# Patient Record
Sex: MALE | Race: WHITE | HISPANIC OR LATINO
[De-identification: names, ages, dates, MRNs, and addresses within clinical notes are randomized per-mention and may not be internally consistent; named-entity substitution may affect disease eponyms.]

---

## 2019-08-28 PROBLEM — Z00.00 ENCOUNTER FOR PREVENTIVE HEALTH EXAMINATION: Status: ACTIVE | Noted: 2019-08-28

## 2019-09-10 ENCOUNTER — APPOINTMENT (OUTPATIENT)
Dept: CARDIOLOGY | Facility: CLINIC | Age: 84
End: 2019-09-10
Payer: MEDICARE

## 2019-09-10 ENCOUNTER — NON-APPOINTMENT (OUTPATIENT)
Age: 84
End: 2019-09-10

## 2019-09-10 VITALS
OXYGEN SATURATION: 100 % | HEIGHT: 66 IN | HEART RATE: 75 BPM | WEIGHT: 154 LBS | DIASTOLIC BLOOD PRESSURE: 64 MMHG | SYSTOLIC BLOOD PRESSURE: 154 MMHG | BODY MASS INDEX: 24.75 KG/M2

## 2019-09-10 DIAGNOSIS — E11.9 TYPE 2 DIABETES MELLITUS W/OUT COMPLICATIONS: ICD-10-CM

## 2019-09-10 PROCEDURE — 93000 ELECTROCARDIOGRAM COMPLETE: CPT

## 2019-09-10 PROCEDURE — 99205 OFFICE O/P NEW HI 60 MIN: CPT

## 2019-09-10 NOTE — PHYSICAL EXAM
[General Appearance - Well Developed] : well developed [Normal Appearance] : normal appearance [Well Groomed] : well groomed [General Appearance - Well Nourished] : well nourished [No Deformities] : no deformities [General Appearance - In No Acute Distress] : no acute distress [Normal Conjunctiva] : the conjunctiva exhibited no abnormalities [Normal Oral Mucosa] : normal oral mucosa [Normal Oropharynx] : normal oropharynx [Normal] : normal [Normal Rate] : normal [Irregularly Irregular] : irregularly irregular [Normal S1] : normal S1 [Normal S2] : normal S2 [II] : a grade 2 [___ +] : bilateral [unfilled]U+ pretibial pitting edema [Respiration, Rhythm And Depth] : normal respiratory rhythm and effort [Exaggerated Use Of Accessory Muscles For Inspiration] : no accessory muscle use [Auscultation Breath Sounds / Voice Sounds] : lungs were clear to auscultation bilaterally [Abdomen Soft] : soft [Abdomen Tenderness] : non-tender [Abnormal Walk] : normal gait [Cyanosis, Localized] : no localized cyanosis [Nail Clubbing] : no clubbing of the fingernails [Affect] : the affect was normal [] : no rash [Skin Color & Pigmentation] : normal skin color and pigmentation [Mood] : the mood was normal

## 2019-09-11 ENCOUNTER — MEDICATION RENEWAL (OUTPATIENT)
Age: 84
End: 2019-09-11

## 2019-09-13 RX ORDER — CLOPIDOGREL BISULFATE 75 MG/1
75 TABLET, FILM COATED ORAL DAILY
Qty: 1 | Refills: 2 | Status: DISCONTINUED | COMMUNITY
Start: 1900-01-01 | End: 2019-09-13

## 2019-10-01 ENCOUNTER — APPOINTMENT (OUTPATIENT)
Dept: CARDIOLOGY | Facility: CLINIC | Age: 84
End: 2019-10-01

## 2019-10-14 ENCOUNTER — APPOINTMENT (OUTPATIENT)
Dept: CV DIAGNOSITCS | Facility: HOSPITAL | Age: 84
End: 2019-10-14

## 2019-10-14 ENCOUNTER — OUTPATIENT (OUTPATIENT)
Dept: OUTPATIENT SERVICES | Facility: HOSPITAL | Age: 84
LOS: 1 days | End: 2019-10-14
Payer: MEDICARE

## 2019-10-14 DIAGNOSIS — I48.91 UNSPECIFIED ATRIAL FIBRILLATION: ICD-10-CM

## 2019-10-14 PROCEDURE — 93306 TTE W/DOPPLER COMPLETE: CPT

## 2019-10-14 PROCEDURE — 93306 TTE W/DOPPLER COMPLETE: CPT | Mod: 26

## 2019-10-14 NOTE — DISCUSSION/SUMMARY
[FreeTextEntry1] : Patient is a 88yo M with a PMHx of HTN, HLD, and DM who comes to clinic for evaluation of newly found atrial fibrillation. \par ---------------\par TTE -  - Normal LV; Mild AS; Mild to Mod MR; Mod PI; Mod TR; RVE with normal function; E/e\par \par ---------------\par \par # AFib. He has been on afib for at least 1 month. \par - Given risk of CVA would recommend AC. Risks and benefit discussed. \par - Echocardiogram ordered to assess for any valvular/structural abnormalities. \par -\par \par #LE Edema\par - Monitor\par - Given c/f fall, will hold off on treating with diuretics. \par \par \par \par \par \par \par \par \par \par  \par \par \par \par \par \par \par \par \par \par \par \par \par Hypertension (Stable; Chronic). Hyperlipidemia (Stable; Chronic). Coronary Artery Disease (Stable; Chronic). Medication plan for these conditions noted above. \par Total Time Spent in face-to-face encounter was 60 minutes. >50% time spent in counseling and coordination of care and on addressing above medical conditions in assessment.\par All labs, imaging, consulting reports, and any relevant outside records including laboratory work personally reviewed in order to evaluate, manage, and coordinate care amongst providers.  Patient-Risk (High).\par \par

## 2019-10-14 NOTE — REASON FOR VISIT
[Initial Evaluation] : an initial evaluation of [Atrial Fibrillation] : atrial fibrillation [Hyperlipidemia] : hyperlipidemia [Hypertension] : hypertension [FreeTextEntry1] : Patient is a 85yo M with a PMHx of HTN, HLD, and DM who comes to clinic for evaluation of newly found atrial fibrillation.

## 2019-10-14 NOTE — HISTORY OF PRESENT ILLNESS
[FreeTextEntry1] : 89yo M - Son Cardiologist in [place] - HTN, HLD, DM - who comes to clinic for evaluation of newly found atrial fibrillation. \par ===============\par ===============\par \par He went to his PMD for his regular checkup when he was noted to be in a-fib and this is the 1st time he was noted to be in this rhythm. \par Patient mentions headaches in the morning for the past 3 months that he wakes up with every day and goes away on its own by noon. Denies any CP, SOB, palpitations, claudication, or LOC.\par He is not aware of snoring and does not wake up gasping for air or feeling fatigued during the day. \par Smokes cigars daily since age 16. \par Used to be a heavy drinker in the past but no longer drinks. \par * Of note, patient is actually going to turn 89yrs old- after he was born he did not get a birth certificate for a couple of years.\par -----------------------------\par \par Discussed getting TTE\par TTE -  - Normal LV; Mild AS; Mild to Mod MR; Mod PI; Mod TR; RVE with normal function; E/e\par Discussed with son-in-law starting Eliquis 5 BID\par Continuing Bystolic. \par Rate control.\par -----------------------------\par \par  Reviewed TTE; Will need to start Lasix.\par Tried calling at 10/14 2:48PM - but said person you are calling cannot accept calls. \par \par \par ====================\par ====================\par Labs on 8/2019 showed\par - HgA1C 6.1%\par - Cr 0.89\par \par Lipids on 3/2019 showed\par - \par - LDL 60\par - HDL 59\par - Tg 52

## 2019-10-22 RX ORDER — FUROSEMIDE 20 MG/1
20 TABLET ORAL DAILY
Qty: 30 | Refills: 5 | Status: ACTIVE | COMMUNITY
Start: 2019-10-22 | End: 1900-01-01

## 2019-12-11 ENCOUNTER — APPOINTMENT (OUTPATIENT)
Dept: CARDIOLOGY | Facility: CLINIC | Age: 84
End: 2019-12-11

## 2020-01-15 ENCOUNTER — APPOINTMENT (OUTPATIENT)
Dept: CARDIOLOGY | Facility: CLINIC | Age: 85
End: 2020-01-15
Payer: MEDICARE

## 2020-01-15 ENCOUNTER — NON-APPOINTMENT (OUTPATIENT)
Age: 85
End: 2020-01-15

## 2020-01-15 VITALS — DIASTOLIC BLOOD PRESSURE: 65 MMHG | OXYGEN SATURATION: 98 % | HEART RATE: 58 BPM | SYSTOLIC BLOOD PRESSURE: 155 MMHG

## 2020-01-15 PROCEDURE — 93000 ELECTROCARDIOGRAM COMPLETE: CPT

## 2020-01-15 PROCEDURE — 99215 OFFICE O/P EST HI 40 MIN: CPT

## 2020-02-04 ENCOUNTER — APPOINTMENT (OUTPATIENT)
Dept: CV DIAGNOSITCS | Facility: HOSPITAL | Age: 85
End: 2020-02-04

## 2020-04-15 ENCOUNTER — APPOINTMENT (OUTPATIENT)
Dept: CARDIOLOGY | Facility: CLINIC | Age: 85
End: 2020-04-15

## 2021-12-22 ENCOUNTER — NON-APPOINTMENT (OUTPATIENT)
Age: 86
End: 2021-12-22

## 2021-12-22 ENCOUNTER — APPOINTMENT (OUTPATIENT)
Dept: GERIATRICS | Facility: CLINIC | Age: 86
End: 2021-12-22
Payer: MEDICARE

## 2021-12-22 ENCOUNTER — LABORATORY RESULT (OUTPATIENT)
Age: 86
End: 2021-12-22

## 2021-12-22 VITALS
DIASTOLIC BLOOD PRESSURE: 54 MMHG | OXYGEN SATURATION: 98 % | BODY MASS INDEX: 25.42 KG/M2 | WEIGHT: 157.5 LBS | TEMPERATURE: 98.9 F | SYSTOLIC BLOOD PRESSURE: 136 MMHG | HEART RATE: 56 BPM

## 2021-12-22 DIAGNOSIS — Z23 ENCOUNTER FOR IMMUNIZATION: ICD-10-CM

## 2021-12-22 PROCEDURE — 99204 OFFICE O/P NEW MOD 45 MIN: CPT

## 2021-12-22 NOTE — PHYSICAL EXAM
[Alert] : alert [Well Nourished] : well nourished [No Acute Distress] : in no acute distress [Well Developed] : well developed [Sclera] : the sclera and conjunctiva were normal [PERRL] : pupils were equal in size, round, and reactive to light [Normal Oral Mucosa] : normal oral mucosa [No Oral Pallor] : no oral pallor [Normal Outer Ear/Nose] : the ears and nose were normal in appearance [Both Tympanic Membranes Were Examined] : both tympanic membranes were normal [Normal Appearance] : the appearance of the neck was normal [Supple] : the neck was supple [No Respiratory Distress] : no respiratory distress [No Acc Muscle Use] : no accessory muscle use [Respiration, Rhythm And Depth] : normal respiratory rhythm and effort [Auscultation Breath Sounds / Voice Sounds] : lungs were clear to auscultation bilaterally [Normal PMI] : the apical impulse was abnormal [Normal S1, S2] : normal S1 and S2 [Heart Rate And Rhythm] : heart rate was normal and rhythm regular [Bowel Sounds] : normal bowel sounds [Abdomen Tenderness] : non-tender [Abdomen Soft] : soft [No CVA Tenderness] : no CVA  tenderness [No Spinal Tenderness] : no spinal tenderness [Normal Gait] : normal gait [No Clubbing, Cyanosis] : no clubbing or cyanosis of the fingernails [No Joint Swelling] : no joint swelling seen [Motor Tone] : muscle strength and tone were normal [Normal Color / Pigmentation] : normal skin color and pigmentation [Normal Turgor] : normal skin turgor [No Focal Deficits] : no focal deficits [Normal Affect] : the affect was normal [Normal Mood] : the mood was normal [Normal Hearing] : hearing was not normal [de-identified] : poor generalized hygiene  [de-identified] : very hard of hearing, poor dentition, multiple teeth missing  [de-identified] : short term memory loss, AO x 1 (person only)

## 2021-12-22 NOTE — HISTORY OF PRESENT ILLNESS
[One fall no injury in past year] : Patient reported one fall in the past year without injury [Independent] : transferring/mobility [Full assistance needed] : Assistance needed managing medications [] : Assistance needed managing finances. [Smoke Detector] : smoke detector [Carbon Monoxide Detector] : carbon monoxide detector [0] : 2) Feeling down, depressed, or hopeless: Not at all (0) [FreeTextEntry1] : Mr. Mott is a 89 yo M coming from home lives with her wife Ольга. Accompanied to current visit by his daughter Maddie. History obtained from pt and assisted by his daughter.  Ambulates without assistance. Has Hx of atrial fibrillation (Eliquis), memory problems, hard of hearing, DM type 2 and HTN.\par \par Comes to the office due to worsening memory problems. Complains of worsening memory problems recently. Daughter reported pt has not taken a shower in the past 6 months. He lives with his wife who may also have memory problems. She reported his mother supervised his medications but is unsure if he is compliant with medications. He is very hard of hearing but refused to have hearing tested. His daughter reported has increase paranoia in regards to his money and finances, he accuses her of stealing her money and refuses to have anyone help at home. \par \par He had a fall in September, daughter denies any major injuries. \par \par He was 1 of 7 siblings and reported they have all passes away. 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 He used to work in maintenance in a building.  \par \par Vaccinations: Completed COVID course. Flu shot 2021. Shingles / PNA vaccine unsure.\par \par Advance directives unsure.\par  [Guns at Home] : no guns at home [Stair Lift] : no stair lift used in home [Grab Bars] : no grab bars [Shower Chair] : no shower chair [Night Light] : no night light [Anti-Slip Measures] : no anti-slip measures [Driving Concerns] : not driving or driving without noted concerns [Wears Seat Belt] : does not wear seat belt [Wears Sunscreen] : does not wear sunscreen

## 2021-12-22 NOTE — REASON FOR VISIT
[Initial Evaluation] : an initial evaluation [Family Member] : family member [FreeTextEntry1] : memory problems

## 2021-12-23 PROBLEM — Z23 ENCOUNTER FOR IMMUNIZATION: Status: ACTIVE | Noted: 2021-12-23

## 2021-12-24 LAB
APPEARANCE: CLEAR
BILIRUBIN URINE: NEGATIVE
BLOOD URINE: NEGATIVE
COLOR: YELLOW
GLUCOSE QUALITATIVE U: NEGATIVE
KETONES URINE: NEGATIVE
LEUKOCYTE ESTERASE URINE: NEGATIVE
NITRITE URINE: NEGATIVE
PH URINE: 6
PROTEIN URINE: ABNORMAL
SPECIFIC GRAVITY URINE: 1.03
UROBILINOGEN URINE: ABNORMAL

## 2022-02-17 ENCOUNTER — APPOINTMENT (OUTPATIENT)
Dept: DERMATOLOGY | Facility: CLINIC | Age: 87
End: 2022-02-17
Payer: MEDICARE

## 2022-02-17 DIAGNOSIS — L30.9 DERMATITIS, UNSPECIFIED: ICD-10-CM

## 2022-02-17 PROCEDURE — 99204 OFFICE O/P NEW MOD 45 MIN: CPT | Mod: GC

## 2022-03-02 ENCOUNTER — OUTPATIENT (OUTPATIENT)
Dept: OUTPATIENT SERVICES | Facility: HOSPITAL | Age: 87
LOS: 1 days | Discharge: HOME | End: 2022-03-02

## 2022-03-02 DIAGNOSIS — D64.9 ANEMIA, UNSPECIFIED: ICD-10-CM

## 2022-03-03 ENCOUNTER — APPOINTMENT (OUTPATIENT)
Dept: HEMATOLOGY ONCOLOGY | Facility: CLINIC | Age: 87
End: 2022-03-03
Payer: MEDICARE

## 2022-03-03 VITALS
OXYGEN SATURATION: 99 % | HEART RATE: 50 BPM | SYSTOLIC BLOOD PRESSURE: 143 MMHG | DIASTOLIC BLOOD PRESSURE: 50 MMHG | WEIGHT: 160.94 LBS | RESPIRATION RATE: 24 BRPM | TEMPERATURE: 99.7 F

## 2022-03-03 DIAGNOSIS — D64.9 ANEMIA, UNSPECIFIED: ICD-10-CM

## 2022-03-03 DIAGNOSIS — R23.4 CHANGES IN SKIN TEXTURE: ICD-10-CM

## 2022-03-03 DIAGNOSIS — Z87.898 PERSONAL HISTORY OF OTHER SPECIFIED CONDITIONS: ICD-10-CM

## 2022-03-03 DIAGNOSIS — F17.290 NICOTINE DEPENDENCE, OTHER TOBACCO PRODUCT, UNCOMPLICATED: ICD-10-CM

## 2022-03-03 DIAGNOSIS — D69.6 THROMBOCYTOPENIA, UNSPECIFIED: ICD-10-CM

## 2022-03-03 PROCEDURE — 99205 OFFICE O/P NEW HI 60 MIN: CPT

## 2022-03-07 NOTE — PHYSICAL EXAM
[Capable of only limited self care, confined to bed or chair more than 50% of waking hours] : Status 3- Capable of only limited self care, confined to bed or chair more than 50% of waking hours [Thin] : thin [Normal] : affect appropriate [Mucositis] : no mucositis [Ulcers] : no ulcers [Thrush] : no thrush [Vesicles] : no vesicles [de-identified] : facial beard [de-identified] : bilateral cataracts [de-identified] : dental extractions noted with bridge work [de-identified] : kyphosis stiffness [de-identified] : clear [de-identified] : decreased muscle mass [de-identified] : regular rhythm [de-identified] : rash over PIP joints [de-identified] : needs to hold on to objects while walking; waljs behid wheel chair [de-identified] : not agitated. calm in family presence. limited gognition.

## 2022-03-07 NOTE — REVIEW OF SYSTEMS
[Vision Problems] : vision problems [Loss of Hearing] : loss of hearing [Palpitations] : palpitations [SOB on Exertion] : shortness of breath during exertion [Incontinence] : incontinence [Skin Rash] : skin rash [Skin Wound] : skin wound [Confused] : confusion [Dizziness] : dizziness [Difficulty Walking] : difficulty walking [Insomnia] : insomnia [Anxiety] : anxiety [Negative] : Allergic/Immunologic [Fever] : no fever [Chills] : no chills [Night Sweats] : no night sweats [Fatigue] : no fatigue [Recent Change In Weight] : ~T no recent weight change [Eye Pain] : no eye pain [Red Eyes] : eyes not red [Dry Eyes] : no dryness of the eyes [Dysphagia] : no dysphagia [Nosebleeds] : no nosebleeds [Hoarseness] : no hoarseness [Odynophagia] : no odynophagia [Mucosal Pain] : no mucosal pain [Chest Pain] : no chest pain [Leg Claudication] : no intermittent leg claudication [Shortness Of Breath] : no shortness of breath [Lower Ext Edema] : no lower extremity edema [Wheezing] : no wheezing [Cough] : no cough [Abdominal Pain] : no abdominal pain [Vomiting] : no vomiting [Constipation] : no constipation [Diarrhea] : no diarrhea [Dysuria] : no dysuria [Joint Pain] : no joint pain [Joint Stiffness] : no joint stiffness [Muscle Pain] : no muscle pain [Fainting] : no fainting [Suicidal] : not suicidal [Proptosis] : no proptosis [Hot Flashes] : no hot flashes [Muscle Weakness] : no muscle weakness [Deepening Of The Voice] : no deepening of the voice [Easy Bleeding] : no tendency for easy bleeding [Easy Bruising] : no tendency for easy bruising [Swollen Glands] : no swollen glands [FreeTextEntry3] : cataract [FreeTextEntry6] : able to walk 150 feet without stopping [FreeTextEntry8] : refuses incontinence [de-identified] : hand rash [de-identified] : "sun downing " at night [de-identified] : fallen

## 2022-03-07 NOTE — REASON FOR VISIT
[Blood Count Assessment] : blood count assessment [Spouse] : spouse [Other: _____] : [unfilled] [FreeTextEntry2] : blood count assessment

## 2022-03-07 NOTE — HISTORY OF PRESENT ILLNESS
[Disease:__________________________] : Disease: [unfilled] [Date: ____________] : Patient's last distress assessment performed on [unfilled]. [0 - No Distress] : Distress Level: 0 [de-identified] : Dave Mott is 89 year old male with a history of anemia accompanied by wife (wife in wheelchair); patient and wife have cognitive impairment both do not speak English. Patient and wife have dementia and patient has difficulty in hearing. 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 Patient true age is 3 years greater 89; he has been refusing daughter Maddie efforts to bring in home attendant. There have been near falls at home. He is taking required anticoagulant. No bleeding observed . he is under treatment for conditions noted above. there is mild weight loss and refusal to eat. NO fever no hospitalzation. no blood transfusion

## 2022-03-07 NOTE — ASSESSMENT
[Supportive] : Goals of care discussed with patient: Supportive [Palliative Care Plan] : not applicable at this time [FreeTextEntry1] : Jeramie Mott is a 89 year old male with multiple medical problems : dementia, diabetes type 2, hypertension, atrial fibrillation, gait disturbance, debility and refusal to accept home attendant. I am asked to evaluate a Decrease in HGB from 11.2 in 2020 to current levels of 9.4 g/dL  normal ferritin normal iron level. \par He had one reading of a platelet count of 98 000 in December 2021 however platelets were noted to be clumped and subsequent platelet counts have been near normal 148 000 (normal 150). In that instance the patient has spumous thrombocytopenia and he is not requiring treatment . This is an artifact of CBC [place] counter; it is induced by EDTA and absent  calcium  levels (plasma) .\par White cell counts have been normal and the white cell differential is normal. \par I spoke with son in law Zeinab Frost MD cardiology [facility] by cell phone during the visit\par The cause of the anemia is not clear ; no iron deficiency ; there is minimal MCV elevation which is seen in a greater proportion of elderly patients. He should receive multivitamins daily.\par He is taking apixiban for AF. No bleeding is noted. GI evaluation with endoscopy may have limited use in his state of dementia and he has no history of bleeding and he has normal ferritin and iron levels.\par HGB 9.4 is not requiring transfusion and he may be observed. Oral iron is not necessary.\par I discussed other diagnosis including low grade myelodysplasia. No dysplastic cells or blasts are noted on provided blood tests. Blood testing discussed for today but decision (in conjunction with family) was to not perform more testing. A bone marrow aspiration and biopsy would be difficult with his poor medical condition and dementia. If MDS were established he is not a candidate for chemotherapy. According to daughter listed age of 89  years is not accurate; true age is 89.\par Patient may follow with geriatric service and may be seen PRN if transfusion is required as discussed with the family

## 2022-03-07 NOTE — CONSULT LETTER
[Dear  ___] : Dear  [unfilled], [Consult Letter:] : I had the pleasure of evaluating your patient, [unfilled]. [Please see my note below.] : Please see my note below. [Consult Closing:] : Thank you very much for allowing me to participate in the care of this patient.  If you have any questions, please do not hesitate to contact me. [Sincerely,] : Sincerely, [FreeTextEntry2] : Lorna Rodriguez MD\par [address]\par [address]\par [facility] [id number] [FreeTextEntry3] : Fran Haider MD

## 2022-03-14 ENCOUNTER — APPOINTMENT (OUTPATIENT)
Dept: GERIATRICS | Facility: CLINIC | Age: 87
End: 2022-03-14
Payer: MEDICARE

## 2022-03-14 VITALS
BODY MASS INDEX: 25.41 KG/M2 | SYSTOLIC BLOOD PRESSURE: 120 MMHG | WEIGHT: 158.13 LBS | DIASTOLIC BLOOD PRESSURE: 40 MMHG | HEART RATE: 57 BPM | HEIGHT: 66 IN | TEMPERATURE: 97.9 F | RESPIRATION RATE: 15 BRPM | OXYGEN SATURATION: 99 %

## 2022-03-14 DIAGNOSIS — Z63.6 DEPENDENT RELATIVE NEEDING CARE AT HOME: ICD-10-CM

## 2022-03-14 DIAGNOSIS — R46.0 VERY LOW LVL OF PERSONAL HYGIENE: ICD-10-CM

## 2022-03-14 PROCEDURE — 99214 OFFICE O/P EST MOD 30 MIN: CPT

## 2022-03-14 SDOH — SOCIAL STABILITY - SOCIAL INSECURITY: DEPENDENT RELATIVE NEEDING CARE AT HOME: Z63.6

## 2022-03-14 NOTE — REASON FOR VISIT
[Follow-Up] : a follow-up visit [Family Member] : family member [FreeTextEntry1] : follow up visit due to memory problems

## 2022-03-14 NOTE — ASSESSMENT
[FreeTextEntry1] : - f/u in 2 months\par - will attempt to repeat blood work on next visit\par - will also try to do family meeting

## 2022-03-14 NOTE — HISTORY OF PRESENT ILLNESS
[One fall no injury in past year] : Patient reported one fall in the past year without injury [Independent] : transferring/mobility [Full assistance needed] : Assistance needed managing medications [] : Assistance needed managing finances. [Smoke Detector] : smoke detector [Carbon Monoxide Detector] : carbon monoxide detector [0] : 2) Feeling down, depressed, or hopeless: Not at all (0) [FreeTextEntry1] : Mr. Mott is a 88 yo M coming from home lives with her wife Ольга. Accompanied to current visit by his daughter Maddie. History obtained from pt and assisted by his daughter.  Ambulates without assistance, but was a walker. Has Hx of atrial fibrillation (Eliquis), memory problems, hard of hearing, DM type 2 and HTN.\par \par Comes to the office due to worsening memory problems. Since last visit daughter reported pts memory has gotten worse, he continues to be very repetitive and confused. His daughter also noticed episodes of urine and stool incontinence, that he didn’t recall.\par \par He continues to rely on his wife who also has memory problems to give him medications. Daughter is thinking about installing cameras as pt reported has 3 unwitnessed falls since last visit, pt complains of pain in his left knee at times and his daughter also noticed a back bruise on exam. They believe pt fell backwards and that’s how he hurt himself.  \par \par His daughter is still very overwhelmed about taking care of both of her parents who have memory problems and not being supported by her brother. She is in the process of hiring a HHA for a couple of hours a week to help with care at home, she is also in the process of applying for Medicaid and assisting with financial care. She reported her dad wrote a check incorrectly and feels worried he cannot longer manage his finances. Daughter also reported both pt are hoarding in the house. \par \par Since last visit pt was seen by hematologist for anemia and thrombocytopenia, was recommended conservative treatment. \par \par He was 1 of 7 siblings and reported they have all passed away. 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 He used to work in maintenance in a building.  \par \par Vaccinations: Completed COVID course. Flu shot 2021. Shingles / PNA vaccine unsure.\par \par Advance directives unsure.\par  [Guns at Home] : no guns at home [Stair Lift] : no stair lift used in home [Grab Bars] : no grab bars [Shower Chair] : no shower chair [Night Light] : no night light [Anti-Slip Measures] : no anti-slip measures [Driving Concerns] : not driving or driving without noted concerns [Wears Seat Belt] : does not wear seat belt [Wears Sunscreen] : does not wear sunscreen

## 2022-03-14 NOTE — PHYSICAL EXAM
[Alert] : alert [Well Nourished] : well nourished [Well Developed] : well developed [Sclera] : the sclera and conjunctiva were normal [PERRL] : pupils were equal in size, round, and reactive to light [Normal Oral Mucosa] : normal oral mucosa [No Oral Pallor] : no oral pallor [Normal Outer Ear/Nose] : the ears and nose were normal in appearance [Both Tympanic Membranes Were Examined] : both tympanic membranes were normal [Normal Appearance] : the appearance of the neck was normal [Supple] : the neck was supple [No Respiratory Distress] : no respiratory distress [No Acc Muscle Use] : no accessory muscle use [Respiration, Rhythm And Depth] : normal respiratory rhythm and effort [Auscultation Breath Sounds / Voice Sounds] : lungs were clear to auscultation bilaterally [Normal PMI] : the apical impulse was abnormal [Normal S1, S2] : normal S1 and S2 [Heart Rate And Rhythm] : heart rate was normal and rhythm regular [Bowel Sounds] : normal bowel sounds [Abdomen Tenderness] : non-tender [Abdomen Soft] : soft [No CVA Tenderness] : no CVA  tenderness [No Spinal Tenderness] : no spinal tenderness [Normal Gait] : normal gait [No Clubbing, Cyanosis] : no clubbing or cyanosis of the fingernails [No Joint Swelling] : no joint swelling seen [Motor Tone] : muscle strength and tone were normal [Normal Color / Pigmentation] : normal skin color and pigmentation [Normal Turgor] : normal skin turgor [No Focal Deficits] : no focal deficits [Normal Affect] : the affect was normal [Normal Mood] : the mood was normal [Normal Hearing] : hearing was not normal [de-identified] : poor generalized hygiene  [de-identified] : very hard of hearing, poor dentition, multiple teeth missing  [de-identified] : short term memory loss, AO x 1 (person only)

## 2022-03-15 RX ORDER — DONEPEZIL HYDROCHLORIDE 5 MG/1
5 TABLET ORAL
Refills: 0 | Status: ACTIVE | COMMUNITY

## 2022-03-31 ENCOUNTER — APPOINTMENT (OUTPATIENT)
Dept: DERMATOLOGY | Facility: CLINIC | Age: 87
End: 2022-03-31
Payer: MEDICARE

## 2022-03-31 DIAGNOSIS — L24.9 IRRITANT CONTACT DERMATITIS, UNSPECIFIED CAUSE: ICD-10-CM

## 2022-03-31 PROCEDURE — 99213 OFFICE O/P EST LOW 20 MIN: CPT

## 2022-03-31 RX ORDER — HALOBETASOL PROPIONATE 0.5 MG/G
0.05 OINTMENT TOPICAL
Qty: 1 | Refills: 3 | Status: ACTIVE | COMMUNITY
Start: 2022-02-17 | End: 1900-01-01

## 2022-04-01 ENCOUNTER — APPOINTMENT (OUTPATIENT)
Dept: GERIATRICS | Facility: CLINIC | Age: 87
End: 2022-04-01
Payer: MEDICARE

## 2022-04-01 PROCEDURE — 99497 ADVNCD CARE PLAN 30 MIN: CPT

## 2022-04-18 RX ORDER — GLIPIZIDE 10 MG/1
10 TABLET, EXTENDED RELEASE ORAL DAILY
Qty: 30 | Refills: 3 | Status: ACTIVE | COMMUNITY
Start: 1900-01-01 | End: 1900-01-01

## 2022-04-19 LAB
25(OH)D3 SERPL-MCNC: 33.5 NG/ML
ALBUMIN SERPL ELPH-MCNC: 4.2 G/DL
ALP BLD-CCNC: 126 U/L
ALT SERPL-CCNC: 12 U/L
ANION GAP SERPL CALC-SCNC: 11 MMOL/L
AST SERPL-CCNC: 23 U/L
BASOPHILS # BLD AUTO: 0.04 K/UL
BASOPHILS NFR BLD AUTO: 1 %
BILIRUB SERPL-MCNC: 0.4 MG/DL
BUN SERPL-MCNC: 27 MG/DL
CALCIUM SERPL-MCNC: 9.6 MG/DL
CHLORIDE SERPL-SCNC: 98 MMOL/L
CO2 SERPL-SCNC: 29 MMOL/L
CREAT SERPL-MCNC: 1.09 MG/DL
EGFR: 65 ML/MIN/1.73M2
EOSINOPHIL # BLD AUTO: 0.1 K/UL
EOSINOPHIL NFR BLD AUTO: 2.5 %
ESTIMATED AVERAGE GLUCOSE: 120 MG/DL
FOLATE SERPL-MCNC: >20 NG/ML
GLUCOSE SERPL-MCNC: 173 MG/DL
HBA1C MFR BLD HPLC: 5.8 %
HCT VFR BLD CALC: 35 %
HGB BLD-MCNC: 10.8 G/DL
IMM GRANULOCYTES NFR BLD AUTO: 0.8 %
LYMPHOCYTES # BLD AUTO: 1.12 K/UL
LYMPHOCYTES NFR BLD AUTO: 28.1 %
MAN DIFF?: NORMAL
MCHC RBC-ENTMCNC: 30.9 GM/DL
MCHC RBC-ENTMCNC: 31.5 PG
MCV RBC AUTO: 102 FL
MONOCYTES # BLD AUTO: 0.52 K/UL
MONOCYTES NFR BLD AUTO: 13 %
NEUTROPHILS # BLD AUTO: 2.18 K/UL
NEUTROPHILS NFR BLD AUTO: 54.6 %
PLATELET # BLD AUTO: 132 K/UL
POTASSIUM SERPL-SCNC: 5.4 MMOL/L
PROT SERPL-MCNC: 7 G/DL
RBC # BLD: 3.43 M/UL
RBC # FLD: 13.3 %
SODIUM SERPL-SCNC: 139 MMOL/L
TSH SERPL-ACNC: 3.77 UIU/ML
VIT B12 SERPL-MCNC: 511 PG/ML
WBC # FLD AUTO: 3.99 K/UL

## 2022-05-06 RX ORDER — NEBIVOLOL 5 MG/1
5 TABLET ORAL DAILY
Qty: 90 | Refills: 2 | Status: ACTIVE | COMMUNITY
Start: 1900-01-01 | End: 1900-01-01

## 2022-05-06 RX ORDER — LOSARTAN POTASSIUM 100 MG/1
100 TABLET, FILM COATED ORAL
Qty: 90 | Refills: 2 | Status: ACTIVE | COMMUNITY
Start: 1900-01-01 | End: 1900-01-01

## 2022-05-06 RX ORDER — APIXABAN 5 MG/1
5 TABLET, FILM COATED ORAL
Qty: 90 | Refills: 3 | Status: ACTIVE | COMMUNITY
Start: 2019-09-13 | End: 1900-01-01

## 2022-05-16 ENCOUNTER — APPOINTMENT (OUTPATIENT)
Dept: GERIATRICS | Facility: CLINIC | Age: 87
End: 2022-05-16
Payer: MEDICARE

## 2022-05-16 VITALS
RESPIRATION RATE: 16 BRPM | OXYGEN SATURATION: 97 % | SYSTOLIC BLOOD PRESSURE: 138 MMHG | DIASTOLIC BLOOD PRESSURE: 40 MMHG | BODY MASS INDEX: 25.45 KG/M2 | TEMPERATURE: 98.5 F | WEIGHT: 158.38 LBS | HEIGHT: 66 IN | HEART RATE: 81 BPM

## 2022-05-16 DIAGNOSIS — I10 ESSENTIAL (PRIMARY) HYPERTENSION: ICD-10-CM

## 2022-05-16 DIAGNOSIS — R41.3 OTHER AMNESIA: ICD-10-CM

## 2022-05-16 DIAGNOSIS — I48.91 UNSPECIFIED ATRIAL FIBRILLATION: ICD-10-CM

## 2022-05-16 DIAGNOSIS — E78.5 HYPERLIPIDEMIA, UNSPECIFIED: ICD-10-CM

## 2022-05-16 DIAGNOSIS — R26.81 UNSTEADINESS ON FEET: ICD-10-CM

## 2022-05-16 DIAGNOSIS — E11.69 TYPE 2 DIABETES MELLITUS WITH OTHER SPECIFIED COMPLICATION: ICD-10-CM

## 2022-05-16 DIAGNOSIS — E87.5 HYPERKALEMIA: ICD-10-CM

## 2022-05-16 PROCEDURE — 99214 OFFICE O/P EST MOD 30 MIN: CPT

## 2022-05-16 NOTE — HISTORY OF PRESENT ILLNESS
[One fall no injury in past year] : Patient reported one fall in the past year without injury [Independent] : transferring/mobility [Full assistance needed] : Assistance needed managing medications [] : Assistance needed managing finances. [Smoke Detector] : smoke detector [Carbon Monoxide Detector] : carbon monoxide detector [0] : 2) Feeling down, depressed, or hopeless: Not at all (0) [FreeTextEntry1] : Mr. Mott is a 89 yo M coming from home lives with her wife Ольга. Accompanied to current visit by his daughter Maddie. History obtained from pt and assisted by his daughter.  Ambulates without assistance, but was a walker. Has Hx of atrial fibrillation (Eliquis), memory problems, hard of hearing, DM type 2 and HTN.\par \par Comes to the office due to worsening memory problems. Since last visit the have a new HHA for 6hrs and 3 days, daughter worries about pt being "too aggressive with HHA as he tried to kiss her on one occasion". \par \par He continues to rely on his wife who also has memory problems to give him medications. Daughter is thinking about installing cameras as pt reported has 3 unwitnessed falls since last visit, pt complains of pain in his left knee at times. \par \par His son continues to assist with setting up a weekly pill box, helps shop for meals and will try to install cameras this week. \par \par Since last visit pt was seen by hematologist for anemia and thrombocytopenia, was recommended conservative treatment. \par \par He was 1 of 7 siblings and reported they have all passed away. 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 He used to work in maintenance in a building.  \par \par Vaccinations: Completed COVID course. Flu shot 2021. Shingles / PNA vaccine unsure.\par \par Advance directives unsure.\par  [Guns at Home] : no guns at home [Stair Lift] : no stair lift used in home [Grab Bars] : no grab bars [Shower Chair] : no shower chair [Night Light] : no night light [Anti-Slip Measures] : no anti-slip measures [Driving Concerns] : not driving or driving without noted concerns [Wears Seat Belt] : does not wear seat belt [Wears Sunscreen] : does not wear sunscreen

## 2022-05-16 NOTE — PHYSICAL EXAM
[Alert] : alert [Well Nourished] : well nourished [Well Developed] : well developed [Sclera] : the sclera and conjunctiva were normal [PERRL] : pupils were equal in size, round, and reactive to light [Normal Oral Mucosa] : normal oral mucosa [No Oral Pallor] : no oral pallor [Normal Outer Ear/Nose] : the ears and nose were normal in appearance [Both Tympanic Membranes Were Examined] : both tympanic membranes were normal [Normal Appearance] : the appearance of the neck was normal [Supple] : the neck was supple [No Respiratory Distress] : no respiratory distress [No Acc Muscle Use] : no accessory muscle use [Respiration, Rhythm And Depth] : normal respiratory rhythm and effort [Auscultation Breath Sounds / Voice Sounds] : lungs were clear to auscultation bilaterally [Normal PMI] : the apical impulse was abnormal [Normal S1, S2] : normal S1 and S2 [Heart Rate And Rhythm] : heart rate was normal and rhythm regular [Bowel Sounds] : normal bowel sounds [Abdomen Tenderness] : non-tender [Abdomen Soft] : soft [No CVA Tenderness] : no CVA  tenderness [No Spinal Tenderness] : no spinal tenderness [Normal Gait] : normal gait [No Clubbing, Cyanosis] : no clubbing or cyanosis of the fingernails [No Joint Swelling] : no joint swelling seen [Motor Tone] : muscle strength and tone were normal [Normal Color / Pigmentation] : normal skin color and pigmentation [Normal Turgor] : normal skin turgor [No Focal Deficits] : no focal deficits [Normal Affect] : the affect was normal [Normal Mood] : the mood was normal [Normal Hearing] : hearing was not normal [de-identified] : poor generalized hygiene  [de-identified] : very hard of hearing, poor dentition, multiple teeth missing  [de-identified] : short term memory loss, AO x 1 (person only)

## 2022-05-17 LAB
ALBUMIN SERPL ELPH-MCNC: 4 G/DL
ALP BLD-CCNC: 109 U/L
ALT SERPL-CCNC: 12 U/L
ANION GAP SERPL CALC-SCNC: 9 MMOL/L
AST SERPL-CCNC: 21 U/L
BILIRUB SERPL-MCNC: 0.3 MG/DL
BUN SERPL-MCNC: 22 MG/DL
CALCIUM SERPL-MCNC: 8.8 MG/DL
CHLORIDE SERPL-SCNC: 104 MMOL/L
CO2 SERPL-SCNC: 29 MMOL/L
CREAT SERPL-MCNC: 0.92 MG/DL
EGFR: 80 ML/MIN/1.73M2
GLUCOSE SERPL-MCNC: 174 MG/DL
POTASSIUM SERPL-SCNC: 4.3 MMOL/L
PROT SERPL-MCNC: 6.1 G/DL
SODIUM SERPL-SCNC: 142 MMOL/L

## 2022-06-09 RX ORDER — SIMVASTATIN 20 MG/1
20 TABLET, FILM COATED ORAL
Qty: 90 | Refills: 1 | Status: ACTIVE | COMMUNITY
Start: 1900-01-01 | End: 1900-01-01

## 2022-07-10 ENCOUNTER — INPATIENT (INPATIENT)
Facility: HOSPITAL | Age: 87
LOS: 28 days | Discharge: NURSING, REHAB OR LONG-TERM CARE | End: 2022-08-08
Attending: HOSPITALIST | Admitting: HOSPITALIST

## 2022-07-10 VITALS
TEMPERATURE: 99 F | OXYGEN SATURATION: 99 % | DIASTOLIC BLOOD PRESSURE: 67 MMHG | RESPIRATION RATE: 16 BRPM | SYSTOLIC BLOOD PRESSURE: 155 MMHG | HEART RATE: 92 BPM

## 2022-07-10 LAB
ALBUMIN SERPL ELPH-MCNC: 4.4 G/DL — SIGNIFICANT CHANGE UP (ref 3.3–5)
ALP SERPL-CCNC: 106 U/L — SIGNIFICANT CHANGE UP (ref 40–120)
ALT FLD-CCNC: 19 U/L — SIGNIFICANT CHANGE UP (ref 4–41)
ANION GAP SERPL CALC-SCNC: 16 MMOL/L — HIGH (ref 7–14)
APTT BLD: 29.3 SEC — SIGNIFICANT CHANGE UP (ref 27–36.3)
AST SERPL-CCNC: 39 U/L — SIGNIFICANT CHANGE UP (ref 4–40)
B PERT DNA SPEC QL NAA+PROBE: SIGNIFICANT CHANGE UP
B PERT+PARAPERT DNA PNL SPEC NAA+PROBE: SIGNIFICANT CHANGE UP
BASE EXCESS BLDV CALC-SCNC: -2 MMOL/L — SIGNIFICANT CHANGE UP (ref -2–3)
BASOPHILS # BLD AUTO: 0.02 K/UL — SIGNIFICANT CHANGE UP (ref 0–0.2)
BASOPHILS NFR BLD AUTO: 0.4 % — SIGNIFICANT CHANGE UP (ref 0–2)
BILIRUB SERPL-MCNC: 0.8 MG/DL — SIGNIFICANT CHANGE UP (ref 0.2–1.2)
BLOOD GAS VENOUS COMPREHENSIVE RESULT: SIGNIFICANT CHANGE UP
BORDETELLA PARAPERTUSSIS (RAPRVP): SIGNIFICANT CHANGE UP
BUN SERPL-MCNC: 27 MG/DL — HIGH (ref 7–23)
C PNEUM DNA SPEC QL NAA+PROBE: SIGNIFICANT CHANGE UP
CALCIUM SERPL-MCNC: 9.4 MG/DL — SIGNIFICANT CHANGE UP (ref 8.4–10.5)
CHLORIDE BLDV-SCNC: 101 MMOL/L — SIGNIFICANT CHANGE UP (ref 96–108)
CHLORIDE SERPL-SCNC: 98 MMOL/L — SIGNIFICANT CHANGE UP (ref 98–107)
CO2 BLDV-SCNC: 26.2 MMOL/L — HIGH (ref 22–26)
CO2 SERPL-SCNC: 21 MMOL/L — LOW (ref 22–31)
CREAT SERPL-MCNC: 1.01 MG/DL — SIGNIFICANT CHANGE UP (ref 0.5–1.3)
EGFR: 71 ML/MIN/1.73M2 — SIGNIFICANT CHANGE UP
EOSINOPHIL # BLD AUTO: 0.12 K/UL — SIGNIFICANT CHANGE UP (ref 0–0.5)
EOSINOPHIL NFR BLD AUTO: 2.2 % — SIGNIFICANT CHANGE UP (ref 0–6)
FLUAV SUBTYP SPEC NAA+PROBE: SIGNIFICANT CHANGE UP
FLUBV RNA SPEC QL NAA+PROBE: SIGNIFICANT CHANGE UP
GAS PNL BLDV: 134 MMOL/L — LOW (ref 136–145)
GLUCOSE BLDV-MCNC: 105 MG/DL — HIGH (ref 70–99)
GLUCOSE SERPL-MCNC: 97 MG/DL — SIGNIFICANT CHANGE UP (ref 70–99)
HADV DNA SPEC QL NAA+PROBE: SIGNIFICANT CHANGE UP
HCO3 BLDV-SCNC: 25 MMOL/L — SIGNIFICANT CHANGE UP (ref 22–29)
HCOV 229E RNA SPEC QL NAA+PROBE: SIGNIFICANT CHANGE UP
HCOV HKU1 RNA SPEC QL NAA+PROBE: SIGNIFICANT CHANGE UP
HCOV NL63 RNA SPEC QL NAA+PROBE: SIGNIFICANT CHANGE UP
HCOV OC43 RNA SPEC QL NAA+PROBE: SIGNIFICANT CHANGE UP
HCT VFR BLD CALC: 37.5 % — LOW (ref 39–50)
HCT VFR BLDA CALC: 37 % — LOW (ref 39–51)
HGB BLD CALC-MCNC: 12.4 G/DL — LOW (ref 13–17)
HGB BLD-MCNC: 12 G/DL — LOW (ref 13–17)
HMPV RNA SPEC QL NAA+PROBE: SIGNIFICANT CHANGE UP
HPIV1 RNA SPEC QL NAA+PROBE: SIGNIFICANT CHANGE UP
HPIV2 RNA SPEC QL NAA+PROBE: SIGNIFICANT CHANGE UP
HPIV3 RNA SPEC QL NAA+PROBE: SIGNIFICANT CHANGE UP
HPIV4 RNA SPEC QL NAA+PROBE: SIGNIFICANT CHANGE UP
IANC: 3.22 K/UL — SIGNIFICANT CHANGE UP (ref 1.8–7.4)
IMM GRANULOCYTES NFR BLD AUTO: 0.7 % — SIGNIFICANT CHANGE UP (ref 0–1.5)
INR BLD: 1.23 RATIO — HIGH (ref 0.88–1.16)
LACTATE BLDV-MCNC: 1.7 MMOL/L — SIGNIFICANT CHANGE UP (ref 0.5–2)
LYMPHOCYTES # BLD AUTO: 1.15 K/UL — SIGNIFICANT CHANGE UP (ref 1–3.3)
LYMPHOCYTES # BLD AUTO: 21.3 % — SIGNIFICANT CHANGE UP (ref 13–44)
M PNEUMO DNA SPEC QL NAA+PROBE: SIGNIFICANT CHANGE UP
MCHC RBC-ENTMCNC: 32 GM/DL — SIGNIFICANT CHANGE UP (ref 32–36)
MCHC RBC-ENTMCNC: 32.5 PG — SIGNIFICANT CHANGE UP (ref 27–34)
MCV RBC AUTO: 101.6 FL — HIGH (ref 80–100)
MONOCYTES # BLD AUTO: 0.85 K/UL — SIGNIFICANT CHANGE UP (ref 0–0.9)
MONOCYTES NFR BLD AUTO: 15.7 % — HIGH (ref 2–14)
NEUTROPHILS # BLD AUTO: 3.22 K/UL — SIGNIFICANT CHANGE UP (ref 1.8–7.4)
NEUTROPHILS NFR BLD AUTO: 59.7 % — SIGNIFICANT CHANGE UP (ref 43–77)
NRBC # BLD: 0 /100 WBCS — SIGNIFICANT CHANGE UP
NRBC # FLD: 0 K/UL — SIGNIFICANT CHANGE UP
NT-PROBNP SERPL-SCNC: 1534 PG/ML — HIGH
PCO2 BLDV: 49 MMHG — SIGNIFICANT CHANGE UP (ref 42–55)
PH BLDV: 7.31 — LOW (ref 7.32–7.43)
PLATELET # BLD AUTO: 106 K/UL — LOW (ref 150–400)
PO2 BLDV: 26 MMHG — SIGNIFICANT CHANGE UP
POTASSIUM BLDV-SCNC: 4.3 MMOL/L — SIGNIFICANT CHANGE UP (ref 3.5–5.1)
POTASSIUM SERPL-MCNC: 4.2 MMOL/L — SIGNIFICANT CHANGE UP (ref 3.5–5.3)
POTASSIUM SERPL-SCNC: 4.2 MMOL/L — SIGNIFICANT CHANGE UP (ref 3.5–5.3)
PROT SERPL-MCNC: 8 G/DL — SIGNIFICANT CHANGE UP (ref 6–8.3)
PROTHROM AB SERPL-ACNC: 14.3 SEC — HIGH (ref 10.5–13.4)
RAPID RVP RESULT: DETECTED
RBC # BLD: 3.69 M/UL — LOW (ref 4.2–5.8)
RBC # FLD: 14.1 % — SIGNIFICANT CHANGE UP (ref 10.3–14.5)
RSV RNA SPEC QL NAA+PROBE: SIGNIFICANT CHANGE UP
RV+EV RNA SPEC QL NAA+PROBE: SIGNIFICANT CHANGE UP
SAO2 % BLDV: 29.6 % — SIGNIFICANT CHANGE UP
SARS-COV-2 RNA SPEC QL NAA+PROBE: DETECTED
SODIUM SERPL-SCNC: 135 MMOL/L — SIGNIFICANT CHANGE UP (ref 135–145)
TROPONIN T, HIGH SENSITIVITY RESULT: 49 NG/L — SIGNIFICANT CHANGE UP
WBC # BLD: 5.4 K/UL — SIGNIFICANT CHANGE UP (ref 3.8–10.5)
WBC # FLD AUTO: 5.4 K/UL — SIGNIFICANT CHANGE UP (ref 3.8–10.5)

## 2022-07-10 PROCEDURE — 99285 EMERGENCY DEPT VISIT HI MDM: CPT | Mod: 25

## 2022-07-10 PROCEDURE — 71045 X-RAY EXAM CHEST 1 VIEW: CPT | Mod: 26

## 2022-07-10 PROCEDURE — 93010 ELECTROCARDIOGRAM REPORT: CPT

## 2022-07-10 RX ORDER — IPRATROPIUM/ALBUTEROL SULFATE 18-103MCG
3 AEROSOL WITH ADAPTER (GRAM) INHALATION ONCE
Refills: 0 | Status: COMPLETED | OUTPATIENT
Start: 2022-07-10 | End: 2022-07-10

## 2022-07-10 RX ORDER — ACETAMINOPHEN 500 MG
1000 TABLET ORAL ONCE
Refills: 0 | Status: COMPLETED | OUTPATIENT
Start: 2022-07-10 | End: 2022-07-10

## 2022-07-10 RX ORDER — DEXAMETHASONE 0.5 MG/5ML
10 ELIXIR ORAL ONCE
Refills: 0 | Status: COMPLETED | OUTPATIENT
Start: 2022-07-10 | End: 2022-07-10

## 2022-07-10 RX ORDER — SODIUM CHLORIDE 9 MG/ML
500 INJECTION, SOLUTION INTRAVENOUS ONCE
Refills: 0 | Status: COMPLETED | OUTPATIENT
Start: 2022-07-10 | End: 2022-07-10

## 2022-07-10 RX ADMIN — Medication 3 MILLILITER(S): at 22:12

## 2022-07-10 RX ADMIN — Medication 102 MILLIGRAM(S): at 22:26

## 2022-07-10 RX ADMIN — Medication 3 MILLILITER(S): at 22:04

## 2022-07-10 RX ADMIN — Medication 400 MILLIGRAM(S): at 22:12

## 2022-07-10 RX ADMIN — SODIUM CHLORIDE 500 MILLILITER(S): 9 INJECTION, SOLUTION INTRAVENOUS at 23:13

## 2022-07-10 NOTE — ED ADULT NURSE NOTE - NSIMPLEMENTINTERV_GEN_ALL_ED
Implemented All Fall with Harm Risk Interventions:  [facility] to call system. Call bell, personal items and telephone within reach. Instruct patient to call for assistance. Room bathroom lighting operational. Non-slip footwear when patient is off stretcher. Physically safe environment: no spills, clutter or unnecessary equipment. Stretcher in lowest position, wheels locked, appropriate side rails in place. Provide visual cue, wrist band, yellow gown, etc. Monitor gait and stability. Monitor for mental status changes and reorient to person, place, and time. Review medications for side effects contributing to fall risk. Reinforce activity limits and safety measures with patient and family. Provide visual clues: red socks.

## 2022-07-10 NOTE — ED PROVIDER NOTE - CARDIAC, MLM
Detail Level: Detailed Detail Level: Generalized Detail Level: Zone Detail Level: Simple Normal rate, regular rhythm.  Heart sounds S1, S2.  No murmurs, rubs or gallops.

## 2022-07-10 NOTE — ED PROVIDER NOTE - OBJECTIVE STATEMENT
Collateral history obtained by patient's daughter ______ at bedside.    Patient is a 89 year old male Collateral history obtained by patient's daughter at bedside.    Patient is a 89 year old male baseline dementia and poor historian, PMH of afib on eliquis and DM BIBEMS at the request of his daughter with worsening cognitive decline and "sundowning", cold-like symptoms after known COVID-19 exposure. Daughter reports that her father and mother live at home together both under care of home health aid who tested positive with symptomatic COVID 3 days ago. In the last 24 hours she has noticed worsening cognitive function and mental status of her father, increasingly more fatigued. She endorses cough and sneezing by observation. Wife was also transported to [facility] ED at same time COVID-19 positive and symptomatic.     Patient PCP is Dr. Lorna Paz of [facility]. Reliant on 24/7 home health aid who is currently sick with COVID-19; no available current home care.

## 2022-07-10 NOTE — ED ADULT NURSE NOTE - CHIEF COMPLAINT QUOTE
Pt arrives via ambulance from home for increased lethargy and weakness beginning yesterday. Pt primarily Colombian speaking, hx of dementia. As per EMS, pt had recent covid exposure. PMHx: afib, HTN, on eliquis

## 2022-07-10 NOTE — ED PROVIDER NOTE - CARE PLAN
1 Principal Discharge DX:	AMS (altered mental status)  Secondary Diagnosis:	2019 novel coronavirus disease (COVID-19)

## 2022-07-10 NOTE — ED PROVIDER NOTE - ATTENDING CONTRIBUTION TO CARE
89M h/o dementia, afib on eliquis and DM p/w worsening with worsening cognitive decline x3days. Pt unable to provide history despite Albanian interpretation, history obtained from daughter. States has been experiencing worsening  "sundowning" and URI symptoms after known COVID-19 exposure. Both he and wife with symptoms after HHA tested positive for Covid. Pt's wife was also transported to [facility] ED at same time COVID-19 positive and symptomatic. As pt's HHA is unable to come in due to Covid infection, daughter states she cannot care for him at home until her return. ON exa, pt non-toxic appearing. AO to self. Follwos commands in Macedonian. MMM, no pharyngeal erythema. RRR. +wheezes at bases. Abd soft,ntnd. NO edema. No rashes. Given reported delirium and URI symptoms, likely metabolic encephalopathy in setting Covid infection. Will get infectious workup including UA/UC, CXR, RVP with Covid. Currently afebrile, no blood cultures required. Check cbc, cmp, vbg with lactate. Currently calm and cooperative.

## 2022-07-10 NOTE — ED ADULT TRIAGE NOTE - CHIEF COMPLAINT QUOTE
Pt arrives via ambulance from home for increased lethargy and weakness beginning yesterday. Pt primarily Equ[place]inean speaking, hx of dementia. As per EMS, pt had recent covid exposure. PMHx: afib, HTN, on eliquis

## 2022-07-10 NOTE — ED ADULT NURSE NOTE - OBJECTIVE STATEMENT
88 y/o male, a&ox2, received to rm 23 with c/o weakness. Pt is Nicaraguan speaking and poor historian, daughter is at bedside for history, claims pt's home aide and wife tested positive for covid. PMH of a fib, dementia , DM, and hard of hearing. Daughter claims pt is ambulatory at baseline. Pt presents tachypneic and agitated, however re-directable. 20G IV placed to right forearm, labs collected and sent off, pt medicated as per MD orders. Respirations are now even and unlabored, no signs of respiratory distress. Pt changed and turned, skin intact, no signs of pressure ulcer. Cardiac monitor applied.

## 2022-07-10 NOTE — ED PROVIDER NOTE - CLINICAL SUMMARY MEDICAL DECISION MAKING FREE TEXT BOX
Patient is a 89 year old male baseline dementia, Afib on Eliquis, DM BIBEMS at the request of the patient's daughter due to lethargy, fatigue, worsening cognitive function and cold-like symptoms with known COVID-19 exposure. Patient febrile and exam remarkable for b/l wheezes at the bases and tachypnea; not in respiratory distress. Plan for duoneb and decadron. Complex past medical history including cardiac comorbidities. Pulmonary findings likely 2/2 infectious etiology/COVID-19. CXR and RVP pending. Meets SIRS criteria; CBC, Blood cultures, UA and UCx, CMP, VBG lactate, Coags. LR bolus. Low suspicion for a cardiac etiology to pulmonary findings but will f/u EKG and cardiac biomarkers. Decline in mental status and cognition likely 2/2 infectious etiology.

## 2022-07-10 NOTE — ED PROVIDER NOTE - PROGRESS NOTE DETAILS
No new complaints. Patient reassessed s/p duoneb without signs of respiratory distress. Mild tachypnea to 20. Unremarkable pulmonary exam. CXR clear. F/u UA. BNP 1500 but no clinical signs of volume overload. Arthur COLON (PGY-3)  pt attempting to get out of bed.. will give iv haldol, place 1:1  spoke to hospitalist who accepted pt for admission

## 2022-07-11 DIAGNOSIS — Z29.9 ENCOUNTER FOR PROPHYLACTIC MEASURES, UNSPECIFIED: ICD-10-CM

## 2022-07-11 DIAGNOSIS — U07.1 COVID-19: ICD-10-CM

## 2022-07-11 DIAGNOSIS — R41.82 ALTERED MENTAL STATUS, UNSPECIFIED: ICD-10-CM

## 2022-07-11 DIAGNOSIS — E11.9 TYPE 2 DIABETES MELLITUS WITHOUT COMPLICATIONS: ICD-10-CM

## 2022-07-11 DIAGNOSIS — I10 ESSENTIAL (PRIMARY) HYPERTENSION: ICD-10-CM

## 2022-07-11 DIAGNOSIS — F03.90 UNSPECIFIED DEMENTIA WITHOUT BEHAVIORAL DISTURBANCE: ICD-10-CM

## 2022-07-11 DIAGNOSIS — I48.91 UNSPECIFIED ATRIAL FIBRILLATION: ICD-10-CM

## 2022-07-11 LAB
ALBUMIN SERPL ELPH-MCNC: 3.9 G/DL — SIGNIFICANT CHANGE UP (ref 3.3–5)
ALP SERPL-CCNC: 95 U/L — SIGNIFICANT CHANGE UP (ref 40–120)
ALT FLD-CCNC: 18 U/L — SIGNIFICANT CHANGE UP (ref 4–41)
ANION GAP SERPL CALC-SCNC: 16 MMOL/L — HIGH (ref 7–14)
APPEARANCE UR: CLEAR — SIGNIFICANT CHANGE UP
AST SERPL-CCNC: 35 U/L — SIGNIFICANT CHANGE UP (ref 4–40)
BACTERIA # UR AUTO: ABNORMAL
BILIRUB SERPL-MCNC: 0.6 MG/DL — SIGNIFICANT CHANGE UP (ref 0.2–1.2)
BILIRUB UR-MCNC: NEGATIVE — SIGNIFICANT CHANGE UP
BUN SERPL-MCNC: 26 MG/DL — HIGH (ref 7–23)
CALCIUM SERPL-MCNC: 8.7 MG/DL — SIGNIFICANT CHANGE UP (ref 8.4–10.5)
CHLORIDE SERPL-SCNC: 99 MMOL/L — SIGNIFICANT CHANGE UP (ref 98–107)
CO2 SERPL-SCNC: 18 MMOL/L — LOW (ref 22–31)
COLOR SPEC: YELLOW — SIGNIFICANT CHANGE UP
CREAT SERPL-MCNC: 0.77 MG/DL — SIGNIFICANT CHANGE UP (ref 0.5–1.3)
CRP SERPL-MCNC: 105.1 MG/L — HIGH
D DIMER BLD IA.RAPID-MCNC: 332 NG/ML DDU — HIGH
DIFF PNL FLD: ABNORMAL
EGFR: 86 ML/MIN/1.73M2 — SIGNIFICANT CHANGE UP
EPI CELLS # UR: 13 /HPF — HIGH (ref 0–5)
FERRITIN SERPL-MCNC: 454 NG/ML — HIGH (ref 30–400)
GLUCOSE SERPL-MCNC: 252 MG/DL — HIGH (ref 70–99)
GLUCOSE UR QL: NEGATIVE — SIGNIFICANT CHANGE UP
GRAN CASTS # UR COMP ASSIST: 2 /LPF — HIGH
HCT VFR BLD CALC: 37 % — LOW (ref 39–50)
HGB BLD-MCNC: 11.8 G/DL — LOW (ref 13–17)
HYALINE CASTS # UR AUTO: 10 /LPF — HIGH (ref 0–7)
KETONES UR-MCNC: ABNORMAL
LDH SERPL L TO P-CCNC: 282 U/L — HIGH (ref 135–225)
LEUKOCYTE ESTERASE UR-ACNC: NEGATIVE — SIGNIFICANT CHANGE UP
MAGNESIUM SERPL-MCNC: 1.9 MG/DL — SIGNIFICANT CHANGE UP (ref 1.6–2.6)
MCHC RBC-ENTMCNC: 31.7 PG — SIGNIFICANT CHANGE UP (ref 27–34)
MCHC RBC-ENTMCNC: 31.9 GM/DL — LOW (ref 32–36)
MCV RBC AUTO: 99.5 FL — SIGNIFICANT CHANGE UP (ref 80–100)
NITRITE UR-MCNC: NEGATIVE — SIGNIFICANT CHANGE UP
NRBC # BLD: 0 /100 WBCS — SIGNIFICANT CHANGE UP
NRBC # FLD: 0 K/UL — SIGNIFICANT CHANGE UP
PH UR: 6 — SIGNIFICANT CHANGE UP (ref 5–8)
PHOSPHATE SERPL-MCNC: 3.3 MG/DL — SIGNIFICANT CHANGE UP (ref 2.5–4.5)
PLATELET # BLD AUTO: 105 K/UL — LOW (ref 150–400)
POTASSIUM SERPL-MCNC: 4 MMOL/L — SIGNIFICANT CHANGE UP (ref 3.5–5.3)
POTASSIUM SERPL-SCNC: 4 MMOL/L — SIGNIFICANT CHANGE UP (ref 3.5–5.3)
PROT SERPL-MCNC: 7.3 G/DL — SIGNIFICANT CHANGE UP (ref 6–8.3)
PROT UR-MCNC: ABNORMAL
RBC # BLD: 3.72 M/UL — LOW (ref 4.2–5.8)
RBC # FLD: 13.7 % — SIGNIFICANT CHANGE UP (ref 10.3–14.5)
RBC CASTS # UR COMP ASSIST: 12 /HPF — HIGH (ref 0–4)
SODIUM SERPL-SCNC: 133 MMOL/L — LOW (ref 135–145)
SP GR SPEC: 1.03 — SIGNIFICANT CHANGE UP (ref 1–1.05)
TROPONIN T, HIGH SENSITIVITY RESULT: 42 NG/L — SIGNIFICANT CHANGE UP
UROBILINOGEN FLD QL: SIGNIFICANT CHANGE UP
WBC # BLD: 5.9 K/UL — SIGNIFICANT CHANGE UP (ref 3.8–10.5)
WBC # FLD AUTO: 5.9 K/UL — SIGNIFICANT CHANGE UP (ref 3.8–10.5)
WBC UR QL: 3 /HPF — SIGNIFICANT CHANGE UP (ref 0–5)

## 2022-07-11 RX ORDER — INSULIN LISPRO 100/ML
VIAL (ML) SUBCUTANEOUS
Refills: 0 | Status: DISCONTINUED | OUTPATIENT
Start: 2022-07-11 | End: 2022-07-13

## 2022-07-11 RX ORDER — SODIUM CHLORIDE 9 MG/ML
1000 INJECTION, SOLUTION INTRAVENOUS
Refills: 0 | Status: DISCONTINUED | OUTPATIENT
Start: 2022-07-11 | End: 2022-07-17

## 2022-07-11 RX ORDER — SIMVASTATIN 20 MG/1
20 TABLET, FILM COATED ORAL AT BEDTIME
Refills: 0 | Status: DISCONTINUED | OUTPATIENT
Start: 2022-07-11 | End: 2022-08-08

## 2022-07-11 RX ORDER — DEXTROSE 50 % IN WATER 50 %
12.5 SYRINGE (ML) INTRAVENOUS ONCE
Refills: 0 | Status: DISCONTINUED | OUTPATIENT
Start: 2022-07-11 | End: 2022-07-17

## 2022-07-11 RX ORDER — NEBIVOLOL HYDROCHLORIDE 5 MG/1
1 TABLET ORAL
Qty: 0 | Refills: 0 | DISCHARGE

## 2022-07-11 RX ORDER — DONEPEZIL HYDROCHLORIDE 10 MG/1
1 TABLET, FILM COATED ORAL
Qty: 0 | Refills: 0 | DISCHARGE

## 2022-07-11 RX ORDER — REMDESIVIR 5 MG/ML
200 INJECTION INTRAVENOUS EVERY 24 HOURS
Refills: 0 | Status: COMPLETED | OUTPATIENT
Start: 2022-07-11 | End: 2022-07-11

## 2022-07-11 RX ORDER — ACETAMINOPHEN 500 MG
650 TABLET ORAL EVERY 6 HOURS
Refills: 0 | Status: DISCONTINUED | OUTPATIENT
Start: 2022-07-11 | End: 2022-07-12

## 2022-07-11 RX ORDER — DONEPEZIL HYDROCHLORIDE 10 MG/1
5 TABLET, FILM COATED ORAL AT BEDTIME
Refills: 0 | Status: DISCONTINUED | OUTPATIENT
Start: 2022-07-11 | End: 2022-08-08

## 2022-07-11 RX ORDER — HALOPERIDOL DECANOATE 100 MG/ML
2.5 INJECTION INTRAMUSCULAR ONCE
Refills: 0 | Status: COMPLETED | OUTPATIENT
Start: 2022-07-11 | End: 2022-07-11

## 2022-07-11 RX ORDER — INSULIN LISPRO 100/ML
VIAL (ML) SUBCUTANEOUS AT BEDTIME
Refills: 0 | Status: DISCONTINUED | OUTPATIENT
Start: 2022-07-11 | End: 2022-07-13

## 2022-07-11 RX ORDER — APIXABAN 2.5 MG/1
5 TABLET, FILM COATED ORAL EVERY 12 HOURS
Refills: 0 | Status: DISCONTINUED | OUTPATIENT
Start: 2022-07-11 | End: 2022-07-19

## 2022-07-11 RX ORDER — ALBUTEROL 90 UG/1
2 AEROSOL, METERED ORAL EVERY 6 HOURS
Refills: 0 | Status: DISCONTINUED | OUTPATIENT
Start: 2022-07-11 | End: 2022-08-08

## 2022-07-11 RX ORDER — REMDESIVIR 5 MG/ML
100 INJECTION INTRAVENOUS EVERY 24 HOURS
Refills: 0 | Status: COMPLETED | OUTPATIENT
Start: 2022-07-12 | End: 2022-07-15

## 2022-07-11 RX ORDER — LANOLIN ALCOHOL/MO/W.PET/CERES
9 CREAM (GRAM) TOPICAL AT BEDTIME
Refills: 0 | Status: DISCONTINUED | OUTPATIENT
Start: 2022-07-11 | End: 2022-07-26

## 2022-07-11 RX ORDER — HALOPERIDOL DECANOATE 100 MG/ML
1 INJECTION INTRAMUSCULAR ONCE
Refills: 0 | Status: COMPLETED | OUTPATIENT
Start: 2022-07-11 | End: 2022-07-11

## 2022-07-11 RX ORDER — DEXTROSE 50 % IN WATER 50 %
25 SYRINGE (ML) INTRAVENOUS ONCE
Refills: 0 | Status: DISCONTINUED | OUTPATIENT
Start: 2022-07-11 | End: 2022-07-17

## 2022-07-11 RX ORDER — REMDESIVIR 5 MG/ML
INJECTION INTRAVENOUS
Refills: 0 | Status: COMPLETED | OUTPATIENT
Start: 2022-07-11 | End: 2022-07-15

## 2022-07-11 RX ORDER — DEXAMETHASONE 0.5 MG/5ML
6 ELIXIR ORAL EVERY 24 HOURS
Refills: 0 | Status: DISCONTINUED | OUTPATIENT
Start: 2022-07-11 | End: 2022-07-11

## 2022-07-11 RX ORDER — DEXTROSE 50 % IN WATER 50 %
15 SYRINGE (ML) INTRAVENOUS ONCE
Refills: 0 | Status: DISCONTINUED | OUTPATIENT
Start: 2022-07-11 | End: 2022-07-17

## 2022-07-11 RX ORDER — NEBIVOLOL HYDROCHLORIDE 5 MG/1
5 TABLET ORAL EVERY 24 HOURS
Refills: 0 | Status: DISCONTINUED | OUTPATIENT
Start: 2022-07-11 | End: 2022-07-12

## 2022-07-11 RX ORDER — LOSARTAN POTASSIUM 100 MG/1
50 TABLET, FILM COATED ORAL DAILY
Refills: 0 | Status: DISCONTINUED | OUTPATIENT
Start: 2022-07-11 | End: 2022-07-18

## 2022-07-11 RX ORDER — SIMVASTATIN 20 MG/1
1 TABLET, FILM COATED ORAL
Qty: 0 | Refills: 0 | DISCHARGE

## 2022-07-11 RX ADMIN — Medication 2: at 12:33

## 2022-07-11 RX ADMIN — Medication 9 MILLIGRAM(S): at 22:52

## 2022-07-11 RX ADMIN — DONEPEZIL HYDROCHLORIDE 5 MILLIGRAM(S): 10 TABLET, FILM COATED ORAL at 22:51

## 2022-07-11 RX ADMIN — HALOPERIDOL DECANOATE 2.5 MILLIGRAM(S): 100 INJECTION INTRAMUSCULAR at 00:09

## 2022-07-11 RX ADMIN — HALOPERIDOL DECANOATE 1 MILLIGRAM(S): 100 INJECTION INTRAMUSCULAR at 19:01

## 2022-07-11 RX ADMIN — SIMVASTATIN 20 MILLIGRAM(S): 20 TABLET, FILM COATED ORAL at 22:51

## 2022-07-11 RX ADMIN — Medication 2: at 17:56

## 2022-07-11 RX ADMIN — LOSARTAN POTASSIUM 50 MILLIGRAM(S): 100 TABLET, FILM COATED ORAL at 12:29

## 2022-07-11 RX ADMIN — REMDESIVIR 200 MILLIGRAM(S): 5 INJECTION INTRAVENOUS at 14:29

## 2022-07-11 RX ADMIN — NEBIVOLOL HYDROCHLORIDE 5 MILLIGRAM(S): 5 TABLET ORAL at 14:29

## 2022-07-11 NOTE — H&P ADULT - PROBLEM SELECTOR PLAN 1
- Patient found to be covid positive, with first exposure on Wednesday July 6.  - he is phizer vaccinated and boosted x 2 - last in April 2022  - symptoms including coughing, sneezing, wheezing, mild tachypnea, fever 100.8F   - Oxygen stable on room air, not requiring supplemental oxygen   - s/p Decadron and duonebs in ED with improvement   - would consider starting remdesivir therapy given patient at risk for disease progression  - hold off on further decadron unless patient starts to require O2 therapy   - already on anticoagulation with eliquis therapy for Afib   - check D-dimer and inflammatory markers   - maintain isolation precautions  - f/u BCx 2 in lab for fever work up  - tylenol, robitussion, and albuterol HFA PRN for supportive care  - s/p 500cc LR in ED - hold off on further IVF for now given elevated proBNP though appears grossly euvolemic  - per family has not been eating or drinking well at home x 2-3 days - may need maintenance IVF if continues to not eat well - Patient found to be covid positive, with first exposure on Wednesday July 6.  - he is phizer vaccinated and boosted x 2 - last in April 2022  - symptoms including coughing, sneezing, wheezing, mild tachypnea, fever 100.8F   - Oxygen stable on room air, not requiring supplemental oxygen   - s/p Decadron and duonebs in ED with improvement   - started remdesivir therapy given patient at risk for disease progression  - hold off on further decadron unless patient starts to require O2 therapy   - already on anticoagulation with eliquis therapy for Afib   - check D-dimer and inflammatory markers   - maintain isolation precautions  - f/u BCx 2 in lab for fever work up  - tylenol, robitussion, and albuterol HFA PRN for supportive care  - s/p 500cc LR in ED - hold off on further IVF for now given elevated proBNP though appears grossly euvolemic  - per family has not been eating or drinking well at home x 2-3 days - may need maintenance IVF if continues to not eat well

## 2022-07-11 NOTE — H&P ADULT - MUSCULOSKELETAL
normal/ROM intact/no calf tenderness/normal gait/strength 5/5 bilateral upper extremities/strength 5/5 bilateral lower extremities

## 2022-07-11 NOTE — PATIENT PROFILE ADULT - FALL HARM RISK - HARM RISK INTERVENTIONS

## 2022-07-11 NOTE — H&P ADULT - HISTORY OF PRESENT ILLNESS
89M PMHx dementia (AAO x 2 at baseline), Afib on eliquis, DM, HTN, hard of hearing who presents with cough, sneezing, and increased confusion at home after Covid exposure Wednesday July 6. Of note, patient currently AAO x 1 is unable to provide any ROS or contribution to information regarding symptoms due to dementia/delirium. As per his son in law Dr. Zeinab Frost (cardiologist; [phone]), his baseline mental status is AAO x 2 usually, able to basically care for himself for the most part with help of HHA at home. Ambulates independently. As per my discussion with his daughter Maddie, patient has been coughing and sneezing and has been "sundowning" at home, more confused than baseline. He lives with his wife who is currently in ER as well, who also presented with Covid and cognitive decline/increasing confusion. Patient denies having any pain when asked in Divehi using , as well as staff at bedside who speaks Divehi. Denied having any other complaints but very limited history as noted above. In the ED, he was noted to have wheezing and mild tachypnea, was given duonebs and decadron, 500cc LR, and haldol 2.5mg IV for agitation. He was noted to have fever of 100.8F and be saturating well on room air.

## 2022-07-11 NOTE — H&P ADULT - PROBLEM SELECTOR PLAN 5
- BP elevated in ED to 160s  - continue home losartan 50mg daily and nebivolol as above   - monitor vitals q4h

## 2022-07-11 NOTE — H&P ADULT - NSHPSOCIALHISTORY_GEN_ALL_CORE
Lives with wife at home with HHA.  They both have dementia.  Ambulates independently, does have walker if needed.     Denies smoking, drinking, drugs.  Former cigar smoker.

## 2022-07-11 NOTE — H&P ADULT - PROBLEM SELECTOR PLAN 4
- Check HgA1c  - Patient takes Glipizide 10mg XL daily at home - hold while admitted  - cover with ISS while admitted   - monitor FS closely if decadron started   - Monitor FS AC/HS

## 2022-07-11 NOTE — H&P ADULT - RESPIRATORY
normal/clear to auscultation bilaterally/no wheezes/no rales/no rhonchi/no use of accessory muscles/breath sounds equal/respirations non-labored

## 2022-07-11 NOTE — ED ADULT NURSE REASSESSMENT NOTE - NS ED NURSE REASSESS COMMENT FT1
Pt resting in bed, NAD, Respirations are even and unlabored, no signs of respiratory distress. PCA at bedside for constant observation, to maintain safe environment.

## 2022-07-11 NOTE — H&P ADULT - NS ATTEND AMEND GEN_ALL_CORE FT
89/M with dementia (AAO x 2 at baseline), Afib on eliquis, DM, HTN, hard of hearing who presents with cough, sneezing, and increased confusion at home after Covid exposure Wednesday July 6. History mainly obtained from daughter alfonso and son in law Willis who reports that he started having symptoms after exposure from Aid on 6th. Yesterday symptoms got worse and he was having breathing difficulty brining him to hospital. Also reports that he have baseline dementia with mental status of AAOx2 but its worsening over last couple of months with acute decline related to infection.     Labs and scans: personally reviewed.     Physical exam:   General: elderly man  HEENT: ATNC  LUNGS: audible wheezing noted, no wheezing or rales on auscultation   HEART: RRR, +S1,S2  ABDOMEN: NDNT x 4 q's  EXT: Warm, well perfused    NEURO: AxOx1 , non focal     Plan:     # COVID-19 Infection   - baseline markers noted - trend as per dis progression   - on exam patient is tachypneic - on discussion with son in  who mentions that patient usually breaths like this, and he does not think this is much change from baseline   - currently satting 100% on 1L NC   - will start on RDV   - will hold off on dexa at this time  - full code for now - though family is leaning towards DNR/DNI     # Chronic afib   # Dementia   # DM  # HTN   -  mange ment as per above

## 2022-07-11 NOTE — H&P ADULT - GASTROINTESTINAL
normal/soft/nontender/nondistended/normal active bowel sounds/no guarding/no rigidity/no masses palpable

## 2022-07-11 NOTE — H&P ADULT - PROBLEM SELECTOR PLAN 6
- DVT ppx: patient already anticoagulated with eliquis 5mg BID  - Case and plan to be discussed with attending - DVT ppx: patient already anticoagulated with eliquis 5mg BID  - Case and plan discussed with Dr. Adamson

## 2022-07-11 NOTE — H&P ADULT - CARDIOVASCULAR
normal/S1 S2 present/no gallops/no rub/no murmur/no pedal edema/Irregularly irregular rhythm/vascular

## 2022-07-11 NOTE — ED ADULT NURSE REASSESSMENT NOTE - NS ED NURSE REASSESS COMMENT FT1
Report given to [facility] RN. Pt awake and alert, A&OX1-2 with periods of confusion, baseline mental status. NAD. Pt Denies CP, SOB, HA, N/V, palpitations, fatigue, dizziness, blurry vision. Resp even and unlabored.  Transferred at this time.

## 2022-07-11 NOTE — H&P ADULT - PROBLEM SELECTOR PLAN 3
- Rate controlled in 80s  - continue home eliquis 5mg BID   - continue home nebivolol 5mg daily   - EKG with RBBB/non specific ST/T changes likely in setting of RBBB; no reports of cardiac complaints   - TropT trend of 49->42 not consistent with ACS, likely mild demand from infection  - consider ECHO given elevated proBNP and indeterminate range Trops

## 2022-07-11 NOTE — ED ADULT NURSE REASSESSMENT NOTE - NS ED NURSE REASSESS COMMENT FT1
Pt resting in bed, NAD, PCA at bedside for constant observation and to maintain safe environment. Respirations are even and unlabored, no signs of respiratory distress. Pt resting in bed, NAD, PCA at bedside for constant observation and to maintain safe environment. Respirations are even and unlabored, no signs of respiratory distress. Pt changed and turned.

## 2022-07-11 NOTE — H&P ADULT - PROBLEM SELECTOR PLAN 2
- Patient is AAO x 2 at baseline, now is AAO x 1, appears quite confused   - was trying to get OOB in ED given haldol 2.5mg IV and put on 1:1 for safety   - Neuro exam is non focal  - worsening mental status likely in setting of toxic metabolic encephalopathy in setting of infection/Covid   - patient would benefit from being in the same room as his wife to help re-orient him, at families request; will speak to nursing staff to see if this can be accommodated  - continue constant observation for now for safety   - if mental status does not start to improve with treatment and improvement with other symptoms, or acute mental status change or change in neuro exam, consider CT head for further evaluation   - continue aricept 5mg daily   - avoid further haldol for the moment given QTc 501 on EKG  - Repeat EKG in AM

## 2022-07-11 NOTE — H&P ADULT - NSHPLABSRESULTS_GEN_ALL_CORE
12.0   5.40  )-----------( 106      ( 10 Jul 2022 22:22 )             37.5   07-10    135  |  98  |  27<H>  ----------------------------<  97  4.2   |  21<L>  |  1.01    Ca    9.4      10 Jul 2022 22:22    TPro  8.0  /  Alb  4.4  /  TBili  0.8  /  DBili  x   /  AST  39  /  ALT  19  /  AlkPhos  106  07-10    BCx x 2 pending in lab    UA small ketones, +squamous epi, 12 RBC, 3 WBC     pH 7.31, LA 1.7, pCO2 49, HCO3 25    TropT 49->42    ProBNP 1534    EKG: Afib @ 84bpm, QTc 501, RBBB, TWI V2 (in setting of RBBB) and III, STD V3 (in setting of RBB); non specific findings, no acute ischemic changes noted, no prior EKG to compare to     CXR: clear lungs

## 2022-07-11 NOTE — H&P ADULT - NSICDXPASTMEDICALHX_GEN_ALL_CORE_FT
PAST MEDICAL HISTORY:  Afib on eliquis    Cataract, bilateral     Dementia     Diabetes mellitus     Hard of hearing     Hypertension

## 2022-07-11 NOTE — H&P ADULT - ASSESSMENT
89M PMHx dementia (AAO x 2 at baseline), Afib on eliquis, DM, HTN, hard of hearing who presents with cough, sneezing, and increased confusion at home after Covid exposure Wednesday July 6, found to have Covid 19.

## 2022-07-12 LAB
A1C WITH ESTIMATED AVERAGE GLUCOSE RESULT: 6.1 % — HIGH (ref 4–5.6)
ALBUMIN SERPL ELPH-MCNC: 4 G/DL — SIGNIFICANT CHANGE UP (ref 3.3–5)
ALBUMIN SERPL ELPH-MCNC: 4.1 G/DL — SIGNIFICANT CHANGE UP (ref 3.3–5)
ALP SERPL-CCNC: 94 U/L — SIGNIFICANT CHANGE UP (ref 40–120)
ALP SERPL-CCNC: 95 U/L — SIGNIFICANT CHANGE UP (ref 40–120)
ALT FLD-CCNC: 23 U/L — SIGNIFICANT CHANGE UP (ref 4–41)
ALT FLD-CCNC: 24 U/L — SIGNIFICANT CHANGE UP (ref 4–41)
ANION GAP SERPL CALC-SCNC: 16 MMOL/L — HIGH (ref 7–14)
AST SERPL-CCNC: 47 U/L — HIGH (ref 4–40)
AST SERPL-CCNC: 48 U/L — HIGH (ref 4–40)
BILIRUB DIRECT SERPL-MCNC: 0.2 MG/DL — SIGNIFICANT CHANGE UP (ref 0–0.3)
BILIRUB INDIRECT FLD-MCNC: 0.5 MG/DL — SIGNIFICANT CHANGE UP (ref 0–1)
BILIRUB SERPL-MCNC: 0.7 MG/DL — SIGNIFICANT CHANGE UP (ref 0.2–1.2)
BILIRUB SERPL-MCNC: 0.7 MG/DL — SIGNIFICANT CHANGE UP (ref 0.2–1.2)
BUN SERPL-MCNC: 40 MG/DL — HIGH (ref 7–23)
CALCIUM SERPL-MCNC: 9.3 MG/DL — SIGNIFICANT CHANGE UP (ref 8.4–10.5)
CHLORIDE SERPL-SCNC: 100 MMOL/L — SIGNIFICANT CHANGE UP (ref 98–107)
CHOLEST SERPL-MCNC: 146 MG/DL — SIGNIFICANT CHANGE UP
CO2 SERPL-SCNC: 21 MMOL/L — LOW (ref 22–31)
CREAT SERPL-MCNC: 0.99 MG/DL — SIGNIFICANT CHANGE UP (ref 0.5–1.3)
CREAT SERPL-MCNC: 0.99 MG/DL — SIGNIFICANT CHANGE UP (ref 0.5–1.3)
CULTURE RESULTS: NO GROWTH — SIGNIFICANT CHANGE UP
EGFR: 73 ML/MIN/1.73M2 — SIGNIFICANT CHANGE UP
EGFR: 73 ML/MIN/1.73M2 — SIGNIFICANT CHANGE UP
ESTIMATED AVERAGE GLUCOSE: 128 — SIGNIFICANT CHANGE UP
GLUCOSE BLDC GLUCOMTR-MCNC: 199 MG/DL — HIGH (ref 70–99)
GLUCOSE BLDC GLUCOMTR-MCNC: 200 MG/DL — HIGH (ref 70–99)
GLUCOSE BLDC GLUCOMTR-MCNC: 219 MG/DL — HIGH (ref 70–99)
GLUCOSE SERPL-MCNC: 166 MG/DL — HIGH (ref 70–99)
HCT VFR BLD CALC: 38.8 % — LOW (ref 39–50)
HDLC SERPL-MCNC: 62 MG/DL — SIGNIFICANT CHANGE UP
HGB BLD-MCNC: 12.8 G/DL — LOW (ref 13–17)
INR BLD: 1.18 RATIO — HIGH (ref 0.88–1.16)
LIPID PNL WITH DIRECT LDL SERPL: 67 MG/DL — SIGNIFICANT CHANGE UP
MCHC RBC-ENTMCNC: 32.1 PG — SIGNIFICANT CHANGE UP (ref 27–34)
MCHC RBC-ENTMCNC: 33 GM/DL — SIGNIFICANT CHANGE UP (ref 32–36)
MCV RBC AUTO: 97.2 FL — SIGNIFICANT CHANGE UP (ref 80–100)
NON HDL CHOLESTEROL: 84 MG/DL — SIGNIFICANT CHANGE UP
NRBC # BLD: 0 /100 WBCS — SIGNIFICANT CHANGE UP
NRBC # FLD: 0 K/UL — SIGNIFICANT CHANGE UP
PLATELET # BLD AUTO: 143 K/UL — LOW (ref 150–400)
POTASSIUM SERPL-MCNC: 4.1 MMOL/L — SIGNIFICANT CHANGE UP (ref 3.5–5.3)
POTASSIUM SERPL-SCNC: 4.1 MMOL/L — SIGNIFICANT CHANGE UP (ref 3.5–5.3)
PROT SERPL-MCNC: 7.7 G/DL — SIGNIFICANT CHANGE UP (ref 6–8.3)
PROT SERPL-MCNC: 7.8 G/DL — SIGNIFICANT CHANGE UP (ref 6–8.3)
PROTHROM AB SERPL-ACNC: 13.7 SEC — HIGH (ref 10.5–13.4)
RBC # BLD: 3.99 M/UL — LOW (ref 4.2–5.8)
RBC # FLD: 13.8 % — SIGNIFICANT CHANGE UP (ref 10.3–14.5)
SODIUM SERPL-SCNC: 137 MMOL/L — SIGNIFICANT CHANGE UP (ref 135–145)
SPECIMEN SOURCE: SIGNIFICANT CHANGE UP
TRIGL SERPL-MCNC: 84 MG/DL — SIGNIFICANT CHANGE UP
WBC # BLD: 13.71 K/UL — HIGH (ref 3.8–10.5)
WBC # FLD AUTO: 13.71 K/UL — HIGH (ref 3.8–10.5)

## 2022-07-12 PROCEDURE — 99233 SBSQ HOSP IP/OBS HIGH 50: CPT

## 2022-07-12 PROCEDURE — 93010 ELECTROCARDIOGRAM REPORT: CPT

## 2022-07-12 RX ORDER — ACETAMINOPHEN 500 MG
650 TABLET ORAL EVERY 4 HOURS
Refills: 0 | Status: DISCONTINUED | OUTPATIENT
Start: 2022-07-12 | End: 2022-08-08

## 2022-07-12 RX ORDER — SODIUM CHLORIDE 9 MG/ML
500 INJECTION INTRAMUSCULAR; INTRAVENOUS; SUBCUTANEOUS
Refills: 0 | Status: DISCONTINUED | OUTPATIENT
Start: 2022-07-12 | End: 2022-07-13

## 2022-07-12 RX ORDER — METOPROLOL TARTRATE 50 MG
5 TABLET ORAL ONCE
Refills: 0 | Status: COMPLETED | OUTPATIENT
Start: 2022-07-12 | End: 2022-07-12

## 2022-07-12 RX ORDER — ACETAMINOPHEN 500 MG
750 TABLET ORAL ONCE
Refills: 0 | Status: COMPLETED | OUTPATIENT
Start: 2022-07-12 | End: 2022-07-12

## 2022-07-12 RX ORDER — METOPROLOL TARTRATE 50 MG
2.5 TABLET ORAL ONCE
Refills: 0 | Status: COMPLETED | OUTPATIENT
Start: 2022-07-12 | End: 2022-07-12

## 2022-07-12 RX ORDER — METOPROLOL TARTRATE 50 MG
25 TABLET ORAL
Refills: 0 | Status: DISCONTINUED | OUTPATIENT
Start: 2022-07-12 | End: 2022-07-13

## 2022-07-12 RX ORDER — DEXAMETHASONE 0.5 MG/5ML
6 ELIXIR ORAL DAILY
Refills: 0 | Status: DISCONTINUED | OUTPATIENT
Start: 2022-07-12 | End: 2022-07-19

## 2022-07-12 RX ORDER — METOPROLOL TARTRATE 50 MG
5 TABLET ORAL EVERY 6 HOURS
Refills: 0 | Status: DISCONTINUED | OUTPATIENT
Start: 2022-07-12 | End: 2022-08-08

## 2022-07-12 RX ORDER — HALOPERIDOL DECANOATE 100 MG/ML
1 INJECTION INTRAMUSCULAR ONCE
Refills: 0 | Status: COMPLETED | OUTPATIENT
Start: 2022-07-12 | End: 2022-07-12

## 2022-07-12 RX ORDER — METOPROLOL TARTRATE 50 MG
25 TABLET ORAL ONCE
Refills: 0 | Status: COMPLETED | OUTPATIENT
Start: 2022-07-12 | End: 2022-07-12

## 2022-07-12 RX ADMIN — Medication 5 MILLIGRAM(S): at 17:36

## 2022-07-12 RX ADMIN — Medication 1: at 13:02

## 2022-07-12 RX ADMIN — SODIUM CHLORIDE 75 MILLILITER(S): 9 INJECTION INTRAMUSCULAR; INTRAVENOUS; SUBCUTANEOUS at 23:13

## 2022-07-12 RX ADMIN — Medication 5 MILLIGRAM(S): at 09:27

## 2022-07-12 RX ADMIN — APIXABAN 5 MILLIGRAM(S): 2.5 TABLET, FILM COATED ORAL at 08:41

## 2022-07-12 RX ADMIN — Medication 750 MILLIGRAM(S): at 22:40

## 2022-07-12 RX ADMIN — Medication 2.5 MILLIGRAM(S): at 21:08

## 2022-07-12 RX ADMIN — Medication 650 MILLIGRAM(S): at 15:15

## 2022-07-12 RX ADMIN — HALOPERIDOL DECANOATE 1 MILLIGRAM(S): 100 INJECTION INTRAMUSCULAR at 03:16

## 2022-07-12 RX ADMIN — Medication 25 MILLIGRAM(S): at 23:13

## 2022-07-12 RX ADMIN — Medication 300 MILLIGRAM(S): at 21:03

## 2022-07-12 RX ADMIN — DONEPEZIL HYDROCHLORIDE 5 MILLIGRAM(S): 10 TABLET, FILM COATED ORAL at 21:06

## 2022-07-12 RX ADMIN — Medication 9 MILLIGRAM(S): at 21:07

## 2022-07-12 RX ADMIN — Medication 25 MILLIGRAM(S): at 12:05

## 2022-07-12 RX ADMIN — Medication 650 MILLIGRAM(S): at 10:00

## 2022-07-12 RX ADMIN — Medication 650 MILLIGRAM(S): at 14:16

## 2022-07-12 RX ADMIN — Medication 1: at 17:36

## 2022-07-12 RX ADMIN — Medication 650 MILLIGRAM(S): at 09:00

## 2022-07-12 RX ADMIN — Medication 1: at 08:54

## 2022-07-12 RX ADMIN — REMDESIVIR 500 MILLIGRAM(S): 5 INJECTION INTRAVENOUS at 14:10

## 2022-07-12 RX ADMIN — Medication 6 MILLIGRAM(S): at 11:38

## 2022-07-12 RX ADMIN — SIMVASTATIN 20 MILLIGRAM(S): 20 TABLET, FILM COATED ORAL at 21:06

## 2022-07-12 RX ADMIN — SODIUM CHLORIDE 75 MILLILITER(S): 9 INJECTION INTRAMUSCULAR; INTRAVENOUS; SUBCUTANEOUS at 14:10

## 2022-07-12 RX ADMIN — LOSARTAN POTASSIUM 50 MILLIGRAM(S): 100 TABLET, FILM COATED ORAL at 06:23

## 2022-07-12 RX ADMIN — APIXABAN 5 MILLIGRAM(S): 2.5 TABLET, FILM COATED ORAL at 21:06

## 2022-07-12 NOTE — PROVIDER CONTACT NOTE (OTHER) - ASSESSMENT
Patient repeat rectal temp 101, patient with mild shortness of breath RR 26.  afib RVR and /86. O2 96% on 2L NC

## 2022-07-12 NOTE — PROGRESS NOTE ADULT - ASSESSMENT
89M PMHx dementia (AAO x 2 at baseline), Afib on eliquis, DM2, HTN, hard of hearing who presents with cough, sneezing, and increased confusion at home after Covid exposure Wednesday July 6, found to have Covid 19.

## 2022-07-12 NOTE — PROGRESS NOTE ADULT - PROBLEM SELECTOR PLAN 3
- afib with RVR likely driven by fever and infection  - continue home eliquis 5mg BID   - start lopressor 25bid  - EKG with RBBB/non specific ST/T changes likely in setting of RBBB; no reports of cardiac complaints   - TropT trend of 49->42 not consistent with ACS, likely mild demand from infection  - consider ECHO given elevated proBNP and indeterminate range Trops

## 2022-07-12 NOTE — CHART NOTE - NSCHARTNOTEFT_GEN_A_CORE
RN notified me that patient noted with rapid respirations. Examined patient at bedside, he is alert and oriented to person, not to place or time. Noted tachypnea, body warm to touch, moving air well - no crackles, rales or wheezing. Given hx of afib, placed on tele. Tele showing rapid afib to 130's.  Full set of vitals done: /86, O2 sat 96% on 2L, HR 135bpm, Temp 102.3F rectally. He is on Nebivolol 5mg, but not due until 2pm. Lopressor 5mg given stat and Tylenol 650mg given for fever. Will discuss with attending regarding steroid initiation.

## 2022-07-12 NOTE — PROGRESS NOTE ADULT - PROBLEM SELECTOR PLAN 1
- Patient found to be covid positive, with first exposure on Wednesday July 6.  - he is Pfizer vaccinated and boosted x 2 - last in April 2022  - symptoms including coughing, sneezing, wheezing, mild tachypnea, fever 102  -tachypneic on exam, 2 L NC   - s/p Decadron and duonebs in ED with improvement   - started remdesivir therapy given patient at risk for disease progression  - start dex for respiratory symptoms  - already on anticoagulation with eliquis therapy for Afib   - trend D-dimer and inflammatory markers   - maintain isolation precautions  - f/u BCx 2 in lab for fever work up  - tylenol, robitussion, and albuterol HFA PRN for supportive care

## 2022-07-12 NOTE — PROVIDER CONTACT NOTE (OTHER) - ASSESSMENT
Patient /74, HR 96, rectal temp 100.7, Respirations 40 with O2 @ 95% on 2L NC. Patient is diaphoretic

## 2022-07-12 NOTE — PROGRESS NOTE ADULT - PROBLEM SELECTOR PLAN 2
- Patient is AAO x 2 at baseline, now is AAO x 1, appears quite confused   - was trying to get OOB in ED given haldol 2.5mg IV and put on 1:1 for safety   - Neuro exam is non focal  - worsening mental status likely in setting of toxic metabolic encephalopathy in setting of infection/Covid   - patient would benefit from being in the same room as his wife to help re-orient him, at families request; will speak to nursing staff to see if this can be accommodated  - continue constant observation for now for safety   - if mental status does not start to improve with treatment and improvement with other symptoms, or acute mental status change or change in neuro exam, consider CT head for further evaluation   - continue aricept 5mg daily

## 2022-07-12 NOTE — PROVIDER CONTACT NOTE (OTHER) - ASSESSMENT
Patient denies any distress or discomfort. Upon assessment, no distention of bladder. Fluids running at 75mL/hr

## 2022-07-12 NOTE — PROVIDER CONTACT NOTE (OTHER) - ASSESSMENT
Patient respirations 28, O2 96% on 2L, rectal temp s/p PO tylenol 100.3. Patient stated is comfortable

## 2022-07-12 NOTE — PROGRESS NOTE ADULT - CONVERSATION DETAILS
Spoke to daughter Maddie who is the HCP for patient. Discussed with her that patient is acutely ill from covid with elevated respiratory rate and fevers. Patient is high risk for decompensation given age. Daughter states that she discussed with her brother and would want patient to be DNR/DNI.

## 2022-07-12 NOTE — PROGRESS NOTE ADULT - PROBLEM SELECTOR PLAN 4
- Patient takes Glipizide 10mg XL daily at home - hold while admitted  - cover with ISS while admitted   - monitor FS closely   - Monitor FS AC/HS

## 2022-07-12 NOTE — PROGRESS NOTE ADULT - SUBJECTIVE AND OBJECTIVE BOX
Division of Hospital Medicine  Dr. Mindy Grider  Pager [phone]    Patient is a 89y old  Male who presents with a chief complaint of Increasing confusion, cold like symptoms, covid exposure (2022 08:33)      SUBJECTIVE / OVERNIGHT EVENTS: patient seen and examined.  used to obtain history. Patient is [place] and unable to respond to .  AAOx1 to self. Still febrile. Afib with rvr on tele    MEDICATIONS  (STANDING):  apixaban 5 milliGRAM(s) Oral every 12 hours  dexAMETHasone  Injectable 6 milliGRAM(s) IV Push daily  dextrose 5%. 1000 milliLiter(s) (100 mL/Hr) IV Continuous <Continuous>  dextrose 5%. 1000 milliLiter(s) (50 mL/Hr) IV Continuous <Continuous>  dextrose 50% Injectable 25 Gram(s) IV Push once  dextrose 50% Injectable 12.5 Gram(s) IV Push once  dextrose 50% Injectable 25 Gram(s) IV Push once  donepezil 5 milliGRAM(s) Oral at bedtime  insulin lispro (ADMELOG) corrective regimen sliding scale   SubCutaneous three times a day before meals  insulin lispro (ADMELOG) corrective regimen sliding scale   SubCutaneous at bedtime  losartan 50 milliGRAM(s) Oral daily  melatonin 9 milliGRAM(s) Oral at bedtime  metoprolol tartrate 25 milliGRAM(s) Oral two times a day  remdesivir  IVPB   IV Intermittent   remdesivir  IVPB 100 milliGRAM(s) IV Intermittent every 24 hours  simvastatin 20 milliGRAM(s) Oral at bedtime    MEDICATIONS  (PRN):  acetaminophen     Tablet .. 650 milliGRAM(s) Oral every 6 hours PRN Temp greater or equal to 38C (100.4F), Severe Pain (7 - 10)  ALBUTerol    90 MICROgram(s) HFA Inhaler 2 Puff(s) Inhalation every 6 hours PRN Bronchospasm  dextrose Oral Gel 15 Gram(s) Oral once PRN Blood Glucose LESS THAN 70 milliGRAM(s)/deciliter  guaiFENesin Oral Liquid (Sugar-Free) 100 milliGRAM(s) Oral every 6 hours PRN Cough        CAPILLARY BLOOD GLUCOSE  POCT Blood Glucose.: 200 mg/dL (2022 12:32)  POCT Blood Glucose.: 153 mg/dL (2022 08:45)  POCT Blood Glucose.: 191 mg/dL (2022 22:25)  POCT Blood Glucose.: 213 mg/dL (2022 17:34)    I&O's Summary    Vital Signs Last 24 Hrs  T(C): 38.3 (2022 11:50), Max: 39.1 (2022 09:02)  T(F): 101 (2022 11:50), Max: 102.3 (2022 09:02)  HR: 140 (2022 12:00) (66 - 140)  BP: 154/76 (2022 12:00) (124/68 - 185/90)  BP(mean): --  RR: 28 (2022 11:50) (18 - 46)  SpO2: 96% (2022 11:50) (80% - 100%)    Parameters below as of 2022 11:50  Patient On (Oxygen Delivery Method): nasal cannula  O2 Flow (L/min): 2      PHYSICAL EXAM:  GENERAL: NAD, well-developed, awake, not agitated during my eval but per staff was trying to get off of bed earlier   HEAD:  Atraumatic, Normocephalic  EYES: EOMI, PERRLA, conjunctiva and sclera clear  NECK: Supple, No JVD  CHEST/LUNG: Clear to auscultation bilaterally; No wheeze  HEART:+tachy  ABDOMEN: Soft, Nontender, Nondistended; Bowel sounds present  EXTREMITIES:  2+ Peripheral Pulses, No clubbing, cyanosis, or edema  PSYCH: AAOx1  NEUROLOGY: non-focal  SKIN: No rashes or lesions    LABS:                        12.8   13.71 )-----------( 143      ( 2022 06:00 )             38.8     07-12    137  |  100  |  40<H>  ----------------------------<  166<H>  4.1   |  21<L>  |  0.99    Ca    9.3      2022 06:00  Phos  3.3     07-11  Mg     1.90     07-11    TPro  7.7  /  Alb  4.0  /  TBili  0.7  /  DBili  0.2  /  AST  48<H>  /  ALT  23  /  AlkPhos  94  07-12    PT/INR - ( 2022 06:00 )   PT: 13.7 sec;   INR: 1.18 ratio         PTT - ( 10 Jul 2022 22:22 )  PTT:29.3 sec      Urinalysis Basic - ( 2022 01:15 )    Color: Yellow / Appearance: Clear / S.029 / pH: x  Gluc: x / Ketone: Small  / Bili: Negative / Urobili: <2 mg/dL   Blood: x / Protein: 30 mg/dL / Nitrite: Negative   Leuk Esterase: Negative / RBC: 12 /HPF / WBC 3 /HPF   Sq Epi: x / Non Sq Epi: 13 /HPF / Bacteria: Occasional      Respiratory Viral Panel with COVID-19 by CATRACHO (07.10.22 @ 22:22)    Rapid RVP Result: Detected    SARS-CoV-2: Detected: This Respiratory Panel uses polymerase chain reaction (PCR) to detect for  adenovirus; coronavirus (HKU1, NL63, 229E, OC43); human metapneumovirus  (hMPV); human enterovirus/rhinovirus (Entero/RV); influenza A; influenza  A/H1; influenza A/H3; influenza A/H1-2009; influenza B; parainfluenza  viruses 1, 2, 3, 4; respiratory syncytial virus; Mycoplasma pneumoniae;  Chlamydophila pneumoniae; and SARS-CoV-2.    Adenovirus (RapRVP): NotDetec    Influenza A (RapRVP): NotDetec    Influenza B (RapRVP): NotDetec    Parainfluenza 1 (RapRVP): NotDetec    Parainfluenza 2 (RapRVP): NotDetec    Parainfluenza 3 (RapRVP): NotDetec    Parainfluenza 4 (RapRVP): NotDetec    Resp Syncytial Virus (RapRVP): NotDetec    Bordetella pertussis (RapRVP): NotDetec    Bordetella parapertussis (RapRVP): NotDetec    Chlamydia pneumoniae (RapRVP): NotDetec    Mycoplasma pneumoniae (RapRVP): NotDetec    Entero/Rhinovirus (RapRVP): NotDetec    HKU1 Coronavirus (RapRVP): NotDetec    NL63 Coronavirus (RapRVP): NotDetec    229E Coronavirus (RapRVP): NotDetec    OC43 Coronavirus (RapRVP): NotDetec    hMPV (RapRVP): NotDetec      RADIOLOGY & ADDITIONAL TESTS: < from: Xray Chest 1 View- PORTABLE-Urgent (07.10.22 @ 23:49) >  MPRESSION:    No focal consolidation.    < end of copied text >      Imaging Personally Reviewed:    Consultant(s) Notes Reviewed:      Care Discussed with Consultants/Other Providers:    Assessment and Plan:

## 2022-07-12 NOTE — PROVIDER CONTACT NOTE (OTHER) - ASSESSMENT
HR afib 's, /86, RR 42, rectal temp 102.3. Patient visibly short of breath. Denies any chest pain. HR afib 's, /85, RR 46, rectal temp 102.3, O2 96% on 2L NC. Patient visibly short of breath. Denies any chest pain.

## 2022-07-13 LAB
ALBUMIN SERPL ELPH-MCNC: 3.4 G/DL — SIGNIFICANT CHANGE UP (ref 3.3–5)
ALP SERPL-CCNC: 72 U/L — SIGNIFICANT CHANGE UP (ref 40–120)
ALT FLD-CCNC: 23 U/L — SIGNIFICANT CHANGE UP (ref 4–41)
ANION GAP SERPL CALC-SCNC: 15 MMOL/L — HIGH (ref 7–14)
ANISOCYTOSIS BLD QL: SLIGHT — SIGNIFICANT CHANGE UP
AST SERPL-CCNC: 49 U/L — HIGH (ref 4–40)
BASOPHILS # BLD AUTO: 0 K/UL — SIGNIFICANT CHANGE UP (ref 0–0.2)
BASOPHILS NFR BLD AUTO: 0 % — SIGNIFICANT CHANGE UP (ref 0–2)
BILIRUB DIRECT SERPL-MCNC: <0.2 MG/DL — SIGNIFICANT CHANGE UP (ref 0–0.3)
BILIRUB INDIRECT FLD-MCNC: >0.3 MG/DL — SIGNIFICANT CHANGE UP (ref 0–1)
BILIRUB SERPL-MCNC: 0.5 MG/DL — SIGNIFICANT CHANGE UP (ref 0.2–1.2)
BUN SERPL-MCNC: 67 MG/DL — HIGH (ref 7–23)
BURR CELLS BLD QL SMEAR: PRESENT — SIGNIFICANT CHANGE UP
CALCIUM SERPL-MCNC: 8.7 MG/DL — SIGNIFICANT CHANGE UP (ref 8.4–10.5)
CHLORIDE SERPL-SCNC: 109 MMOL/L — HIGH (ref 98–107)
CO2 SERPL-SCNC: 18 MMOL/L — LOW (ref 22–31)
CREAT SERPL-MCNC: 1.08 MG/DL — SIGNIFICANT CHANGE UP (ref 0.5–1.3)
CREAT SERPL-MCNC: 1.09 MG/DL — SIGNIFICANT CHANGE UP (ref 0.5–1.3)
EGFR: 65 ML/MIN/1.73M2 — SIGNIFICANT CHANGE UP
EGFR: 66 ML/MIN/1.73M2 — SIGNIFICANT CHANGE UP
EOSINOPHIL # BLD AUTO: 0 K/UL — SIGNIFICANT CHANGE UP (ref 0–0.5)
EOSINOPHIL NFR BLD AUTO: 0 % — SIGNIFICANT CHANGE UP (ref 0–6)
GIANT PLATELETS BLD QL SMEAR: PRESENT — SIGNIFICANT CHANGE UP
GLUCOSE BLDC GLUCOMTR-MCNC: 292 MG/DL — HIGH (ref 70–99)
GLUCOSE BLDC GLUCOMTR-MCNC: 327 MG/DL — HIGH (ref 70–99)
GLUCOSE BLDC GLUCOMTR-MCNC: 332 MG/DL — HIGH (ref 70–99)
GLUCOSE BLDC GLUCOMTR-MCNC: 348 MG/DL — HIGH (ref 70–99)
GLUCOSE SERPL-MCNC: 273 MG/DL — HIGH (ref 70–99)
HCT VFR BLD CALC: 33.9 % — LOW (ref 39–50)
HGB BLD-MCNC: 11.1 G/DL — LOW (ref 13–17)
IANC: 10.06 K/UL — HIGH (ref 1.8–7.4)
INR BLD: 2.07 RATIO — HIGH (ref 0.88–1.16)
LYMPHOCYTES # BLD AUTO: 0.77 K/UL — LOW (ref 1–3.3)
LYMPHOCYTES # BLD AUTO: 7 % — LOW (ref 13–44)
MACROCYTES BLD QL: SLIGHT — SIGNIFICANT CHANGE UP
MAGNESIUM SERPL-MCNC: 2.1 MG/DL — SIGNIFICANT CHANGE UP (ref 1.6–2.6)
MANUAL SMEAR VERIFICATION: SIGNIFICANT CHANGE UP
MCHC RBC-ENTMCNC: 32.2 PG — SIGNIFICANT CHANGE UP (ref 27–34)
MCHC RBC-ENTMCNC: 32.7 GM/DL — SIGNIFICANT CHANGE UP (ref 32–36)
MCV RBC AUTO: 98.3 FL — SIGNIFICANT CHANGE UP (ref 80–100)
MONOCYTES # BLD AUTO: 0.29 K/UL — SIGNIFICANT CHANGE UP (ref 0–0.9)
MONOCYTES NFR BLD AUTO: 2.6 % — SIGNIFICANT CHANGE UP (ref 2–14)
NEUTROPHILS # BLD AUTO: 9.82 K/UL — HIGH (ref 1.8–7.4)
NEUTROPHILS NFR BLD AUTO: 84.4 % — HIGH (ref 43–77)
NEUTS BAND # BLD: 4.3 % — SIGNIFICANT CHANGE UP (ref 0–6)
PHOSPHATE SERPL-MCNC: 2.7 MG/DL — SIGNIFICANT CHANGE UP (ref 2.5–4.5)
PLAT MORPH BLD: ABNORMAL
PLATELET # BLD AUTO: 130 K/UL — LOW (ref 150–400)
PLATELET COUNT - ESTIMATE: ABNORMAL
POIKILOCYTOSIS BLD QL AUTO: SIGNIFICANT CHANGE UP
POLYCHROMASIA BLD QL SMEAR: SLIGHT — SIGNIFICANT CHANGE UP
POTASSIUM SERPL-MCNC: 4.7 MMOL/L — SIGNIFICANT CHANGE UP (ref 3.5–5.3)
POTASSIUM SERPL-SCNC: 4.7 MMOL/L — SIGNIFICANT CHANGE UP (ref 3.5–5.3)
PROT SERPL-MCNC: 6.5 G/DL — SIGNIFICANT CHANGE UP (ref 6–8.3)
PROTHROM AB SERPL-ACNC: 24.2 SEC — HIGH (ref 10.5–13.4)
RBC # BLD: 3.45 M/UL — LOW (ref 4.2–5.8)
RBC # FLD: 14.2 % — SIGNIFICANT CHANGE UP (ref 10.3–14.5)
RBC BLD AUTO: ABNORMAL
SCHISTOCYTES BLD QL AUTO: SLIGHT — SIGNIFICANT CHANGE UP
SODIUM SERPL-SCNC: 142 MMOL/L — SIGNIFICANT CHANGE UP (ref 135–145)
VARIANT LYMPHS # BLD: 1.7 % — SIGNIFICANT CHANGE UP (ref 0–6)
WBC # BLD: 11.07 K/UL — HIGH (ref 3.8–10.5)
WBC # FLD AUTO: 11.07 K/UL — HIGH (ref 3.8–10.5)

## 2022-07-13 PROCEDURE — 93306 TTE W/DOPPLER COMPLETE: CPT | Mod: 26

## 2022-07-13 PROCEDURE — 71045 X-RAY EXAM CHEST 1 VIEW: CPT | Mod: 26

## 2022-07-13 PROCEDURE — 99233 SBSQ HOSP IP/OBS HIGH 50: CPT

## 2022-07-13 RX ORDER — INSULIN LISPRO 100/ML
VIAL (ML) SUBCUTANEOUS
Refills: 0 | Status: DISCONTINUED | OUTPATIENT
Start: 2022-07-13 | End: 2022-07-17

## 2022-07-13 RX ORDER — INSULIN LISPRO 100/ML
2 VIAL (ML) SUBCUTANEOUS
Refills: 0 | Status: DISCONTINUED | OUTPATIENT
Start: 2022-07-13 | End: 2022-07-14

## 2022-07-13 RX ORDER — INSULIN LISPRO 100/ML
VIAL (ML) SUBCUTANEOUS AT BEDTIME
Refills: 0 | Status: DISCONTINUED | OUTPATIENT
Start: 2022-07-13 | End: 2022-07-17

## 2022-07-13 RX ORDER — GLUCAGON INJECTION, SOLUTION 0.5 MG/.1ML
1 INJECTION, SOLUTION SUBCUTANEOUS ONCE
Refills: 0 | Status: DISCONTINUED | OUTPATIENT
Start: 2022-07-13 | End: 2022-07-17

## 2022-07-13 RX ORDER — METOPROLOL TARTRATE 50 MG
50 TABLET ORAL
Refills: 0 | Status: DISCONTINUED | OUTPATIENT
Start: 2022-07-13 | End: 2022-07-23

## 2022-07-13 RX ORDER — INSULIN GLARGINE 100 [IU]/ML
5 INJECTION, SOLUTION SUBCUTANEOUS AT BEDTIME
Refills: 0 | Status: DISCONTINUED | OUTPATIENT
Start: 2022-07-13 | End: 2022-07-14

## 2022-07-13 RX ORDER — FUROSEMIDE 40 MG
20 TABLET ORAL ONCE
Refills: 0 | Status: COMPLETED | OUTPATIENT
Start: 2022-07-13 | End: 2022-07-13

## 2022-07-13 RX ADMIN — SODIUM CHLORIDE 75 MILLILITER(S): 9 INJECTION INTRAMUSCULAR; INTRAVENOUS; SUBCUTANEOUS at 13:54

## 2022-07-13 RX ADMIN — Medication 20 MILLIGRAM(S): at 17:50

## 2022-07-13 RX ADMIN — Medication 9 MILLIGRAM(S): at 21:24

## 2022-07-13 RX ADMIN — Medication 650 MILLIGRAM(S): at 11:14

## 2022-07-13 RX ADMIN — Medication 650 MILLIGRAM(S): at 01:37

## 2022-07-13 RX ADMIN — Medication 2: at 22:35

## 2022-07-13 RX ADMIN — Medication 5 MILLIGRAM(S): at 06:52

## 2022-07-13 RX ADMIN — Medication 4: at 09:02

## 2022-07-13 RX ADMIN — LOSARTAN POTASSIUM 50 MILLIGRAM(S): 100 TABLET, FILM COATED ORAL at 05:54

## 2022-07-13 RX ADMIN — REMDESIVIR 500 MILLIGRAM(S): 5 INJECTION INTRAVENOUS at 13:52

## 2022-07-13 RX ADMIN — SODIUM CHLORIDE 75 MILLILITER(S): 9 INJECTION INTRAMUSCULAR; INTRAVENOUS; SUBCUTANEOUS at 05:48

## 2022-07-13 RX ADMIN — Medication 8: at 13:15

## 2022-07-13 RX ADMIN — APIXABAN 5 MILLIGRAM(S): 2.5 TABLET, FILM COATED ORAL at 21:24

## 2022-07-13 RX ADMIN — DONEPEZIL HYDROCHLORIDE 5 MILLIGRAM(S): 10 TABLET, FILM COATED ORAL at 21:25

## 2022-07-13 RX ADMIN — SIMVASTATIN 20 MILLIGRAM(S): 20 TABLET, FILM COATED ORAL at 21:24

## 2022-07-13 RX ADMIN — Medication 650 MILLIGRAM(S): at 02:15

## 2022-07-13 RX ADMIN — INSULIN GLARGINE 5 UNIT(S): 100 INJECTION, SOLUTION SUBCUTANEOUS at 22:36

## 2022-07-13 RX ADMIN — Medication 50 MILLIGRAM(S): at 17:07

## 2022-07-13 RX ADMIN — Medication 6 MILLIGRAM(S): at 05:49

## 2022-07-13 RX ADMIN — Medication 2 UNIT(S): at 18:52

## 2022-07-13 RX ADMIN — Medication 8: at 18:52

## 2022-07-13 RX ADMIN — Medication 650 MILLIGRAM(S): at 12:15

## 2022-07-13 RX ADMIN — APIXABAN 5 MILLIGRAM(S): 2.5 TABLET, FILM COATED ORAL at 09:02

## 2022-07-13 NOTE — PROVIDER CONTACT NOTE (OTHER) - ASSESSMENT
Rectal temperature 100.4, /80, HR 94, O2 100% on 2L with RR at 26. Patient denies any discomfort at this time. Patient lethargic, however arousable and only oriented to name, currently A&O x1

## 2022-07-13 NOTE — DIETITIAN INITIAL EVALUATION ADULT - PHYSCIAL ASSESSMENT
Unable to obtain consent from patient given patient is AxOx0. Patient visibly with severe fat loss and muscle wasting./debilitated

## 2022-07-13 NOTE — PHYSICAL THERAPY INITIAL EVALUATION ADULT - PERTINENT HX OF CURRENT PROBLEM, REHAB EVAL
Pt is an 89 year old male presenting with cough, sneezing and increased confusion. Found to be COVID-19 (+). PMH listed below.

## 2022-07-13 NOTE — DIETITIAN INITIAL EVALUATION ADULT - PERTINENT LABORATORY DATA
07-13    x   |  x   |  x   ----------------------------<  x   x    |  x   |  1.09    Ca    8.7      13 Jul 2022 03:00  Phos  2.7     07-13  Mg     2.10     07-13    TPro  6.5  /  Alb  3.4  /  TBili  0.5  /  DBili  <0.2  /  AST  49<H>  /  ALT  23  /  AlkPhos  72  07-13  POCT Blood Glucose.: 332 mg/dL (07-13-22 @ 08:22)  A1C with Estimated Average Glucose Result: 6.1 % (07-12-22 @ 06:00)

## 2022-07-13 NOTE — DIETITIAN NUTRITION RISK NOTIFICATION - UPON NUTRITIONAL ASSESSMENT BY THE REGISTERED DIETITIAN YOUR PATIENT WAS DETERMINED TO MEET CRITERIA/HAS EVIDENCE OF THE FOLLOWING DIAGNOSIS:
Hello team!    I am looking into coverage through Medicare B for the [employer] and I have a question. I do not see anything in the chart notes or the medication list about the patient being on insulin and I wanted to confirm that information before moving forward. In order for Medicare to pay for this item the patient would need to be type 1 or 2DM, testing blood sugars 4x/day and injecting insulin 3x/day or on an insulin pump. Please let me know if you have any questions!    Thanks so much!     Severe protein-calorie malnutrition

## 2022-07-13 NOTE — DIETITIAN INITIAL EVALUATION ADULT - NS FNS DIET ORDER
Diet, DASH/TLC:   Sodium & Cholesterol Restricted  Consistent Carbohydrate {No Snacks} (CSTCHO)  Soft and Bite Sized (SOFTBTSZ) (07-11-22 @ 12:23)

## 2022-07-13 NOTE — PHYSICAL THERAPY INITIAL EVALUATION ADULT - ADDITIONAL COMMENTS
Pt unable to provide information regarding prior level of function and living situation. Information obtained from care coordinator note. Pt lives in a house with + stairs to negotiate. Pt has a home health aide 3-4x/wk x 4-6hrs/day. Prior to admission, pt ambulated without an assistive device.

## 2022-07-13 NOTE — DIETITIAN INITIAL EVALUATION ADULT - PERTINENT MEDS FT
MEDICATIONS  (STANDING):  apixaban 5 milliGRAM(s) Oral every 12 hours  dexAMETHasone  Injectable 6 milliGRAM(s) IV Push daily  dextrose 5%. 1000 milliLiter(s) (50 mL/Hr) IV Continuous <Continuous>  dextrose 5%. 1000 milliLiter(s) (100 mL/Hr) IV Continuous <Continuous>  dextrose 50% Injectable 25 Gram(s) IV Push once  dextrose 50% Injectable 12.5 Gram(s) IV Push once  dextrose 50% Injectable 25 Gram(s) IV Push once  donepezil 5 milliGRAM(s) Oral at bedtime  glucagon  Injectable 1 milliGRAM(s) IntraMuscular once  insulin lispro (ADMELOG) corrective regimen sliding scale   SubCutaneous three times a day before meals  insulin lispro (ADMELOG) corrective regimen sliding scale   SubCutaneous at bedtime  losartan 50 milliGRAM(s) Oral daily  melatonin 9 milliGRAM(s) Oral at bedtime  metoprolol tartrate 50 milliGRAM(s) Oral two times a day  remdesivir  IVPB   IV Intermittent   remdesivir  IVPB 100 milliGRAM(s) IV Intermittent every 24 hours  simvastatin 20 milliGRAM(s) Oral at bedtime  sodium chloride 0.9%. 500 milliLiter(s) (75 mL/Hr) IV Continuous <Continuous>    MEDICATIONS  (PRN):  acetaminophen     Tablet .. 650 milliGRAM(s) Oral every 4 hours PRN Temp greater or equal to 38C (100.4F), Severe Pain (7 - 10)  ALBUTerol    90 MICROgram(s) HFA Inhaler 2 Puff(s) Inhalation every 6 hours PRN Bronchospasm  dextrose Oral Gel 15 Gram(s) Oral once PRN Blood Glucose LESS THAN 70 milliGRAM(s)/deciliter  guaiFENesin Oral Liquid (Sugar-Free) 100 milliGRAM(s) Oral every 6 hours PRN Cough  metoprolol tartrate Injectable 5 milliGRAM(s) IV Push every 6 hours PRN sustained HR over 140 bpm

## 2022-07-13 NOTE — PROGRESS NOTE ADULT - PROBLEM SELECTOR PLAN 3
- afib with RVR likely driven by fever and infection  - continue home eliquis 5mg BID   - increase lopressor to 50bid  - EKG with RBBB/non specific ST/T changes likely in setting of RBBB; no reports of cardiac complaints   - TropT trend of 49->42 not consistent with ACS, likely mild demand from infection  - consider ECHO given elevated proBNP and indeterminate range Trops

## 2022-07-13 NOTE — PROVIDER CONTACT NOTE (OTHER) - ASSESSMENT
HR increased to 160 on tele afib RVR, however patient resting in bed. Denies discomfort. Respiration rate 36 and O2 decreased to 89%, use of abdominal accessory muscles noted.

## 2022-07-13 NOTE — PROGRESS NOTE ADULT - PROBLEM SELECTOR PLAN 1
- Patient found to be covid positive, with first exposure on Wednesday July 6.  - he is Pfizer vaccinated and boosted x 2 - last in April 2022  - symptoms including coughing, sneezing, wheezing, mild tachypnea, fever 102  - wean off 02 today as tolerated, clinically less tachypneic today   - s/p Decadron and duonebs in ED with improvement   - started remdesivir therapy given patient at risk for disease progression  - start dex for respiratory symptoms  - already on anticoagulation with eliquis therapy for Afib   - trend D-dimer and inflammatory markers   - maintain isolation precautions  - tylenol, robitussion, and albuterol HFA PRN for supportive care

## 2022-07-13 NOTE — PROGRESS NOTE ADULT - SUBJECTIVE AND OBJECTIVE BOX
Division of Hospital Medicine  Dr. Mindy Grider  Pager [phone]    Patient is a 89y old  Male who presents with a chief complaint of Increasing confusion, cold like symptoms, covid exposure (2022 08:33)      SUBJECTIVE / OVERNIGHT EVENTS: patient seen and examined.Patient is [place] and unable to respond to .  AAOx1 to self. This morning denies pain, SOB, looks comfortable     MEDICATIONS  (STANDING):  apixaban 5 milliGRAM(s) Oral every 12 hours  dexAMETHasone  Injectable 6 milliGRAM(s) IV Push daily  dextrose 5%. 1000 milliLiter(s) (100 mL/Hr) IV Continuous <Continuous>  dextrose 5%. 1000 milliLiter(s) (50 mL/Hr) IV Continuous <Continuous>  dextrose 50% Injectable 25 Gram(s) IV Push once  dextrose 50% Injectable 12.5 Gram(s) IV Push once  dextrose 50% Injectable 25 Gram(s) IV Push once  donepezil 5 milliGRAM(s) Oral at bedtime  insulin lispro (ADMELOG) corrective regimen sliding scale   SubCutaneous three times a day before meals  insulin lispro (ADMELOG) corrective regimen sliding scale   SubCutaneous at bedtime  losartan 50 milliGRAM(s) Oral daily  melatonin 9 milliGRAM(s) Oral at bedtime  metoprolol tartrate 25 milliGRAM(s) Oral two times a day  remdesivir  IVPB   IV Intermittent   remdesivir  IVPB 100 milliGRAM(s) IV Intermittent every 24 hours  simvastatin 20 milliGRAM(s) Oral at bedtime    MEDICATIONS  (PRN):  acetaminophen     Tablet .. 650 milliGRAM(s) Oral every 6 hours PRN Temp greater or equal to 38C (100.4F), Severe Pain (7 - 10)  ALBUTerol    90 MICROgram(s) HFA Inhaler 2 Puff(s) Inhalation every 6 hours PRN Bronchospasm  dextrose Oral Gel 15 Gram(s) Oral once PRN Blood Glucose LESS THAN 70 milliGRAM(s)/deciliter  guaiFENesin Oral Liquid (Sugar-Free) 100 milliGRAM(s) Oral every 6 hours PRN Cough      CAPILLARY BLOOD GLUCOSE  POCT Blood Glucose.: 332 mg/dL (2022 08:22)  POCT Blood Glucose.: 219 mg/dL (2022 21:17)  POCT Blood Glucose.: 199 mg/dL (2022 17:05)  POCT Blood Glucose.: 200 mg/dL (2022 12:32)        I&O's Summary    Vital Signs Last 24 Hrs  T(C): 37.2 (2022 05:52), Max: 38.3 (2022 11:50)  T(F): 98.9 (2022 05:52), Max: 101 (2022 11:50)  HR: 86 (2022 08:15) (86 - 195)  BP: 160/99 (2022 06:50) (131/86 - 160/99)  BP(mean): --  RR: 19 (2022 05:52) (19 - 40)  SpO2: 98% (2022 05:52) (92% - 98%)    Parameters below as of 2022 05:52  Patient On (Oxygen Delivery Method): nasal cannula  O2 Flow (L/min): 2      PHYSICAL EXAM:  GENERAL: NAD, well-developed, sleeping comfortably   HEAD:  Atraumatic, Normocephalic  EYES: EOMI, PERRLA, conjunctiva and sclera clear  NECK: Supple, No JVD  CHEST/LUNG: Clear to auscultation bilaterally; No wheeze  HEART:+tachy  ABDOMEN: Soft, Nontender, Nondistended; Bowel sounds present  EXTREMITIES:  2+ Peripheral Pulses, No clubbing, cyanosis, or edema  PSYCH: AAOx1  NEUROLOGY: non-focal  SKIN: No rashes or lesions    LABS:                                   11.1   11.07 )-----------( 130      ( 2022 03:00 )             33.9   07-13    x   |  x   |  x   ----------------------------<  x   x    |  x   |  1.09    Ca    8.7      2022 03:00  Phos  2.7     07-13  Mg     2.10     07-13    TPro  6.5  /  Alb  3.4  /  TBili  0.5  /  DBili  <0.2  /  AST  49<H>  /  ALT  23  /  AlkPhos  72  07-13        Urinalysis Basic - ( 2022 01:15 )    Color: Yellow / Appearance: Clear / S.029 / pH: x  Gluc: x / Ketone: Small  / Bili: Negative / Urobili: <2 mg/dL   Blood: x / Protein: 30 mg/dL / Nitrite: Negative   Leuk Esterase: Negative / RBC: 12 /HPF / WBC 3 /HPF   Sq Epi: x / Non Sq Epi: 13 /HPF / Bacteria: Occasional      Respiratory Viral Panel with COVID-19 by CATRACHO (07.10.22 @ 22:22)    Rapid RVP Result: Detected    SARS-CoV-2: Detected: This Respiratory Panel uses polymerase chain reaction (PCR) to detect for  adenovirus; coronavirus (HKU1, NL63, 229E, OC43); human metapneumovirus  (hMPV); human enterovirus/rhinovirus (Entero/RV); influenza A; influenza  A/H1; influenza A/H3; influenza A/H1-2009; influenza B; parainfluenza  viruses 1, 2, 3, 4; respiratory syncytial virus; Mycoplasma pneumoniae;  Chlamydophila pneumoniae; and SARS-CoV-2.    Adenovirus (RapRVP): NotDetec    Influenza A (RapRVP): NotDetec    Influenza B (RapRVP): NotDetec    Parainfluenza 1 (RapRVP): NotDetec    Parainfluenza 2 (RapRVP): NotDetec    Parainfluenza 3 (RapRVP): NotDetec    Parainfluenza 4 (RapRVP): NotDetec    Resp Syncytial Virus (RapRVP): NotDetec    Bordetella pertussis (RapRVP): NotDetec    Bordetella parapertussis (RapRVP): NotDetec    Chlamydia pneumoniae (RapRVP): NotDetec    Mycoplasma pneumoniae (RapRVP): NotDetec    Entero/Rhinovirus (RapRVP): NotDetec    HKU1 Coronavirus (RapRVP): NotDetec    NL63 Coronavirus (RapRVP): NotDetec    229E Coronavirus (RapRVP): NotDetec    OC43 Coronavirus (RapRVP): NotDetec    hMPV (RapRVP): NotDetec      RADIOLOGY & ADDITIONAL TESTS: < from: Xray Chest 1 View- PORTABLE-Urgent (07.10.22 @ 23:49) >  MPRESSION:    No focal consolidation.    < end of copied text >      Imaging Personally Reviewed:    Consultant(s) Notes Reviewed:      Care Discussed with Consultants/Other Providers:    Assessment and Plan:

## 2022-07-13 NOTE — DIETITIAN INITIAL EVALUATION ADULT - OTHER INFO
Per chart review, 89M PMHx dementia (AAO x 2 at baseline), Afib on eliquis, DM2, HTN, hard of hearing who presents with cough, sneezing, and increased confusion at home after Covid exposure Wednesday July 6, found to have Covid 19.    Patient resting comfortably in bed. AxOx0. Collateral via son in law Dr. Zeinab Frost ([phone]), endorses the patient's appetite has gradually decreased PTA. Dr. Arriola reports that pt has lost approximately 20 pounds over the last several months. Food preferences explored via son in law. Family also brings food for patient occasionally. Family amenable to provision of Glucerna 2x daily (440kcals, 20g protein). Dr. Arriola expressed concern about the patient's food texture, reporting that the patient has poor dentition and would like to downgrade to puree texture. Recommend downgrade to puree texture to encourage PO intake. Patient exhibits severe subcutaneous fat loss and muscle mass wasting which place him at sever risk for malnutrition.  Per nursing flowsheet, Patient has minimal intake, consumed 25-50% of his meals.  patient has no GI distress reported at present, fecal incontinence noted per flowsheet. Impaired skin to sacral/gluteal. No edema reported at present. Labs notable with BUN 67H POCT 332H A1C 6.1H, patient is currently on diet and insulin regimen for his glycemic management. Recommend increase fluid intake to remain hydrated.

## 2022-07-13 NOTE — PHYSICAL THERAPY INITIAL EVALUATION ADULT - GENERAL OBSERVATIONS, REHAB EVAL
Pt received semisupine in bed, +nasal cannula, +IV, in NAD. Pt left semisupine in bed, all lines intact and RN aware.

## 2022-07-13 NOTE — PROVIDER CONTACT NOTE (OTHER) - ASSESSMENT
HR increased to 160 on tele afib RVR, however patient resting and asymptomatic, denies discomfort or distress. HR corrected on own ranging from .

## 2022-07-13 NOTE — PHYSICAL THERAPY INITIAL EVALUATION ADULT - PATIENT PROFILE REVIEW, REHAB EVAL
PT evaluate and treat orders received: Out of bed to chair. Consult with RN Omer RODRIGUES, pt may participate in PT evaluation./yes

## 2022-07-13 NOTE — PROVIDER CONTACT NOTE (OTHER) - ASSESSMENT
Patient's temp was 100.5 F and HR was sustaining at 150s, increased to 180 bpm. Patient is restless and agitated.

## 2022-07-13 NOTE — PHYSICAL THERAPY INITIAL EVALUATION ADULT - PREDICTED DURATION OF THERAPY (DAYS/WKS), PT EVAL
Will be placed on trial for 1-2 more sessions to determine if appropriate for skilled PT intervention.

## 2022-07-13 NOTE — DIETITIAN INITIAL EVALUATION ADULT - ADD RECOMMEND
1) Recommend downgrade to puree texture to encourage PO intake.   2) Recommend add Glucerna 2x daily (440kcals, 20g protein)   3) Recommend consider appetite stimulant.   4) Document % of PO intake.   5) Nutrition education is inappropriate at this time given patient is AxOx0.

## 2022-07-13 NOTE — PROVIDER CONTACT NOTE (OTHER) - ASSESSMENT
Rectal temperature 100.4, /80, HR 94, O2 100% on 2L with RR at 26. Patient denies any discomfort at this time

## 2022-07-13 NOTE — CHART NOTE - NSCHARTNOTEFT_GEN_A_CORE
Alerted by RN that patient is  tachycardic in the 160s, tachypneic in the 30s with abdominal breathing, SpO2 at 90% with 2L NC. Patient assessed at bedside. Patient denies chest pain, shortness of breath, abdominal pain or other symptoms.     Rectal temp: 99.4   SpO2 now 100% on 4L NC     Physical Exam:   General Appearance: Appears comfortable laying in bed   Heart: Irregularly irregular. Mild tachycardia  Chest/Lungs: Tachypneic. Decreased breath sound RLL with coarse rales / ronchi   Abdomen: Abdominal accessory muscle use. Abdomen soft  LE: No edema noted     Patient currently on IVF for poor PO intake. TTE EF of 47% and Mild global LV systolic dysfunction. Will stop IVF. Lasix 20 IVP x1. STAT CXR ordered.   Tylenol PRN fever and continue Remdesivir and Decadron for COVID Tx. Increase O2 as needed.     Will reassess. Above discussed with Dr. Grider who is in agreement with plan.

## 2022-07-14 LAB
ALBUMIN SERPL ELPH-MCNC: 3.2 G/DL — LOW (ref 3.3–5)
ALBUMIN SERPL ELPH-MCNC: 3.7 G/DL — SIGNIFICANT CHANGE UP (ref 3.3–5)
ALP SERPL-CCNC: 78 U/L — SIGNIFICANT CHANGE UP (ref 40–120)
ALP SERPL-CCNC: 79 U/L — SIGNIFICANT CHANGE UP (ref 40–120)
ALT FLD-CCNC: 24 U/L — SIGNIFICANT CHANGE UP (ref 4–41)
ALT FLD-CCNC: 25 U/L — SIGNIFICANT CHANGE UP (ref 4–41)
ANION GAP SERPL CALC-SCNC: 13 MMOL/L — SIGNIFICANT CHANGE UP (ref 7–14)
ANION GAP SERPL CALC-SCNC: 13 MMOL/L — SIGNIFICANT CHANGE UP (ref 7–14)
ANISOCYTOSIS BLD QL: SLIGHT — SIGNIFICANT CHANGE UP
AST SERPL-CCNC: 35 U/L — SIGNIFICANT CHANGE UP (ref 4–40)
AST SERPL-CCNC: 47 U/L — HIGH (ref 4–40)
BASOPHILS # BLD AUTO: 0.01 K/UL — SIGNIFICANT CHANGE UP (ref 0–0.2)
BASOPHILS NFR BLD AUTO: 0.1 % — SIGNIFICANT CHANGE UP (ref 0–2)
BILIRUB DIRECT SERPL-MCNC: <0.2 MG/DL — SIGNIFICANT CHANGE UP (ref 0–0.3)
BILIRUB INDIRECT FLD-MCNC: >0.2 MG/DL — SIGNIFICANT CHANGE UP (ref 0–1)
BILIRUB SERPL-MCNC: 0.4 MG/DL — SIGNIFICANT CHANGE UP (ref 0.2–1.2)
BILIRUB SERPL-MCNC: 0.5 MG/DL — SIGNIFICANT CHANGE UP (ref 0.2–1.2)
BUN SERPL-MCNC: 70 MG/DL — HIGH (ref 7–23)
BUN SERPL-MCNC: 71 MG/DL — HIGH (ref 7–23)
BURR CELLS BLD QL SMEAR: PRESENT — SIGNIFICANT CHANGE UP
CALCIUM SERPL-MCNC: 8.8 MG/DL — SIGNIFICANT CHANGE UP (ref 8.4–10.5)
CALCIUM SERPL-MCNC: 8.9 MG/DL — SIGNIFICANT CHANGE UP (ref 8.4–10.5)
CHLORIDE SERPL-SCNC: 111 MMOL/L — HIGH (ref 98–107)
CHLORIDE SERPL-SCNC: 112 MMOL/L — HIGH (ref 98–107)
CO2 SERPL-SCNC: 20 MMOL/L — LOW (ref 22–31)
CO2 SERPL-SCNC: 20 MMOL/L — LOW (ref 22–31)
CREAT SERPL-MCNC: 0.9 MG/DL — SIGNIFICANT CHANGE UP (ref 0.5–1.3)
CREAT SERPL-MCNC: 0.96 MG/DL — SIGNIFICANT CHANGE UP (ref 0.5–1.3)
CREAT SERPL-MCNC: 1.07 MG/DL — SIGNIFICANT CHANGE UP (ref 0.5–1.3)
EGFR: 66 ML/MIN/1.73M2 — SIGNIFICANT CHANGE UP
EGFR: 76 ML/MIN/1.73M2 — SIGNIFICANT CHANGE UP
EGFR: 82 ML/MIN/1.73M2 — SIGNIFICANT CHANGE UP
EOSINOPHIL # BLD AUTO: 0 K/UL — SIGNIFICANT CHANGE UP (ref 0–0.5)
EOSINOPHIL NFR BLD AUTO: 0 % — SIGNIFICANT CHANGE UP (ref 0–6)
GLUCOSE BLDC GLUCOMTR-MCNC: 201 MG/DL — HIGH (ref 70–99)
GLUCOSE BLDC GLUCOMTR-MCNC: 216 MG/DL — HIGH (ref 70–99)
GLUCOSE BLDC GLUCOMTR-MCNC: 332 MG/DL — HIGH (ref 70–99)
GLUCOSE BLDC GLUCOMTR-MCNC: 368 MG/DL — HIGH (ref 70–99)
GLUCOSE SERPL-MCNC: 207 MG/DL — HIGH (ref 70–99)
GLUCOSE SERPL-MCNC: 245 MG/DL — HIGH (ref 70–99)
HCT VFR BLD CALC: 36.1 % — LOW (ref 39–50)
HGB BLD-MCNC: 11.7 G/DL — LOW (ref 13–17)
HYPOCHROMIA BLD QL: SLIGHT — SIGNIFICANT CHANGE UP
IANC: 11.12 K/UL — HIGH (ref 1.8–7.4)
IMM GRANULOCYTES NFR BLD AUTO: 1.1 % — SIGNIFICANT CHANGE UP (ref 0–1.5)
INR BLD: 1.95 RATIO — HIGH (ref 0.88–1.16)
INR BLD: 2.26 RATIO — HIGH (ref 0.88–1.16)
LYMPHOCYTES # BLD AUTO: 0.56 K/UL — LOW (ref 1–3.3)
LYMPHOCYTES # BLD AUTO: 4.6 % — LOW (ref 13–44)
MAGNESIUM SERPL-MCNC: 2.3 MG/DL — SIGNIFICANT CHANGE UP (ref 1.6–2.6)
MANUAL SMEAR VERIFICATION: SIGNIFICANT CHANGE UP
MCHC RBC-ENTMCNC: 32.4 GM/DL — SIGNIFICANT CHANGE UP (ref 32–36)
MCHC RBC-ENTMCNC: 32.4 PG — SIGNIFICANT CHANGE UP (ref 27–34)
MCV RBC AUTO: 100 FL — SIGNIFICANT CHANGE UP (ref 80–100)
MONOCYTES # BLD AUTO: 0.46 K/UL — SIGNIFICANT CHANGE UP (ref 0–0.9)
MONOCYTES NFR BLD AUTO: 3.7 % — SIGNIFICANT CHANGE UP (ref 2–14)
NEUTROPHILS # BLD AUTO: 11.12 K/UL — HIGH (ref 1.8–7.4)
NEUTROPHILS NFR BLD AUTO: 90.5 % — HIGH (ref 43–77)
NRBC # BLD: 0 /100 WBCS — SIGNIFICANT CHANGE UP
NRBC # FLD: 0 K/UL — SIGNIFICANT CHANGE UP
OVALOCYTES BLD QL SMEAR: SLIGHT — SIGNIFICANT CHANGE UP
PHOSPHATE SERPL-MCNC: 2.9 MG/DL — SIGNIFICANT CHANGE UP (ref 2.5–4.5)
PLAT MORPH BLD: SIGNIFICANT CHANGE UP
PLATELET # BLD AUTO: 88 K/UL — LOW (ref 150–400)
PLATELET COUNT - ESTIMATE: ABNORMAL
POIKILOCYTOSIS BLD QL AUTO: SLIGHT — SIGNIFICANT CHANGE UP
POTASSIUM SERPL-MCNC: 4.5 MMOL/L — SIGNIFICANT CHANGE UP (ref 3.5–5.3)
POTASSIUM SERPL-MCNC: 5.8 MMOL/L — HIGH (ref 3.5–5.3)
POTASSIUM SERPL-SCNC: 4.5 MMOL/L — SIGNIFICANT CHANGE UP (ref 3.5–5.3)
POTASSIUM SERPL-SCNC: 5.8 MMOL/L — HIGH (ref 3.5–5.3)
PROT SERPL-MCNC: 6.9 G/DL — SIGNIFICANT CHANGE UP (ref 6–8.3)
PROT SERPL-MCNC: 6.9 G/DL — SIGNIFICANT CHANGE UP (ref 6–8.3)
PROTHROM AB SERPL-ACNC: 22.8 SEC — HIGH (ref 10.5–13.4)
PROTHROM AB SERPL-ACNC: 26.4 SEC — HIGH (ref 10.5–13.4)
RBC # BLD: 3.61 M/UL — LOW (ref 4.2–5.8)
RBC # FLD: 14.6 % — HIGH (ref 10.3–14.5)
RBC BLD AUTO: ABNORMAL
SODIUM SERPL-SCNC: 144 MMOL/L — SIGNIFICANT CHANGE UP (ref 135–145)
SODIUM SERPL-SCNC: 145 MMOL/L — SIGNIFICANT CHANGE UP (ref 135–145)
WBC # BLD: 12.28 K/UL — HIGH (ref 3.8–10.5)
WBC # FLD AUTO: 12.28 K/UL — HIGH (ref 3.8–10.5)

## 2022-07-14 PROCEDURE — 99233 SBSQ HOSP IP/OBS HIGH 50: CPT

## 2022-07-14 RX ORDER — INSULIN LISPRO 100/ML
4 VIAL (ML) SUBCUTANEOUS
Refills: 0 | Status: DISCONTINUED | OUTPATIENT
Start: 2022-07-14 | End: 2022-07-16

## 2022-07-14 RX ORDER — PANTOPRAZOLE SODIUM 20 MG/1
40 TABLET, DELAYED RELEASE ORAL
Refills: 0 | Status: DISCONTINUED | OUTPATIENT
Start: 2022-07-14 | End: 2022-07-28

## 2022-07-14 RX ORDER — INSULIN GLARGINE 100 [IU]/ML
8 INJECTION, SOLUTION SUBCUTANEOUS AT BEDTIME
Refills: 0 | Status: DISCONTINUED | OUTPATIENT
Start: 2022-07-14 | End: 2022-07-16

## 2022-07-14 RX ADMIN — Medication 4 UNIT(S): at 17:53

## 2022-07-14 RX ADMIN — Medication 50 MILLIGRAM(S): at 17:09

## 2022-07-14 RX ADMIN — APIXABAN 5 MILLIGRAM(S): 2.5 TABLET, FILM COATED ORAL at 21:28

## 2022-07-14 RX ADMIN — Medication 50 MILLIGRAM(S): at 05:59

## 2022-07-14 RX ADMIN — Medication 4: at 08:54

## 2022-07-14 RX ADMIN — APIXABAN 5 MILLIGRAM(S): 2.5 TABLET, FILM COATED ORAL at 08:56

## 2022-07-14 RX ADMIN — Medication 6 MILLIGRAM(S): at 05:59

## 2022-07-14 RX ADMIN — REMDESIVIR 500 MILLIGRAM(S): 5 INJECTION INTRAVENOUS at 13:28

## 2022-07-14 RX ADMIN — DONEPEZIL HYDROCHLORIDE 5 MILLIGRAM(S): 10 TABLET, FILM COATED ORAL at 21:28

## 2022-07-14 RX ADMIN — Medication 2 UNIT(S): at 08:55

## 2022-07-14 RX ADMIN — LOSARTAN POTASSIUM 50 MILLIGRAM(S): 100 TABLET, FILM COATED ORAL at 05:59

## 2022-07-14 RX ADMIN — Medication 9 MILLIGRAM(S): at 21:28

## 2022-07-14 RX ADMIN — INSULIN GLARGINE 8 UNIT(S): 100 INJECTION, SOLUTION SUBCUTANEOUS at 21:29

## 2022-07-14 RX ADMIN — SIMVASTATIN 20 MILLIGRAM(S): 20 TABLET, FILM COATED ORAL at 21:28

## 2022-07-14 RX ADMIN — Medication 10: at 12:49

## 2022-07-14 RX ADMIN — Medication 8: at 17:52

## 2022-07-14 RX ADMIN — Medication 2 UNIT(S): at 12:49

## 2022-07-14 NOTE — PROGRESS NOTE ADULT - NSPROGADDITIONALINFOA_GEN_ALL_CORE
Spoke to daughter on 7/13 and updates provided Spoke to daughter on 7/13 and updates provided  left  on 7/14

## 2022-07-14 NOTE — PROGRESS NOTE ADULT - PROBLEM SELECTOR PLAN 3
- afib with RVR likely driven by fever and infection  - continue home eliquis 5mg BID   - increase lopressor to 50bid  - EKG with RBBB/non specific ST/T changes likely in setting of RBBB; no reports of cardiac complaints   - TropT trend of 49->42 not consistent with ACS, likely mild demand from infection  -tte with mild LV systolic dysfunction and stageII diastolic dysfunction

## 2022-07-14 NOTE — PROGRESS NOTE ADULT - SUBJECTIVE AND OBJECTIVE BOX
Division of Hospital Medicine  Dr. Mindy Grider  Pager [phone]    Patient is a 89y old  Male who presents with a chief complaint of Increasing confusion, cold like symptoms, covid exposure (2022 08:33)      SUBJECTIVE / OVERNIGHT EVENTS: patient seen and examined. Patient is [place] and unable to respond to .  AAOx1 to self. This morning denies pain, SOB, looks comfortable     Overnight was febrile, tachycardic and tachypneic and received 20IV lasix     MEDICATIONS  (STANDING):  apixaban 5 milliGRAM(s) Oral every 12 hours  dexAMETHasone  Injectable 6 milliGRAM(s) IV Push daily  dextrose 5%. 1000 milliLiter(s) (100 mL/Hr) IV Continuous <Continuous>  dextrose 5%. 1000 milliLiter(s) (50 mL/Hr) IV Continuous <Continuous>  dextrose 50% Injectable 25 Gram(s) IV Push once  dextrose 50% Injectable 12.5 Gram(s) IV Push once  dextrose 50% Injectable 25 Gram(s) IV Push once  donepezil 5 milliGRAM(s) Oral at bedtime  insulin lispro (ADMELOG) corrective regimen sliding scale   SubCutaneous three times a day before meals  insulin lispro (ADMELOG) corrective regimen sliding scale   SubCutaneous at bedtime  losartan 50 milliGRAM(s) Oral daily  melatonin 9 milliGRAM(s) Oral at bedtime  metoprolol tartrate 25 milliGRAM(s) Oral two times a day  remdesivir  IVPB   IV Intermittent   remdesivir  IVPB 100 milliGRAM(s) IV Intermittent every 24 hours  simvastatin 20 milliGRAM(s) Oral at bedtime    MEDICATIONS  (PRN):  acetaminophen     Tablet .. 650 milliGRAM(s) Oral every 6 hours PRN Temp greater or equal to 38C (100.4F), Severe Pain (7 - 10)  ALBUTerol    90 MICROgram(s) HFA Inhaler 2 Puff(s) Inhalation every 6 hours PRN Bronchospasm  dextrose Oral Gel 15 Gram(s) Oral once PRN Blood Glucose LESS THAN 70 milliGRAM(s)/deciliter  guaiFENesin Oral Liquid (Sugar-Free) 100 milliGRAM(s) Oral every 6 hours PRN Cough    CAPILLARY BLOOD GLUCOSE      POCT Blood Glucose.: 368 mg/dL (2022 12:37)  POCT Blood Glucose.: 216 mg/dL (2022 08:31)  POCT Blood Glucose.: 292 mg/dL (2022 22:18)  POCT Blood Glucose.: 327 mg/dL (2022 18:09)      Vital Signs Last 24 Hrs  T(C): 37.1 (2022 13:10), Max: 37.4 (2022 17:01)  T(F): 98.7 (2022 13:10), Max: 99.4 (2022 17:01)  HR: 98 (2022 13:10) (81 - 160)  BP: 140/76 (2022 13:10) (128/71 - 164/78)  BP(mean): --  RR: 19 (2022 13:10) (19 - 36)  SpO2: 95% (2022 13:10) (89% - 100%)    Parameters below as of 2022 13:10  Patient On (Oxygen Delivery Method): room air  O2 Flow (L/min): 0      PHYSICAL EXAM:  GENERAL: NAD, well-developed, awake comfortable appearing  HEAD:  Atraumatic, Normocephalic  EYES: EOMI, PERRLA, conjunctiva and sclera clear  NECK: Supple, No JVD  CHEST/LUNG: Clear to auscultation bilaterally; No wheeze  HEART:+tachy  ABDOMEN: Soft, Nontender, Nondistended; Bowel sounds present  EXTREMITIES:  2+ Peripheral Pulses, No clubbing, cyanosis, or edema  PSYCH: AAOx1  NEUROLOGY: non-focal  SKIN: No rashes or lesions    LABS:                                   11.7   12.28 )-----------( 88       ( 2022 06:20 )             36.1   07-14    145  |  112<H>  |  71<H>  ----------------------------<  245<H>  4.5   |  20<L>  |  0.96    Ca    8.8      2022 09:00  Phos  2.9     07-14  Mg     2.30     07-14    TPro  6.9  /  Alb  3.2<L>  /  TBili  0.5  /  DBili  x   /  AST  47<H>  /  ALT  25  /  AlkPhos  79  07-14    Urinalysis Basic - ( 2022 01:15 )    Color: Yellow / Appearance: Clear / S.029 / pH: x  Gluc: x / Ketone: Small  / Bili: Negative / Urobili: <2 mg/dL   Blood: x / Protein: 30 mg/dL / Nitrite: Negative   Leuk Esterase: Negative / RBC: 12 /HPF / WBC 3 /HPF   Sq Epi: x / Non Sq Epi: 13 /HPF / Bacteria: Occasional      Respiratory Viral Panel with COVID-19 by CATRACHO (07.10.22 @ 22:22)    Rapid RVP Result: Detected    SARS-CoV-2: Detected: This Respiratory Panel uses polymerase chain reaction (PCR) to detect for  adenovirus; coronavirus (HKU1, NL63, 229E, OC43); human metapneumovirus  (hMPV); human enterovirus/rhinovirus (Entero/RV); influenza A; influenza  A/H1; influenza A/H3; influenza A/H1-2009; influenza B; parainfluenza  viruses 1, 2, 3, 4; respiratory syncytial virus; Mycoplasma pneumoniae;  Chlamydophila pneumoniae; and SARS-CoV-2.    Adenovirus (RapRVP): NotDetec    Influenza A (RapRVP): NotDetec    Influenza B (RapRVP): NotDetec    Parainfluenza 1 (RapRVP): NotDetec    Parainfluenza 2 (RapRVP): NotDetec    Parainfluenza 3 (RapRVP): NotDetec    Parainfluenza 4 (RapRVP): NotDetec    Resp Syncytial Virus (RapRVP): NotDetec    Bordetella pertussis (RapRVP): NotDetec    Bordetella parapertussis (RapRVP): NotDetec    Chlamydia pneumoniae (RapRVP): NotDetec    Mycoplasma pneumoniae (RapRVP): NotDetec    Entero/Rhinovirus (RapRVP): NotDetec    HKU1 Coronavirus (RapRVP): NotDetec    NL63 Coronavirus (RapRVP): NotDetec    229E Coronavirus (RapRVP): NotDetec    OC43 Coronavirus (RapRVP): NotDetec    hMPV (RapRVP): NotDetec      RADIOLOGY & ADDITIONAL TESTS: < from: Xray Chest 1 View- PORTABLE-Urgent (07.10.22 @ 23:49) >  MPRESSION:    No focal consolidation.    < end of copied text >      Imaging Personally Reviewed:    Consultant(s) Notes Reviewed:      Care Discussed with Consultants/Other Providers:    Assessment and Plan:

## 2022-07-14 NOTE — PROGRESS NOTE ADULT - PROBLEM SELECTOR PLAN 4
- Patient takes Glipizide 10mg XL daily at home - hold while admitted  - cover with ISS while admitted   -FS elevated due to steroids  -started on basal/bolus - adjust as needed  - Monitor FS AC/HS

## 2022-07-14 NOTE — PROGRESS NOTE ADULT - PROBLEM SELECTOR PLAN 2
- Patient is AAO x 2 at baseline, now is AAO x 1, appears quite confused   - was trying to get OOB in ED given haldol 2.5mg IV and put on 1:1 for safety   - Neuro exam is non focal  - worsening mental status likely in setting of toxic metabolic encephalopathy in setting of infection/Covid   - patient would benefit from being in the same room as his wife to help re-orient him, at families request; will speak to nursing staff to see if this can be accommodated  - if mental status does not start to improve with treatment and improvement with other symptoms, or acute mental status change or change in neuro exam, consider CT head for further evaluation   - continue aricept 5mg daily

## 2022-07-15 DIAGNOSIS — E87.0 HYPEROSMOLALITY AND HYPERNATREMIA: ICD-10-CM

## 2022-07-15 DIAGNOSIS — R79.9 ABNORMAL FINDING OF BLOOD CHEMISTRY, UNSPECIFIED: ICD-10-CM

## 2022-07-15 LAB
ALBUMIN SERPL ELPH-MCNC: 3.2 G/DL — LOW (ref 3.3–5)
ALP SERPL-CCNC: 75 U/L — SIGNIFICANT CHANGE UP (ref 40–120)
ALT FLD-CCNC: 24 U/L — SIGNIFICANT CHANGE UP (ref 4–41)
ANION GAP SERPL CALC-SCNC: 10 MMOL/L — SIGNIFICANT CHANGE UP (ref 7–14)
AST SERPL-CCNC: 27 U/L — SIGNIFICANT CHANGE UP (ref 4–40)
BASOPHILS # BLD AUTO: 0.03 K/UL — SIGNIFICANT CHANGE UP (ref 0–0.2)
BASOPHILS NFR BLD AUTO: 0.2 % — SIGNIFICANT CHANGE UP (ref 0–2)
BILIRUB SERPL-MCNC: 0.6 MG/DL — SIGNIFICANT CHANGE UP (ref 0.2–1.2)
BUN SERPL-MCNC: 68 MG/DL — HIGH (ref 7–23)
CALCIUM SERPL-MCNC: 9.1 MG/DL — SIGNIFICANT CHANGE UP (ref 8.4–10.5)
CHLORIDE SERPL-SCNC: 114 MMOL/L — HIGH (ref 98–107)
CO2 SERPL-SCNC: 23 MMOL/L — SIGNIFICANT CHANGE UP (ref 22–31)
CREAT SERPL-MCNC: 0.96 MG/DL — SIGNIFICANT CHANGE UP (ref 0.5–1.3)
CRP SERPL-MCNC: 101.7 MG/L — HIGH
D DIMER BLD IA.RAPID-MCNC: 258 NG/ML DDU — HIGH
EGFR: 76 ML/MIN/1.73M2 — SIGNIFICANT CHANGE UP
EOSINOPHIL # BLD AUTO: 0 K/UL — SIGNIFICANT CHANGE UP (ref 0–0.5)
EOSINOPHIL NFR BLD AUTO: 0 % — SIGNIFICANT CHANGE UP (ref 0–6)
FERRITIN SERPL-MCNC: 750 NG/ML — HIGH (ref 30–400)
GLUCOSE BLDC GLUCOMTR-MCNC: 175 MG/DL — HIGH (ref 70–99)
GLUCOSE BLDC GLUCOMTR-MCNC: 213 MG/DL — HIGH (ref 70–99)
GLUCOSE BLDC GLUCOMTR-MCNC: 289 MG/DL — HIGH (ref 70–99)
GLUCOSE BLDC GLUCOMTR-MCNC: 294 MG/DL — HIGH (ref 70–99)
GLUCOSE SERPL-MCNC: 213 MG/DL — HIGH (ref 70–99)
HCT VFR BLD CALC: 35.1 % — LOW (ref 39–50)
HGB BLD-MCNC: 11.5 G/DL — LOW (ref 13–17)
IANC: 11.36 K/UL — HIGH (ref 1.8–7.4)
IMM GRANULOCYTES NFR BLD AUTO: 1.6 % — HIGH (ref 0–1.5)
LDH SERPL L TO P-CCNC: 309 U/L — HIGH (ref 135–225)
LYMPHOCYTES # BLD AUTO: 0.5 K/UL — LOW (ref 1–3.3)
LYMPHOCYTES # BLD AUTO: 4 % — LOW (ref 13–44)
MAGNESIUM SERPL-MCNC: 2.2 MG/DL — SIGNIFICANT CHANGE UP (ref 1.6–2.6)
MCHC RBC-ENTMCNC: 32.5 PG — SIGNIFICANT CHANGE UP (ref 27–34)
MCHC RBC-ENTMCNC: 32.8 GM/DL — SIGNIFICANT CHANGE UP (ref 32–36)
MCV RBC AUTO: 99.2 FL — SIGNIFICANT CHANGE UP (ref 80–100)
MONOCYTES # BLD AUTO: 0.5 K/UL — SIGNIFICANT CHANGE UP (ref 0–0.9)
MONOCYTES NFR BLD AUTO: 4 % — SIGNIFICANT CHANGE UP (ref 2–14)
NEUTROPHILS # BLD AUTO: 11.12 K/UL — HIGH (ref 1.8–7.4)
NEUTROPHILS NFR BLD AUTO: 90.2 % — HIGH (ref 43–77)
NRBC # BLD: 0 /100 WBCS — SIGNIFICANT CHANGE UP
NRBC # FLD: 0 K/UL — SIGNIFICANT CHANGE UP
PHOSPHATE SERPL-MCNC: 2.9 MG/DL — SIGNIFICANT CHANGE UP (ref 2.5–4.5)
PLATELET # BLD AUTO: 166 K/UL — SIGNIFICANT CHANGE UP (ref 150–400)
POTASSIUM SERPL-MCNC: 4.7 MMOL/L — SIGNIFICANT CHANGE UP (ref 3.5–5.3)
POTASSIUM SERPL-SCNC: 4.7 MMOL/L — SIGNIFICANT CHANGE UP (ref 3.5–5.3)
PROT SERPL-MCNC: 6.2 G/DL — SIGNIFICANT CHANGE UP (ref 6–8.3)
RBC # BLD: 3.54 M/UL — LOW (ref 4.2–5.8)
RBC # FLD: 14.4 % — SIGNIFICANT CHANGE UP (ref 10.3–14.5)
SODIUM SERPL-SCNC: 147 MMOL/L — HIGH (ref 135–145)
WBC # BLD: 12.52 K/UL — HIGH (ref 3.8–10.5)
WBC # FLD AUTO: 12.52 K/UL — HIGH (ref 3.8–10.5)

## 2022-07-15 PROCEDURE — 99233 SBSQ HOSP IP/OBS HIGH 50: CPT

## 2022-07-15 PROCEDURE — 93010 ELECTROCARDIOGRAM REPORT: CPT

## 2022-07-15 RX ADMIN — REMDESIVIR 500 MILLIGRAM(S): 5 INJECTION INTRAVENOUS at 12:55

## 2022-07-15 RX ADMIN — LOSARTAN POTASSIUM 50 MILLIGRAM(S): 100 TABLET, FILM COATED ORAL at 05:12

## 2022-07-15 RX ADMIN — Medication 9 MILLIGRAM(S): at 22:04

## 2022-07-15 RX ADMIN — Medication 4 UNIT(S): at 18:07

## 2022-07-15 RX ADMIN — Medication 4 UNIT(S): at 09:15

## 2022-07-15 RX ADMIN — Medication 4 UNIT(S): at 12:55

## 2022-07-15 RX ADMIN — Medication 6: at 12:55

## 2022-07-15 RX ADMIN — Medication 2: at 09:15

## 2022-07-15 RX ADMIN — APIXABAN 5 MILLIGRAM(S): 2.5 TABLET, FILM COATED ORAL at 09:16

## 2022-07-15 RX ADMIN — INSULIN GLARGINE 8 UNIT(S): 100 INJECTION, SOLUTION SUBCUTANEOUS at 22:05

## 2022-07-15 RX ADMIN — Medication 50 MILLIGRAM(S): at 17:39

## 2022-07-15 RX ADMIN — SIMVASTATIN 20 MILLIGRAM(S): 20 TABLET, FILM COATED ORAL at 22:04

## 2022-07-15 RX ADMIN — Medication 6: at 18:07

## 2022-07-15 RX ADMIN — PANTOPRAZOLE SODIUM 40 MILLIGRAM(S): 20 TABLET, DELAYED RELEASE ORAL at 05:12

## 2022-07-15 RX ADMIN — DONEPEZIL HYDROCHLORIDE 5 MILLIGRAM(S): 10 TABLET, FILM COATED ORAL at 22:05

## 2022-07-15 RX ADMIN — APIXABAN 5 MILLIGRAM(S): 2.5 TABLET, FILM COATED ORAL at 22:04

## 2022-07-15 RX ADMIN — Medication 50 MILLIGRAM(S): at 05:12

## 2022-07-15 RX ADMIN — Medication 6 MILLIGRAM(S): at 05:12

## 2022-07-15 NOTE — PROVIDER CONTACT NOTE (OTHER) - ASSESSMENT
On assessment, pt. is asymptomatic. HR increased as patient was having a brief period of restlessness. Patient calmed down, and HR decreased, sustaining between  on tele. Patient unable to state if he is in pain/ experiencing SOB at this time. However, no s/s of pain/ discomfort noted at this time.

## 2022-07-15 NOTE — PROGRESS NOTE ADULT - SUBJECTIVE AND OBJECTIVE BOX
Division of Hospital Medicine  Dr. Mindy Grider  Pager [phone]    Patient is a 89y old  Male who presents with a chief complaint of Increasing confusion, cold like symptoms, covid exposure (2022 08:33)    SUBJECTIVE / OVERNIGHT EVENTS: patient seen and examined. Patient is [place] and unable to respond to .  AAOx1 to self. This morning denies pain, SOB, looks comfortable     Overnight was tachy when agitated but otherwise rate controlled.     MEDICATIONS  (STANDING):  apixaban 5 milliGRAM(s) Oral every 12 hours  dexAMETHasone  Injectable 6 milliGRAM(s) IV Push daily  dextrose 5%. 1000 milliLiter(s) (100 mL/Hr) IV Continuous <Continuous>  dextrose 5%. 1000 milliLiter(s) (50 mL/Hr) IV Continuous <Continuous>  dextrose 50% Injectable 25 Gram(s) IV Push once  dextrose 50% Injectable 12.5 Gram(s) IV Push once  dextrose 50% Injectable 25 Gram(s) IV Push once  donepezil 5 milliGRAM(s) Oral at bedtime  insulin lispro (ADMELOG) corrective regimen sliding scale   SubCutaneous three times a day before meals  insulin lispro (ADMELOG) corrective regimen sliding scale   SubCutaneous at bedtime  losartan 50 milliGRAM(s) Oral daily  melatonin 9 milliGRAM(s) Oral at bedtime  metoprolol tartrate 25 milliGRAM(s) Oral two times a day  remdesivir  IVPB   IV Intermittent   remdesivir  IVPB 100 milliGRAM(s) IV Intermittent every 24 hours  simvastatin 20 milliGRAM(s) Oral at bedtime    MEDICATIONS  (PRN):  acetaminophen     Tablet .. 650 milliGRAM(s) Oral every 6 hours PRN Temp greater or equal to 38C (100.4F), Severe Pain (7 - 10)  ALBUTerol    90 MICROgram(s) HFA Inhaler 2 Puff(s) Inhalation every 6 hours PRN Bronchospasm  dextrose Oral Gel 15 Gram(s) Oral once PRN Blood Glucose LESS THAN 70 milliGRAM(s)/deciliter  guaiFENesin Oral Liquid (Sugar-Free) 100 milliGRAM(s) Oral every 6 hours PRN Cough    CAPILLARY BLOOD GLUCOSE  POCT Blood Glucose.: 175 mg/dL (15 Jul 2022 08:46)  POCT Blood Glucose.: 201 mg/dL (2022 21:22)  POCT Blood Glucose.: 332 mg/dL (2022 17:37)  POCT Blood Glucose.: 368 mg/dL (2022 12:37)    Vital Signs Last 24 Hrs  Vital Signs Last 24 Hrs  T(C): 36.8 (15 Jul 2022 09:10), Max: 37.1 (2022 13:10)  T(F): 98.3 (15 Jul 2022 09:10), Max: 98.8 (15 Jul 2022 01:10)  HR: 96 (15 Jul 2022 09:10) (76 - 158)  BP: 153/78 (15 Jul 2022 09:10) (140/76 - 154/95)  BP(mean): --  RR: 18 (15 Jul 2022 09:10) (18 - 19)  SpO2: 97% (15 Jul 2022 09:10) (95% - 100%)    Parameters below as of 15 Jul 2022 09:10  Patient On (Oxygen Delivery Method): room air      PHYSICAL EXAM:  GENERAL: NAD, well-developed, awake comfortable appearing  HEAD:  Atraumatic, Normocephalic  EYES: EOMI, PERRLA, conjunctiva and sclera clear  NECK: Supple, No JVD  CHEST/LUNG: Clear to auscultation bilaterally; No wheeze  HEART:+tachy  ABDOMEN: Soft, Nontender, Nondistended; Bowel sounds present  EXTREMITIES:  2+ Peripheral Pulses, No clubbing, cyanosis, or edema  PSYCH: AAOx1 to self  NEUROLOGY: non-focal  SKIN: No rashes or lesions    LABS:                          11.5   12.52 )-----------( 166      ( 15 Jul 2022 07:14 )             35.1   07-15    147<H>  |  114<H>  |  68<H>  ----------------------------<  213<H>  4.7   |  23  |  0.96    Ca    9.1      15 Jul 2022 07:14  Phos  2.9     07-15  Mg     2.20     07-15    TPro  6.2  /  Alb  3.2<L>  /  TBili  0.6  /  DBili  x   /  AST  27  /  ALT  24  /  AlkPhos  75  07-15               Urinalysis Basic - ( 2022 01:15 )    Color: Yellow / Appearance: Clear / S.029 / pH: x  Gluc: x / Ketone: Small  / Bili: Negative / Urobili: <2 mg/dL   Blood: x / Protein: 30 mg/dL / Nitrite: Negative   Leuk Esterase: Negative / RBC: 12 /HPF / WBC 3 /HPF   Sq Epi: x / Non Sq Epi: 13 /HPF / Bacteria: Occasional      Respiratory Viral Panel with COVID-19 by CATRACHO (07.10.22 @ 22:22)    Rapid RVP Result: Detected    SARS-CoV-2: Detected: This Respiratory Panel uses polymerase chain reaction (PCR) to detect for  adenovirus; coronavirus (HKU1, NL63, 229E, OC43); human metapneumovirus  (hMPV); human enterovirus/rhinovirus (Entero/RV); influenza A; influenza  A/H1; influenza A/H3; influenza A/H1-2009; influenza B; parainfluenza  viruses 1, 2, 3, 4; respiratory syncytial virus; Mycoplasma pneumoniae;  Chlamydophila pneumoniae; and SARS-CoV-2.    Adenovirus (RapRVP): NotDetec    Influenza A (RapRVP): NotDetec    Influenza B (RapRVP): NotDetec    Parainfluenza 1 (RapRVP): NotDetec    Parainfluenza 2 (RapRVP): NotDetec    Parainfluenza 3 (RapRVP): NotDetec    Parainfluenza 4 (RapRVP): NotDetec    Resp Syncytial Virus (RapRVP): NotDetec    Bordetella pertussis (RapRVP): NotDetec    Bordetella parapertussis (RapRVP): NotDetec    Chlamydia pneumoniae (RapRVP): NotDetec    Mycoplasma pneumoniae (RapRVP): NotDetec    Entero/Rhinovirus (RapRVP): NotDetec    HKU1 Coronavirus (RapRVP): NotDetec    NL63 Coronavirus (RapRVP): NotDetec    229E Coronavirus (RapRVP): NotDetec    OC43 Coronavirus (RapRVP): NotDetec    hMPV (RapRVP): NotDetec      RADIOLOGY & ADDITIONAL TESTS: < from: Xray Chest 1 View- PORTABLE-Urgent (07.10.22 @ 23:49) >  MPRESSION:    No focal consolidation.    < end of copied text >      Imaging Personally Reviewed:    Consultant(s) Notes Reviewed:      Care Discussed with Consultants/Other Providers:    Assessment and Plan:

## 2022-07-15 NOTE — PROGRESS NOTE ADULT - PROBLEM SELECTOR PLAN 4
- Patient takes Glipizide 10mg XL daily at home - hold while admitted  - cover with ISS while admitted   -FS elevated due to steroids  -started on basal/bolus - adjust as needed  - Monitor FS AC/HS  -will need to stop insulin once finishes steroid course

## 2022-07-15 NOTE — PROGRESS NOTE ADULT - PROBLEM SELECTOR PLAN 7
-mild elevation today  -appears euvolemic to dry on exam  -has poor LV and diastolic dysfunction  -would encourage PO hydration today - per RN eats well  -if worsening tomorrow, trial of gentle IVF

## 2022-07-15 NOTE — PROGRESS NOTE ADULT - PROBLEM SELECTOR PLAN 1
- Patient found to be covid positive, with first exposure on Wednesday July 6.  - he is Pfizer vaccinated and boosted x 2 - last in April 2022  - symptoms including coughing, sneezing, wheezing, mild tachypnea, fever 102  - wean off 02 today, on RA this morning. Afebrile now and also less tachypneic   - started remdesivir therapy given patient at risk for disease progression - d3/3 today  - started dex for respiratory symptoms  - already on anticoagulation with eliquis therapy for Afib   - trend D-dimer and inflammatory markers   - maintain isolation precautions  - tylenol, robitussion, and albuterol HFA PRN for supportive care

## 2022-07-15 NOTE — PROGRESS NOTE ADULT - PROBLEM SELECTOR PLAN 3
- afib with RVR likely driven by fever and infection  - continue home eliquis 5mg BID   - increase lopressor to 50bid  -HR better controlled now  - EKG with RBBB/non specific ST/T changes likely in setting of RBBB; no reports of cardiac complaints   - TropT trend of 49->42 not consistent with ACS, likely mild demand from infection  -tte with mild LV systolic dysfunction and stage II diastolic dysfunction

## 2022-07-16 LAB
ANION GAP SERPL CALC-SCNC: 10 MMOL/L — SIGNIFICANT CHANGE UP (ref 7–14)
ANION GAP SERPL CALC-SCNC: 13 MMOL/L — SIGNIFICANT CHANGE UP (ref 7–14)
B-OH-BUTYR SERPL-SCNC: <0 MMOL/L — SIGNIFICANT CHANGE UP (ref 0–0.4)
BASOPHILS # BLD AUTO: 0.01 K/UL — SIGNIFICANT CHANGE UP (ref 0–0.2)
BASOPHILS NFR BLD AUTO: 0.1 % — SIGNIFICANT CHANGE UP (ref 0–2)
BUN SERPL-MCNC: 64 MG/DL — HIGH (ref 7–23)
BUN SERPL-MCNC: 79 MG/DL — HIGH (ref 7–23)
CALCIUM SERPL-MCNC: 8.8 MG/DL — SIGNIFICANT CHANGE UP (ref 8.4–10.5)
CALCIUM SERPL-MCNC: 8.8 MG/DL — SIGNIFICANT CHANGE UP (ref 8.4–10.5)
CHLORIDE SERPL-SCNC: 115 MMOL/L — HIGH (ref 98–107)
CHLORIDE SERPL-SCNC: 116 MMOL/L — HIGH (ref 98–107)
CO2 SERPL-SCNC: 21 MMOL/L — LOW (ref 22–31)
CO2 SERPL-SCNC: 22 MMOL/L — SIGNIFICANT CHANGE UP (ref 22–31)
CREAT SERPL-MCNC: 0.89 MG/DL — SIGNIFICANT CHANGE UP (ref 0.5–1.3)
CREAT SERPL-MCNC: 1.29 MG/DL — SIGNIFICANT CHANGE UP (ref 0.5–1.3)
CULTURE RESULTS: SIGNIFICANT CHANGE UP
CULTURE RESULTS: SIGNIFICANT CHANGE UP
EGFR: 53 ML/MIN/1.73M2 — LOW
EGFR: 82 ML/MIN/1.73M2 — SIGNIFICANT CHANGE UP
EOSINOPHIL # BLD AUTO: 0 K/UL — SIGNIFICANT CHANGE UP (ref 0–0.5)
EOSINOPHIL NFR BLD AUTO: 0 % — SIGNIFICANT CHANGE UP (ref 0–6)
GAS PNL BLDV: SIGNIFICANT CHANGE UP
GLUCOSE BLDC GLUCOMTR-MCNC: 197 MG/DL — HIGH (ref 70–99)
GLUCOSE BLDC GLUCOMTR-MCNC: 296 MG/DL — HIGH (ref 70–99)
GLUCOSE BLDC GLUCOMTR-MCNC: 340 MG/DL — HIGH (ref 70–99)
GLUCOSE BLDC GLUCOMTR-MCNC: 349 MG/DL — HIGH (ref 70–99)
GLUCOSE BLDC GLUCOMTR-MCNC: 402 MG/DL — HIGH (ref 70–99)
GLUCOSE BLDC GLUCOMTR-MCNC: 424 MG/DL — HIGH (ref 70–99)
GLUCOSE SERPL-MCNC: 186 MG/DL — HIGH (ref 70–99)
GLUCOSE SERPL-MCNC: 353 MG/DL — HIGH (ref 70–99)
HCT VFR BLD CALC: 35.2 % — LOW (ref 39–50)
HGB BLD-MCNC: 11 G/DL — LOW (ref 13–17)
IANC: 5.98 K/UL — SIGNIFICANT CHANGE UP (ref 1.8–7.4)
IMM GRANULOCYTES NFR BLD AUTO: 1.4 % — SIGNIFICANT CHANGE UP (ref 0–1.5)
LYMPHOCYTES # BLD AUTO: 0.66 K/UL — LOW (ref 1–3.3)
LYMPHOCYTES # BLD AUTO: 9 % — LOW (ref 13–44)
MAGNESIUM SERPL-MCNC: 2.2 MG/DL — SIGNIFICANT CHANGE UP (ref 1.6–2.6)
MCHC RBC-ENTMCNC: 31.3 GM/DL — LOW (ref 32–36)
MCHC RBC-ENTMCNC: 31.7 PG — SIGNIFICANT CHANGE UP (ref 27–34)
MCV RBC AUTO: 101.4 FL — HIGH (ref 80–100)
MONOCYTES # BLD AUTO: 0.55 K/UL — SIGNIFICANT CHANGE UP (ref 0–0.9)
MONOCYTES NFR BLD AUTO: 7.5 % — SIGNIFICANT CHANGE UP (ref 2–14)
NEUTROPHILS # BLD AUTO: 5.98 K/UL — SIGNIFICANT CHANGE UP (ref 1.8–7.4)
NEUTROPHILS NFR BLD AUTO: 82 % — HIGH (ref 43–77)
NRBC # BLD: 0 /100 WBCS — SIGNIFICANT CHANGE UP
NRBC # FLD: 0 K/UL — SIGNIFICANT CHANGE UP
PHOSPHATE SERPL-MCNC: 3.3 MG/DL — SIGNIFICANT CHANGE UP (ref 2.5–4.5)
PLATELET # BLD AUTO: 161 K/UL — SIGNIFICANT CHANGE UP (ref 150–400)
POTASSIUM SERPL-MCNC: 4.6 MMOL/L — SIGNIFICANT CHANGE UP (ref 3.5–5.3)
POTASSIUM SERPL-MCNC: 4.6 MMOL/L — SIGNIFICANT CHANGE UP (ref 3.5–5.3)
POTASSIUM SERPL-SCNC: 4.6 MMOL/L — SIGNIFICANT CHANGE UP (ref 3.5–5.3)
POTASSIUM SERPL-SCNC: 4.6 MMOL/L — SIGNIFICANT CHANGE UP (ref 3.5–5.3)
RBC # BLD: 3.47 M/UL — LOW (ref 4.2–5.8)
RBC # FLD: 14.5 % — SIGNIFICANT CHANGE UP (ref 10.3–14.5)
SODIUM SERPL-SCNC: 148 MMOL/L — HIGH (ref 135–145)
SODIUM SERPL-SCNC: 149 MMOL/L — HIGH (ref 135–145)
SPECIMEN SOURCE: SIGNIFICANT CHANGE UP
SPECIMEN SOURCE: SIGNIFICANT CHANGE UP
WBC # BLD: 7.3 K/UL — SIGNIFICANT CHANGE UP (ref 3.8–10.5)
WBC # FLD AUTO: 7.3 K/UL — SIGNIFICANT CHANGE UP (ref 3.8–10.5)

## 2022-07-16 PROCEDURE — 99233 SBSQ HOSP IP/OBS HIGH 50: CPT

## 2022-07-16 RX ORDER — SODIUM CHLORIDE 9 MG/ML
500 INJECTION, SOLUTION INTRAVENOUS
Refills: 0 | Status: DISCONTINUED | OUTPATIENT
Start: 2022-07-17 | End: 2022-07-17

## 2022-07-16 RX ORDER — INSULIN GLARGINE 100 [IU]/ML
12 INJECTION, SOLUTION SUBCUTANEOUS AT BEDTIME
Refills: 0 | Status: DISCONTINUED | OUTPATIENT
Start: 2022-07-16 | End: 2022-07-17

## 2022-07-16 RX ORDER — INSULIN LISPRO 100/ML
6 VIAL (ML) SUBCUTANEOUS ONCE
Refills: 0 | Status: COMPLETED | OUTPATIENT
Start: 2022-07-16 | End: 2022-07-16

## 2022-07-16 RX ORDER — SODIUM CHLORIDE 9 MG/ML
1000 INJECTION, SOLUTION INTRAVENOUS
Refills: 0 | Status: DISCONTINUED | OUTPATIENT
Start: 2022-07-16 | End: 2022-07-17

## 2022-07-16 RX ORDER — INSULIN LISPRO 100/ML
6 VIAL (ML) SUBCUTANEOUS
Refills: 0 | Status: DISCONTINUED | OUTPATIENT
Start: 2022-07-16 | End: 2022-07-17

## 2022-07-16 RX ADMIN — Medication 4 UNIT(S): at 09:21

## 2022-07-16 RX ADMIN — DONEPEZIL HYDROCHLORIDE 5 MILLIGRAM(S): 10 TABLET, FILM COATED ORAL at 22:15

## 2022-07-16 RX ADMIN — Medication 2: at 09:21

## 2022-07-16 RX ADMIN — Medication 650 MILLIGRAM(S): at 17:36

## 2022-07-16 RX ADMIN — Medication 6 UNIT(S): at 13:00

## 2022-07-16 RX ADMIN — Medication 9 MILLIGRAM(S): at 22:15

## 2022-07-16 RX ADMIN — APIXABAN 5 MILLIGRAM(S): 2.5 TABLET, FILM COATED ORAL at 09:21

## 2022-07-16 RX ADMIN — INSULIN GLARGINE 12 UNIT(S): 100 INJECTION, SOLUTION SUBCUTANEOUS at 22:12

## 2022-07-16 RX ADMIN — Medication 12: at 13:00

## 2022-07-16 RX ADMIN — Medication 650 MILLIGRAM(S): at 16:36

## 2022-07-16 RX ADMIN — Medication 4: at 22:13

## 2022-07-16 RX ADMIN — SODIUM CHLORIDE 75 MILLILITER(S): 9 INJECTION, SOLUTION INTRAVENOUS at 14:45

## 2022-07-16 RX ADMIN — Medication 50 MILLIGRAM(S): at 05:31

## 2022-07-16 RX ADMIN — APIXABAN 5 MILLIGRAM(S): 2.5 TABLET, FILM COATED ORAL at 22:15

## 2022-07-16 RX ADMIN — Medication 50 MILLIGRAM(S): at 17:31

## 2022-07-16 RX ADMIN — SIMVASTATIN 20 MILLIGRAM(S): 20 TABLET, FILM COATED ORAL at 22:15

## 2022-07-16 RX ADMIN — LOSARTAN POTASSIUM 50 MILLIGRAM(S): 100 TABLET, FILM COATED ORAL at 05:31

## 2022-07-16 RX ADMIN — Medication 6 MILLIGRAM(S): at 05:31

## 2022-07-16 RX ADMIN — PANTOPRAZOLE SODIUM 40 MILLIGRAM(S): 20 TABLET, DELAYED RELEASE ORAL at 05:31

## 2022-07-16 NOTE — CHART NOTE - NSCHARTNOTEFT_GEN_A_CORE
notified by RN that FS was 402  pt given sliding scale and ordered premeal admelog for a total of 18 units.  per RN pt ate 30-40% of lunch. repeat FS one hour later was 424  pt see and examined at bedside. exam notable for increased WOB, RR ~35 breaths/min and HR 120s   will start LR. ordered rpt BMP, VBG, and FS in 30-45 minutes  case discussed with Dr. Terrazas

## 2022-07-16 NOTE — PROGRESS NOTE ADULT - PROBLEM SELECTOR PLAN 2
- With worsening mental status in the setting of acute infection.  - If mental status does not start to improve with treatment and improvement with other symptoms, or acute mental status change or change in neuro exam, consider CT head for further evaluation.  - Continue donepezil.  - Has been on haloperidol PRN.

## 2022-07-16 NOTE — PROGRESS NOTE ADULT - ASSESSMENT
89M h/o dementia (A&Ox2 at baseline, A-fib (apixaban), DM2, HTN, [place] admitted 7/11 with cough / sneezing / increased confusion after SARS-CoV-2 exposure found to have COVID-19. Course c/b intermittent tachycardia.

## 2022-07-16 NOTE — PROGRESS NOTE ADULT - PROBLEM SELECTOR PLAN 3
- A-fib with RBR in the setting of infection.  - Continue home apixaban.  - Continue metoprolol - increased to 50mg BID.  - On PRN IV metoprolol for sustained breakthrough A-fib with RVR.  - TropT trend of 49->42 not consistent with ACS, likely mild demand from infection.  - TTE with mild LV systolic dysfunction and stage II diastolic dysfunction.

## 2022-07-16 NOTE — PROGRESS NOTE ADULT - PROBLEM SELECTOR PLAN 8
DVT prophylaxis: therapeutic apixaban.  Diet: CC / DASH/TLC.  Dispo: pending [facility], likely Monday. DVT prophylaxis: therapeutic apixaban.  Diet: CC / DASH/TLC.  Dispo: pending [facility], likely Monday.  GOC: DNR/DNI.

## 2022-07-16 NOTE — PROVIDER CONTACT NOTE (OTHER) - ASSESSMENT
On assessment, pt. is asymptomatic, observed sleeping in bed. Patient noted with HR in the 130's on tele, nonsustaining. HR continued to fluctuate between 120-130's for a brief period. VSS as per flowsheet. HR now 85's - 105 on tele.

## 2022-07-16 NOTE — PROGRESS NOTE ADULT - PROBLEM SELECTOR PLAN 4
- Continue standing insulin while patient remains hyperglycemic in the setting of steroids.  - Plan to discontinue standing insulin after completion of steroids.  - ISS.  - FS qAC/HS.  - Hold oral glycemic control agents.

## 2022-07-16 NOTE — PROGRESS NOTE ADULT - SUBJECTIVE AND OBJECTIVE BOX
Anne Terrazas MD  Attending Physician | Internal Medicine  [phone] / [phone]    Patient is a 89y old  Male who presents with a chief complaint of Increasing confusion, cold like symptoms, covid exposure (15 Jul 2022 12:09)      SUBJECTIVE / OVERNIGHT EVENTS: Afebrile, non-sustained tachycardia up to 158 overnight. WBC WNL.    MEDICATIONS  (STANDING):  apixaban 5 milliGRAM(s) Oral every 12 hours  dexAMETHasone  Injectable 6 milliGRAM(s) IV Push daily  dextrose 5%. 1000 milliLiter(s) (100 mL/Hr) IV Continuous <Continuous>  dextrose 5%. 1000 milliLiter(s) (50 mL/Hr) IV Continuous <Continuous>  dextrose 50% Injectable 25 Gram(s) IV Push once  dextrose 50% Injectable 12.5 Gram(s) IV Push once  dextrose 50% Injectable 25 Gram(s) IV Push once  donepezil 5 milliGRAM(s) Oral at bedtime  glucagon  Injectable 1 milliGRAM(s) IntraMuscular once  insulin glargine Injectable (LANTUS) 8 Unit(s) SubCutaneous at bedtime  insulin lispro (ADMELOG) corrective regimen sliding scale   SubCutaneous three times a day before meals  insulin lispro (ADMELOG) corrective regimen sliding scale   SubCutaneous at bedtime  insulin lispro Injectable (ADMELOG) 4 Unit(s) SubCutaneous three times a day before meals  losartan 50 milliGRAM(s) Oral daily  melatonin 9 milliGRAM(s) Oral at bedtime  metoprolol tartrate 50 milliGRAM(s) Oral two times a day  pantoprazole    Tablet 40 milliGRAM(s) Oral before breakfast  simvastatin 20 milliGRAM(s) Oral at bedtime    MEDICATIONS  (PRN):  acetaminophen     Tablet .. 650 milliGRAM(s) Oral every 4 hours PRN Temp greater or equal to 38C (100.4F), Severe Pain (7 - 10)  ALBUTerol    90 MICROgram(s) HFA Inhaler 2 Puff(s) Inhalation every 6 hours PRN Bronchospasm  dextrose Oral Gel 15 Gram(s) Oral once PRN Blood Glucose LESS THAN 70 milliGRAM(s)/deciliter  guaiFENesin Oral Liquid (Sugar-Free) 100 milliGRAM(s) Oral every 6 hours PRN Cough  metoprolol tartrate Injectable 5 milliGRAM(s) IV Push every 6 hours PRN sustained HR over 140 bpm      T(C): 36.6 (07-16-22 @ 05:19), Max: 36.9 (07-15-22 @ 13:10)  HR: 71 (07-16-22 @ 05:19) (71 - 104)  BP: 143/61 (07-16-22 @ 05:19) (136/74 - 165/90)  RR: 18 (07-16-22 @ 05:19) (18 - 18)  SpO2: 100% (07-16-22 @ 05:19) (97% - 100%)    CAPILLARY BLOOD GLUCOSE      POCT Blood Glucose.: 213 mg/dL (15 Jul 2022 21:48)  POCT Blood Glucose.: 289 mg/dL (15 Jul 2022 17:20)  POCT Blood Glucose.: 294 mg/dL (15 Jul 2022 12:48)  POCT Blood Glucose.: 175 mg/dL (15 Jul 2022 08:46)    I&O's Summary    15 Jul 2022 07:01  -  16 Jul 2022 07:00  --------------------------------------------------------  IN: 950 mL / OUT: 0 mL / NET: 950 mL        PHYSICAL EXAM:  GENERAL:  HEAD:  EYES:  NECK:  CHEST/LUNG:  HEART:  ABDOMEN:  EXTREMITIES:  PSYCH:  NEUROLOGY:  SKIN:    LABS:                        11.0   7.30  )-----------( 161      ( 16 Jul 2022 04:36 )             35.2     07-16    148<H>  |  116<H>  |  64<H>  ----------------------------<  186<H>  4.6   |  22  |  0.89    Ca    8.8      16 Jul 2022 04:36  Phos  3.3     07-16  Mg     2.20     07-16    TPro  6.2  /  Alb  3.2<L>  /  TBili  0.6  /  DBili  x   /  AST  27  /  ALT  24  /  AlkPhos  75  07-15              RADIOLOGY & ADDITIONAL TESTS:    Imaging Personally Reviewed: no new imaging    Consultant(s) Notes Reviewed: no new consultant notes at this time    Care Discussed with Consultants/Other Providers:  Anne Terrazas MD  Attending Physician | Internal Medicine  [phone] / [phone]    Patient is a 89y old  Male who presents with a chief complaint of Increasing confusion, cold like symptoms, covid exposure (15 Jul 2022 12:09)      SUBJECTIVE / OVERNIGHT EVENTS: Afebrile, non-sustained tachycardia up to 158 overnight. WBC WNL. Patient not interactive.    MEDICATIONS  (STANDING):  apixaban 5 milliGRAM(s) Oral every 12 hours  dexAMETHasone  Injectable 6 milliGRAM(s) IV Push daily  dextrose 5%. 1000 milliLiter(s) (100 mL/Hr) IV Continuous <Continuous>  dextrose 5%. 1000 milliLiter(s) (50 mL/Hr) IV Continuous <Continuous>  dextrose 50% Injectable 25 Gram(s) IV Push once  dextrose 50% Injectable 12.5 Gram(s) IV Push once  dextrose 50% Injectable 25 Gram(s) IV Push once  donepezil 5 milliGRAM(s) Oral at bedtime  glucagon  Injectable 1 milliGRAM(s) IntraMuscular once  insulin glargine Injectable (LANTUS) 8 Unit(s) SubCutaneous at bedtime  insulin lispro (ADMELOG) corrective regimen sliding scale   SubCutaneous three times a day before meals  insulin lispro (ADMELOG) corrective regimen sliding scale   SubCutaneous at bedtime  insulin lispro Injectable (ADMELOG) 4 Unit(s) SubCutaneous three times a day before meals  losartan 50 milliGRAM(s) Oral daily  melatonin 9 milliGRAM(s) Oral at bedtime  metoprolol tartrate 50 milliGRAM(s) Oral two times a day  pantoprazole    Tablet 40 milliGRAM(s) Oral before breakfast  simvastatin 20 milliGRAM(s) Oral at bedtime    MEDICATIONS  (PRN):  acetaminophen     Tablet .. 650 milliGRAM(s) Oral every 4 hours PRN Temp greater or equal to 38C (100.4F), Severe Pain (7 - 10)  ALBUTerol    90 MICROgram(s) HFA Inhaler 2 Puff(s) Inhalation every 6 hours PRN Bronchospasm  dextrose Oral Gel 15 Gram(s) Oral once PRN Blood Glucose LESS THAN 70 milliGRAM(s)/deciliter  guaiFENesin Oral Liquid (Sugar-Free) 100 milliGRAM(s) Oral every 6 hours PRN Cough  metoprolol tartrate Injectable 5 milliGRAM(s) IV Push every 6 hours PRN sustained HR over 140 bpm      T(C): 36.6 (07-16-22 @ 05:19), Max: 36.9 (07-15-22 @ 13:10)  HR: 71 (07-16-22 @ 05:19) (71 - 104)  BP: 143/61 (07-16-22 @ 05:19) (136/74 - 165/90)  RR: 18 (07-16-22 @ 05:19) (18 - 18)  SpO2: 100% (07-16-22 @ 05:19) (97% - 100%)    CAPILLARY BLOOD GLUCOSE      POCT Blood Glucose.: 213 mg/dL (15 Jul 2022 21:48)  POCT Blood Glucose.: 289 mg/dL (15 Jul 2022 17:20)  POCT Blood Glucose.: 294 mg/dL (15 Jul 2022 12:48)  POCT Blood Glucose.: 175 mg/dL (15 Jul 2022 08:46)    I&O's Summary    15 Jul 2022 07:01  -  16 Jul 2022 07:00  --------------------------------------------------------  IN: 950 mL / OUT: 0 mL / NET: 950 mL    T(C): 36.6 (07-16-22 @ 11:15), Max: 36.9 (07-15-22 @ 13:10)  HR: 98 (07-16-22 @ 11:15) (71 - 104)  BP: 146/72 (07-16-22 @ 11:15) (136/74 - 165/90)  RR: 18 (07-16-22 @ 11:15) (18 - 18)  SpO2: 100% (07-16-22 @ 11:15) (99% - 100%)    Gen: awake, alert  HENT: neck soft / supple; MM slightly dry  Lymph: no LAD noted in neck  Eye: sclerae anicteric  CV: normal rate, regular rhythm  Pulm: CTAB anteriorly  Abd: +BS, soft, NT, ND  Skin: warm, dry  Ext: no LE edema  Neuro: not interactive, not verbalizing  Psych: unable to assess    LABS:                        11.0   7.30  )-----------( 161      ( 16 Jul 2022 04:36 )             35.2     07-16    148<H>  |  116<H>  |  64<H>  ----------------------------<  186<H>  4.6   |  22  |  0.89    Ca    8.8      16 Jul 2022 04:36  Phos  3.3     07-16  Mg     2.20     07-16    TPro  6.2  /  Alb  3.2<L>  /  TBili  0.6  /  DBili  x   /  AST  27  /  ALT  24  /  AlkPhos  75  07-15              RADIOLOGY & ADDITIONAL TESTS:    Imaging Personally Reviewed: no new imaging    Consultant(s) Notes Reviewed: no new consultant notes at this time    Care Discussed with Consultants/Other Providers:

## 2022-07-16 NOTE — PROGRESS NOTE ADULT - PROBLEM SELECTOR PLAN 1
- Exposed 7/6/2022.  - Vaccinated and boosted x2 (Pfizer, LD 04/2022).  - Afebrile, on RA.  - Completed remdesivir.  - Continues on dexamethasone through 7/20/2022.  - Continue to trend D-dimer, CRP.  - Maintain isolation precautions as per hospital protocol.  - Supportive CAre.

## 2022-07-16 NOTE — PROGRESS NOTE ADULT - PROBLEM SELECTOR PLAN 7
- Slightly increased today.  - Encourage PO hydration.  - If continues to worsen, would trial with gentle IVF in the setting of poor LV systolic and diastolic dysfunction.

## 2022-07-17 DIAGNOSIS — R13.10 DYSPHAGIA, UNSPECIFIED: ICD-10-CM

## 2022-07-17 LAB
ALBUMIN SERPL ELPH-MCNC: 3.1 G/DL — LOW (ref 3.3–5)
ALP SERPL-CCNC: 82 U/L — SIGNIFICANT CHANGE UP (ref 40–120)
ALT FLD-CCNC: 28 U/L — SIGNIFICANT CHANGE UP (ref 4–41)
ANION GAP SERPL CALC-SCNC: 11 MMOL/L — SIGNIFICANT CHANGE UP (ref 7–14)
AST SERPL-CCNC: 24 U/L — SIGNIFICANT CHANGE UP (ref 4–40)
BASOPHILS # BLD AUTO: 0.04 K/UL — SIGNIFICANT CHANGE UP (ref 0–0.2)
BASOPHILS NFR BLD AUTO: 0.4 % — SIGNIFICANT CHANGE UP (ref 0–2)
BILIRUB DIRECT SERPL-MCNC: 0.2 MG/DL — SIGNIFICANT CHANGE UP (ref 0–0.3)
BILIRUB INDIRECT FLD-MCNC: 0.6 MG/DL — SIGNIFICANT CHANGE UP (ref 0–1)
BILIRUB SERPL-MCNC: 0.8 MG/DL — SIGNIFICANT CHANGE UP (ref 0.2–1.2)
BUN SERPL-MCNC: 64 MG/DL — HIGH (ref 7–23)
CALCIUM SERPL-MCNC: 8.9 MG/DL — SIGNIFICANT CHANGE UP (ref 8.4–10.5)
CHLORIDE SERPL-SCNC: 113 MMOL/L — HIGH (ref 98–107)
CO2 SERPL-SCNC: 24 MMOL/L — SIGNIFICANT CHANGE UP (ref 22–31)
CREAT SERPL-MCNC: 1.01 MG/DL — SIGNIFICANT CHANGE UP (ref 0.5–1.3)
CREAT SERPL-MCNC: 1.03 MG/DL — SIGNIFICANT CHANGE UP (ref 0.5–1.3)
CRP SERPL-MCNC: 63.7 MG/L — HIGH
EGFR: 69 ML/MIN/1.73M2 — SIGNIFICANT CHANGE UP
EGFR: 71 ML/MIN/1.73M2 — SIGNIFICANT CHANGE UP
EOSINOPHIL # BLD AUTO: 0 K/UL — SIGNIFICANT CHANGE UP (ref 0–0.5)
EOSINOPHIL NFR BLD AUTO: 0 % — SIGNIFICANT CHANGE UP (ref 0–6)
GLUCOSE BLDC GLUCOMTR-MCNC: 172 MG/DL — HIGH (ref 70–99)
GLUCOSE BLDC GLUCOMTR-MCNC: 180 MG/DL — HIGH (ref 70–99)
GLUCOSE BLDC GLUCOMTR-MCNC: 205 MG/DL — HIGH (ref 70–99)
GLUCOSE BLDC GLUCOMTR-MCNC: 207 MG/DL — HIGH (ref 70–99)
GLUCOSE BLDC GLUCOMTR-MCNC: 213 MG/DL — HIGH (ref 70–99)
GLUCOSE BLDC GLUCOMTR-MCNC: 216 MG/DL — HIGH (ref 70–99)
GLUCOSE BLDC GLUCOMTR-MCNC: 234 MG/DL — HIGH (ref 70–99)
GLUCOSE SERPL-MCNC: 246 MG/DL — HIGH (ref 70–99)
HCT VFR BLD CALC: 37.1 % — LOW (ref 39–50)
HGB BLD-MCNC: 11.4 G/DL — LOW (ref 13–17)
IANC: 9.16 K/UL — HIGH (ref 1.8–7.4)
IMM GRANULOCYTES NFR BLD AUTO: 2 % — HIGH (ref 0–1.5)
INR BLD: 1.8 RATIO — HIGH (ref 0.88–1.16)
LACTATE SERPL-SCNC: 2.5 MMOL/L — HIGH (ref 0.5–2)
LYMPHOCYTES # BLD AUTO: 0.61 K/UL — LOW (ref 1–3.3)
LYMPHOCYTES # BLD AUTO: 5.7 % — LOW (ref 13–44)
MAGNESIUM SERPL-MCNC: 2.2 MG/DL — SIGNIFICANT CHANGE UP (ref 1.6–2.6)
MCHC RBC-ENTMCNC: 30.7 GM/DL — LOW (ref 32–36)
MCHC RBC-ENTMCNC: 31.1 PG — SIGNIFICANT CHANGE UP (ref 27–34)
MCV RBC AUTO: 101.4 FL — HIGH (ref 80–100)
MONOCYTES # BLD AUTO: 0.61 K/UL — SIGNIFICANT CHANGE UP (ref 0–0.9)
MONOCYTES NFR BLD AUTO: 5.7 % — SIGNIFICANT CHANGE UP (ref 2–14)
NEUTROPHILS # BLD AUTO: 9.16 K/UL — HIGH (ref 1.8–7.4)
NEUTROPHILS NFR BLD AUTO: 86.2 % — HIGH (ref 43–77)
NRBC # BLD: 0 /100 WBCS — SIGNIFICANT CHANGE UP
NRBC # FLD: 0 K/UL — SIGNIFICANT CHANGE UP
PHOSPHATE SERPL-MCNC: 3.6 MG/DL — SIGNIFICANT CHANGE UP (ref 2.5–4.5)
PLATELET # BLD AUTO: 193 K/UL — SIGNIFICANT CHANGE UP (ref 150–400)
POTASSIUM SERPL-MCNC: 4.7 MMOL/L — SIGNIFICANT CHANGE UP (ref 3.5–5.3)
POTASSIUM SERPL-SCNC: 4.7 MMOL/L — SIGNIFICANT CHANGE UP (ref 3.5–5.3)
PROT SERPL-MCNC: 6.3 G/DL — SIGNIFICANT CHANGE UP (ref 6–8.3)
PROTHROM AB SERPL-ACNC: 21 SEC — HIGH (ref 10.5–13.4)
RBC # BLD: 3.66 M/UL — LOW (ref 4.2–5.8)
RBC # FLD: 14.6 % — HIGH (ref 10.3–14.5)
SARS-COV-2 RNA SPEC QL NAA+PROBE: DETECTED
SODIUM SERPL-SCNC: 148 MMOL/L — HIGH (ref 135–145)
WBC # BLD: 10.63 K/UL — HIGH (ref 3.8–10.5)
WBC # FLD AUTO: 10.63 K/UL — HIGH (ref 3.8–10.5)

## 2022-07-17 PROCEDURE — 99233 SBSQ HOSP IP/OBS HIGH 50: CPT

## 2022-07-17 RX ORDER — SODIUM CHLORIDE 9 MG/ML
1000 INJECTION, SOLUTION INTRAVENOUS
Refills: 0 | Status: DISCONTINUED | OUTPATIENT
Start: 2022-07-17 | End: 2022-08-06

## 2022-07-17 RX ORDER — DIPHENHYDRAMINE HCL 50 MG
12.5 CAPSULE ORAL ONCE
Refills: 0 | Status: COMPLETED | OUTPATIENT
Start: 2022-07-17 | End: 2022-07-17

## 2022-07-17 RX ORDER — DEXTROSE 50 % IN WATER 50 %
25 SYRINGE (ML) INTRAVENOUS ONCE
Refills: 0 | Status: DISCONTINUED | OUTPATIENT
Start: 2022-07-17 | End: 2022-08-06

## 2022-07-17 RX ORDER — DEXTROSE 50 % IN WATER 50 %
15 SYRINGE (ML) INTRAVENOUS ONCE
Refills: 0 | Status: DISCONTINUED | OUTPATIENT
Start: 2022-07-17 | End: 2022-08-06

## 2022-07-17 RX ORDER — DEXTROSE 50 % IN WATER 50 %
12.5 SYRINGE (ML) INTRAVENOUS ONCE
Refills: 0 | Status: DISCONTINUED | OUTPATIENT
Start: 2022-07-17 | End: 2022-08-06

## 2022-07-17 RX ORDER — INSULIN LISPRO 100/ML
VIAL (ML) SUBCUTANEOUS EVERY 6 HOURS
Refills: 0 | Status: DISCONTINUED | OUTPATIENT
Start: 2022-07-17 | End: 2022-07-20

## 2022-07-17 RX ORDER — GLUCAGON INJECTION, SOLUTION 0.5 MG/.1ML
1 INJECTION, SOLUTION SUBCUTANEOUS ONCE
Refills: 0 | Status: DISCONTINUED | OUTPATIENT
Start: 2022-07-17 | End: 2022-08-06

## 2022-07-17 RX ORDER — INSULIN LISPRO 100/ML
VIAL (ML) SUBCUTANEOUS
Refills: 0 | Status: DISCONTINUED | OUTPATIENT
Start: 2022-07-17 | End: 2022-07-17

## 2022-07-17 RX ORDER — INSULIN LISPRO 100/ML
VIAL (ML) SUBCUTANEOUS EVERY 6 HOURS
Refills: 0 | Status: DISCONTINUED | OUTPATIENT
Start: 2022-07-17 | End: 2022-07-17

## 2022-07-17 RX ADMIN — Medication 2: at 13:01

## 2022-07-17 RX ADMIN — PANTOPRAZOLE SODIUM 40 MILLIGRAM(S): 20 TABLET, DELAYED RELEASE ORAL at 06:03

## 2022-07-17 RX ADMIN — Medication 12.5 MILLIGRAM(S): at 21:44

## 2022-07-17 RX ADMIN — Medication 6 MILLIGRAM(S): at 06:04

## 2022-07-17 RX ADMIN — LOSARTAN POTASSIUM 50 MILLIGRAM(S): 100 TABLET, FILM COATED ORAL at 06:04

## 2022-07-17 RX ADMIN — Medication 50 MILLIGRAM(S): at 06:04

## 2022-07-17 RX ADMIN — APIXABAN 5 MILLIGRAM(S): 2.5 TABLET, FILM COATED ORAL at 09:09

## 2022-07-17 RX ADMIN — Medication 4: at 09:09

## 2022-07-17 RX ADMIN — Medication 5 MILLIGRAM(S): at 23:13

## 2022-07-17 RX ADMIN — Medication 2: at 18:18

## 2022-07-17 NOTE — PROVIDER CONTACT NOTE (OTHER) - ASSESSMENT
Patient spills water from mouth and coughs frequently after taking sip of water. Patient failed dysphagia screen

## 2022-07-17 NOTE — PROGRESS NOTE ADULT - TIME BILLING
Chart review, patient interview and examination, patient counseling, care coordination. This patient was unknown to me previously and was assigned to me by the day or night hospitalist in charge.
Chart review, patient interview and examination, patient counseling, care coordination.

## 2022-07-17 NOTE — PROGRESS NOTE ADULT - PROBLEM SELECTOR PLAN 8
DVT prophylaxis: therapeutic apixaban.  Diet: CC / DASH/TLC.  Dispo: pending medical stability, to [facility].  GOC: DNR/DNI. - Without TENZIN.  - Suspect from steroids.  - Continue to monitor.

## 2022-07-17 NOTE — PROGRESS NOTE ADULT - PROBLEM SELECTOR PLAN 2
- Exposed 7/6/2022.  - Vaccinated and boosted x2 (Pfizer, LD 04/2022).  - Afebrile, on RA.  - Completed remdesivir.  - Continues on dexamethasone through 7/20/2022.  - Continue to trend D-dimer, CRP.  - Maintain isolation precautions as per hospital protocol.  - Supportive Care. - Failed dysphagia screen today.  - Pending formal SLP evaluation.

## 2022-07-17 NOTE — PROGRESS NOTE ADULT - PROBLEM SELECTOR PLAN 9
DVT prophylaxis: therapeutic apixaban.  Diet: CC / DASH/TLC.  Dispo: pending SLP evaluation, to [facility].  GOC: DNR/DNI.

## 2022-07-17 NOTE — PROVIDER CONTACT NOTE (OTHER) - ASSESSMENT
On assessment, pt. is asymptomatic, observed sleeping in bed. No bladder distention noted, no s/s of pain noted or voiced.

## 2022-07-17 NOTE — PROGRESS NOTE ADULT - PROBLEM SELECTOR PLAN 5
- Continue standing insulin while patient remains hyperglycemic in the setting of steroids.  - Plan to discontinue standing insulin after completion of steroids.  - ISS.  - FS qAC/HS.  - Hold oral glycemic control agents. - A-fib with intermittent RVR.  - Continue home apixaban.  - Continue metoprolol - increased to 50mg BID.  - On PRN IV metoprolol for sustained breakthrough A-fib with RVR.  - TropT trend of 49->42 not consistent with ACS, likely mild demand from infection.  - TTE with mild LV systolic dysfunction and stage II diastolic dysfunction.

## 2022-07-17 NOTE — PROGRESS NOTE ADULT - PROBLEM SELECTOR PLAN 1
- Remains increased today, relatively stable.  - Encourage PO hydration.  - If continues to worsen / inadequate PO intake, would trial with gentle IVF in the setting of poor LV systolic and diastolic dysfunction.

## 2022-07-17 NOTE — PROGRESS NOTE ADULT - PROBLEM SELECTOR PLAN 6
- Continue losartan. - Continue standing insulin while patient remains hyperglycemic in the setting of steroids.  - Plan to discontinue standing insulin after completion of steroids.  - ISS.  - FS qAC/HS.  - Hold oral glycemic control agents.

## 2022-07-17 NOTE — PROGRESS NOTE ADULT - SUBJECTIVE AND OBJECTIVE BOX
Anne Terrazas MD  Attending Physician | Internal Medicine  [phone] / [phone]    Patient is a 89y old  Male who presents with a chief complaint of Increasing confusion, cold like symptoms, covid exposure (16 Jul 2022 07:17)      SUBJECTIVE / OVERNIGHT EVENTS: Afebrile, WBC 10.63. The patient was noted to be hyperglycemic to the 400s yesterday, negative BHB, AG 12, made NPO, given extra units of admelog, with improvement this AM to the 200s. The patient was noted to have a non-sustained tachycardia to 150 in the afternoon.    MEDICATIONS  (STANDING):  apixaban 5 milliGRAM(s) Oral every 12 hours  dexAMETHasone  Injectable 6 milliGRAM(s) IV Push daily  dextrose 5%. 1000 milliLiter(s) (100 mL/Hr) IV Continuous <Continuous>  dextrose 5%. 1000 milliLiter(s) (50 mL/Hr) IV Continuous <Continuous>  dextrose 50% Injectable 25 Gram(s) IV Push once  dextrose 50% Injectable 12.5 Gram(s) IV Push once  dextrose 50% Injectable 25 Gram(s) IV Push once  donepezil 5 milliGRAM(s) Oral at bedtime  glucagon  Injectable 1 milliGRAM(s) IntraMuscular once  insulin glargine Injectable (LANTUS) 12 Unit(s) SubCutaneous at bedtime  insulin lispro (ADMELOG) corrective regimen sliding scale   SubCutaneous three times a day before meals  insulin lispro (ADMELOG) corrective regimen sliding scale   SubCutaneous at bedtime  insulin lispro Injectable (ADMELOG) 6 Unit(s) SubCutaneous three times a day before meals  losartan 50 milliGRAM(s) Oral daily  melatonin 9 milliGRAM(s) Oral at bedtime  metoprolol tartrate 50 milliGRAM(s) Oral two times a day  pantoprazole    Tablet 40 milliGRAM(s) Oral before breakfast  simvastatin 20 milliGRAM(s) Oral at bedtime    MEDICATIONS  (PRN):  acetaminophen     Tablet .. 650 milliGRAM(s) Oral every 4 hours PRN Temp greater or equal to 38C (100.4F), Severe Pain (7 - 10)  ALBUTerol    90 MICROgram(s) HFA Inhaler 2 Puff(s) Inhalation every 6 hours PRN Bronchospasm  dextrose Oral Gel 15 Gram(s) Oral once PRN Blood Glucose LESS THAN 70 milliGRAM(s)/deciliter  guaiFENesin Oral Liquid (Sugar-Free) 100 milliGRAM(s) Oral every 6 hours PRN Cough  metoprolol tartrate Injectable 5 milliGRAM(s) IV Push every 6 hours PRN sustained HR over 140 bpm      T(C): 36.5 (07-17-22 @ 06:00), Max: 37.1 (07-16-22 @ 22:10)  HR: 91 (07-17-22 @ 06:00) (91 - 150)  BP: 146/78 (07-17-22 @ 06:00) (143/78 - 159/89)  RR: 18 (07-17-22 @ 06:00) (18 - 18)  SpO2: 99% (07-17-22 @ 06:00) (95% - 100%)    CAPILLARY BLOOD GLUCOSE      POCT Blood Glucose.: 213 mg/dL (17 Jul 2022 08:13)  POCT Blood Glucose.: 216 mg/dL (17 Jul 2022 04:51)  POCT Blood Glucose.: 234 mg/dL (17 Jul 2022 01:20)  POCT Blood Glucose.: 340 mg/dL (16 Jul 2022 21:20)  POCT Blood Glucose.: 296 mg/dL (16 Jul 2022 17:33)  POCT Blood Glucose.: 349 mg/dL (16 Jul 2022 15:47)  POCT Blood Glucose.: 424 mg/dL (16 Jul 2022 13:55)  POCT Blood Glucose.: 402 mg/dL (16 Jul 2022 12:31)  POCT Blood Glucose.: 197 mg/dL (16 Jul 2022 08:38)    I&O's Summary    16 Jul 2022 07:01  -  17 Jul 2022 07:00  --------------------------------------------------------  IN: 510 mL / OUT: 0 mL / NET: 510 mL        PHYSICAL EXAM:  GENERAL:  HEAD:  EYES:  NECK:  CHEST/LUNG:  HEART:  ABDOMEN:  EXTREMITIES:  PSYCH:  NEUROLOGY:  SKIN:    LABS:                        11.4   10.63 )-----------( 193      ( 17 Jul 2022 05:50 )             37.1     07-17    148<H>  |  113<H>  |  64<H>  ----------------------------<  246<H>  4.7   |  24  |  1.01    Ca    8.9      17 Jul 2022 05:50  Phos  3.6     07-17  Mg     2.20     07-17    TPro  6.3  /  Alb  3.1<L>  /  TBili  0.8  /  DBili  0.2  /  AST  24  /  ALT  28  /  AlkPhos  82  07-17    PT/INR - ( 17 Jul 2022 05:50 )   PT: 21.0 sec;   INR: 1.80 ratio                   RADIOLOGY & ADDITIONAL TESTS:    Imaging Personally Reviewed: no new imaging    Consultant(s) Notes Reviewed: no new consultant notes at this time    Care Discussed with Consultants/Other Providers:  Anne Terrazas MD  Attending Physician | Internal Medicine  [phone] / [phone]    Patient is a 89y old  Male who presents with a chief complaint of Increasing confusion, cold like symptoms, covid exposure (16 Jul 2022 07:17)      SUBJECTIVE / OVERNIGHT EVENTS: Afebrile, WBC 10.63. The patient was noted to be hyperglycemic to the 400s yesterday, negative BHB, AG 12, made NPO, given extra units of admelog, with improvement this AM to the 200s. The patient was noted to have a non-sustained tachycardia to 150 in the afternoon. The patient appears more comfortable at this time, with normal breathing rate. He is moaning but not otherwise interactive.    MEDICATIONS  (STANDING):  apixaban 5 milliGRAM(s) Oral every 12 hours  dexAMETHasone  Injectable 6 milliGRAM(s) IV Push daily  dextrose 5%. 1000 milliLiter(s) (100 mL/Hr) IV Continuous <Continuous>  dextrose 5%. 1000 milliLiter(s) (50 mL/Hr) IV Continuous <Continuous>  dextrose 50% Injectable 25 Gram(s) IV Push once  dextrose 50% Injectable 12.5 Gram(s) IV Push once  dextrose 50% Injectable 25 Gram(s) IV Push once  donepezil 5 milliGRAM(s) Oral at bedtime  glucagon  Injectable 1 milliGRAM(s) IntraMuscular once  insulin glargine Injectable (LANTUS) 12 Unit(s) SubCutaneous at bedtime  insulin lispro (ADMELOG) corrective regimen sliding scale   SubCutaneous three times a day before meals  insulin lispro (ADMELOG) corrective regimen sliding scale   SubCutaneous at bedtime  insulin lispro Injectable (ADMELOG) 6 Unit(s) SubCutaneous three times a day before meals  losartan 50 milliGRAM(s) Oral daily  melatonin 9 milliGRAM(s) Oral at bedtime  metoprolol tartrate 50 milliGRAM(s) Oral two times a day  pantoprazole    Tablet 40 milliGRAM(s) Oral before breakfast  simvastatin 20 milliGRAM(s) Oral at bedtime    MEDICATIONS  (PRN):  acetaminophen     Tablet .. 650 milliGRAM(s) Oral every 4 hours PRN Temp greater or equal to 38C (100.4F), Severe Pain (7 - 10)  ALBUTerol    90 MICROgram(s) HFA Inhaler 2 Puff(s) Inhalation every 6 hours PRN Bronchospasm  dextrose Oral Gel 15 Gram(s) Oral once PRN Blood Glucose LESS THAN 70 milliGRAM(s)/deciliter  guaiFENesin Oral Liquid (Sugar-Free) 100 milliGRAM(s) Oral every 6 hours PRN Cough  metoprolol tartrate Injectable 5 milliGRAM(s) IV Push every 6 hours PRN sustained HR over 140 bpm      T(C): 36.5 (07-17-22 @ 06:00), Max: 37.1 (07-16-22 @ 22:10)  HR: 91 (07-17-22 @ 06:00) (91 - 150)  BP: 146/78 (07-17-22 @ 06:00) (143/78 - 159/89)  RR: 18 (07-17-22 @ 06:00) (18 - 18)  SpO2: 99% (07-17-22 @ 06:00) (95% - 100%)    CAPILLARY BLOOD GLUCOSE      POCT Blood Glucose.: 213 mg/dL (17 Jul 2022 08:13)  POCT Blood Glucose.: 216 mg/dL (17 Jul 2022 04:51)  POCT Blood Glucose.: 234 mg/dL (17 Jul 2022 01:20)  POCT Blood Glucose.: 340 mg/dL (16 Jul 2022 21:20)  POCT Blood Glucose.: 296 mg/dL (16 Jul 2022 17:33)  POCT Blood Glucose.: 349 mg/dL (16 Jul 2022 15:47)  POCT Blood Glucose.: 424 mg/dL (16 Jul 2022 13:55)  POCT Blood Glucose.: 402 mg/dL (16 Jul 2022 12:31)  POCT Blood Glucose.: 197 mg/dL (16 Jul 2022 08:38)    I&O's Summary    16 Jul 2022 07:01  -  17 Jul 2022 07:00  --------------------------------------------------------  IN: 510 mL / OUT: 0 mL / NET: 510 mL    T(C): 36.5 (07-17-22 @ 06:00), Max: 37.1 (07-16-22 @ 22:10)  HR: 91 (07-17-22 @ 06:00) (91 - 150)  BP: 146/78 (07-17-22 @ 06:00) (143/78 - 159/89)  RR: 18 (07-17-22 @ 06:00) (18 - 18)  SpO2: 99% (07-17-22 @ 06:00) (95% - 99%)    Gen: awake, moaning  HENT: MM very dry  Lymph: no LAD noted in neck  Eye: sclerae anicteric  CV: normal rate, irregular rhythm  Pulm: CTAB anteriorly  Abd: soft, NT, ND  Skin: warm, dry  Ext: no LE edema  Neuro: not interactive  Psych: unable to assess    LABS:                        11.4   10.63 )-----------( 193      ( 17 Jul 2022 05:50 )             37.1     07-17    148<H>  |  113<H>  |  64<H>  ----------------------------<  246<H>  4.7   |  24  |  1.01    Ca    8.9      17 Jul 2022 05:50  Phos  3.6     07-17  Mg     2.20     07-17    TPro  6.3  /  Alb  3.1<L>  /  TBili  0.8  /  DBili  0.2  /  AST  24  /  ALT  28  /  AlkPhos  82  07-17    PT/INR - ( 17 Jul 2022 05:50 )   PT: 21.0 sec;   INR: 1.80 ratio                   RADIOLOGY & ADDITIONAL TESTS:    Imaging Personally Reviewed: no new imaging    Consultant(s) Notes Reviewed: no new consultant notes at this time    Care Discussed with Consultants/Other Providers:

## 2022-07-17 NOTE — PROGRESS NOTE ADULT - PROBLEM SELECTOR PLAN 4
- A-fib with intermittent RVR.  - Continue home apixaban.  - Continue metoprolol - increased to 50mg BID.  - On PRN IV metoprolol for sustained breakthrough A-fib with RVR.  - TropT trend of 49->42 not consistent with ACS, likely mild demand from infection.  - TTE with mild LV systolic dysfunction and stage II diastolic dysfunction. - With worsening mental status in the setting of acute infection.  - If mental status does not start to improve with treatment and improvement with other symptoms, or acute mental status change or change in neuro exam, consider CT head for further evaluation.  - Continue donepezil.  - Has been on haloperidol PRN.

## 2022-07-17 NOTE — PROGRESS NOTE ADULT - ASSESSMENT
89M h/o dementia (A&Ox2 at baseline, A-fib (apixaban), DM2, HTN, [place] admitted 7/11 with cough / sneezing / increased confusion after SARS-CoV-2 exposure found to have COVID-19. Course c/b intermittent tachycardia, also with hypernatremia and hyperglycemia in the setting of steroid use.

## 2022-07-17 NOTE — PROGRESS NOTE ADULT - PROBLEM SELECTOR PLAN 3
- With worsening mental status in the setting of acute infection.  - If mental status does not start to improve with treatment and improvement with other symptoms, or acute mental status change or change in neuro exam, consider CT head for further evaluation.  - Continue donepezil.  - Has been on haloperidol PRN. - Exposed 7/6/2022.  - Vaccinated and boosted x2 (Pfizer, LD 04/2022).  - Afebrile, on RA.  - Completed remdesivir.  - Continues on dexamethasone through 7/20/2022.  - Continue to trend D-dimer, CRP.  - Maintain isolation precautions as per hospital protocol.  - Supportive Care.

## 2022-07-18 ENCOUNTER — TRANSCRIPTION ENCOUNTER (OUTPATIENT)
Age: 87
End: 2022-07-18

## 2022-07-18 LAB
ALBUMIN SERPL ELPH-MCNC: 3.3 G/DL — SIGNIFICANT CHANGE UP (ref 3.3–5)
ALP SERPL-CCNC: 85 U/L — SIGNIFICANT CHANGE UP (ref 40–120)
ALT FLD-CCNC: 25 U/L — SIGNIFICANT CHANGE UP (ref 4–41)
ANION GAP SERPL CALC-SCNC: 13 MMOL/L — SIGNIFICANT CHANGE UP (ref 7–14)
AST SERPL-CCNC: 21 U/L — SIGNIFICANT CHANGE UP (ref 4–40)
BASOPHILS # BLD AUTO: 0.03 K/UL — SIGNIFICANT CHANGE UP (ref 0–0.2)
BASOPHILS NFR BLD AUTO: 0.3 % — SIGNIFICANT CHANGE UP (ref 0–2)
BILIRUB DIRECT SERPL-MCNC: 0.3 MG/DL — SIGNIFICANT CHANGE UP (ref 0–0.3)
BILIRUB INDIRECT FLD-MCNC: 0.5 MG/DL — SIGNIFICANT CHANGE UP (ref 0–1)
BILIRUB SERPL-MCNC: 0.8 MG/DL — SIGNIFICANT CHANGE UP (ref 0.2–1.2)
BUN SERPL-MCNC: 73 MG/DL — HIGH (ref 7–23)
CALCIUM SERPL-MCNC: 8.7 MG/DL — SIGNIFICANT CHANGE UP (ref 8.4–10.5)
CHLORIDE SERPL-SCNC: 115 MMOL/L — HIGH (ref 98–107)
CO2 SERPL-SCNC: 23 MMOL/L — SIGNIFICANT CHANGE UP (ref 22–31)
CREAT SERPL-MCNC: 1.24 MG/DL — SIGNIFICANT CHANGE UP (ref 0.5–1.3)
CREAT SERPL-MCNC: 1.31 MG/DL — HIGH (ref 0.5–1.3)
CRP SERPL-MCNC: 43 MG/L — HIGH
D DIMER BLD IA.RAPID-MCNC: 180 NG/ML DDU — SIGNIFICANT CHANGE UP
EGFR: 52 ML/MIN/1.73M2 — LOW
EGFR: 56 ML/MIN/1.73M2 — LOW
EOSINOPHIL # BLD AUTO: 0 K/UL — SIGNIFICANT CHANGE UP (ref 0–0.5)
EOSINOPHIL NFR BLD AUTO: 0 % — SIGNIFICANT CHANGE UP (ref 0–6)
FERRITIN SERPL-MCNC: 982 NG/ML — HIGH (ref 30–400)
GLUCOSE BLDC GLUCOMTR-MCNC: 176 MG/DL — HIGH (ref 70–99)
GLUCOSE BLDC GLUCOMTR-MCNC: 201 MG/DL — HIGH (ref 70–99)
GLUCOSE BLDC GLUCOMTR-MCNC: 201 MG/DL — HIGH (ref 70–99)
GLUCOSE BLDC GLUCOMTR-MCNC: 207 MG/DL — HIGH (ref 70–99)
GLUCOSE BLDC GLUCOMTR-MCNC: 245 MG/DL — HIGH (ref 70–99)
GLUCOSE BLDC GLUCOMTR-MCNC: 335 MG/DL — HIGH (ref 70–99)
GLUCOSE BLDC GLUCOMTR-MCNC: 410 MG/DL — HIGH (ref 70–99)
GLUCOSE BLDC GLUCOMTR-MCNC: 420 MG/DL — HIGH (ref 70–99)
GLUCOSE SERPL-MCNC: 236 MG/DL — HIGH (ref 70–99)
HCT VFR BLD CALC: 35.9 % — LOW (ref 39–50)
HGB BLD-MCNC: 11.3 G/DL — LOW (ref 13–17)
IANC: 9.13 K/UL — HIGH (ref 1.8–7.4)
IMM GRANULOCYTES NFR BLD AUTO: 2.5 % — HIGH (ref 0–1.5)
INR BLD: 1.68 RATIO — HIGH (ref 0.88–1.16)
LDH SERPL L TO P-CCNC: 272 U/L — HIGH (ref 135–225)
LYMPHOCYTES # BLD AUTO: 0.71 K/UL — LOW (ref 1–3.3)
LYMPHOCYTES # BLD AUTO: 6.6 % — LOW (ref 13–44)
MAGNESIUM SERPL-MCNC: 2.2 MG/DL — SIGNIFICANT CHANGE UP (ref 1.6–2.6)
MCHC RBC-ENTMCNC: 31.5 GM/DL — LOW (ref 32–36)
MCHC RBC-ENTMCNC: 31.7 PG — SIGNIFICANT CHANGE UP (ref 27–34)
MCV RBC AUTO: 100.8 FL — HIGH (ref 80–100)
MONOCYTES # BLD AUTO: 0.61 K/UL — SIGNIFICANT CHANGE UP (ref 0–0.9)
MONOCYTES NFR BLD AUTO: 5.7 % — SIGNIFICANT CHANGE UP (ref 2–14)
NEUTROPHILS # BLD AUTO: 9.13 K/UL — HIGH (ref 1.8–7.4)
NEUTROPHILS NFR BLD AUTO: 84.9 % — HIGH (ref 43–77)
NRBC # BLD: 0 /100 WBCS — SIGNIFICANT CHANGE UP
NRBC # FLD: 0.02 K/UL — HIGH
PHOSPHATE SERPL-MCNC: 4.9 MG/DL — HIGH (ref 2.5–4.5)
PLATELET # BLD AUTO: 228 K/UL — SIGNIFICANT CHANGE UP (ref 150–400)
POTASSIUM SERPL-MCNC: 4.4 MMOL/L — SIGNIFICANT CHANGE UP (ref 3.5–5.3)
POTASSIUM SERPL-SCNC: 4.4 MMOL/L — SIGNIFICANT CHANGE UP (ref 3.5–5.3)
PROT SERPL-MCNC: 6.5 G/DL — SIGNIFICANT CHANGE UP (ref 6–8.3)
PROTHROM AB SERPL-ACNC: 19.6 SEC — HIGH (ref 10.5–13.4)
RBC # BLD: 3.56 M/UL — LOW (ref 4.2–5.8)
RBC # FLD: 14.8 % — HIGH (ref 10.3–14.5)
SODIUM SERPL-SCNC: 151 MMOL/L — HIGH (ref 135–145)
WBC # BLD: 10.75 K/UL — HIGH (ref 3.8–10.5)
WBC # FLD AUTO: 10.75 K/UL — HIGH (ref 3.8–10.5)

## 2022-07-18 PROCEDURE — 99233 SBSQ HOSP IP/OBS HIGH 50: CPT

## 2022-07-18 RX ORDER — SODIUM CHLORIDE 9 MG/ML
1000 INJECTION, SOLUTION INTRAVENOUS
Refills: 0 | Status: DISCONTINUED | OUTPATIENT
Start: 2022-07-18 | End: 2022-08-06

## 2022-07-18 RX ORDER — INSULIN GLARGINE 100 [IU]/ML
6 INJECTION, SOLUTION SUBCUTANEOUS AT BEDTIME
Refills: 0 | Status: DISCONTINUED | OUTPATIENT
Start: 2022-07-18 | End: 2022-07-19

## 2022-07-18 RX ORDER — DEXTROSE 50 % IN WATER 50 %
25 SYRINGE (ML) INTRAVENOUS ONCE
Refills: 0 | Status: DISCONTINUED | OUTPATIENT
Start: 2022-07-18 | End: 2022-08-06

## 2022-07-18 RX ORDER — SODIUM CHLORIDE 9 MG/ML
1000 INJECTION, SOLUTION INTRAVENOUS
Refills: 0 | Status: DISCONTINUED | OUTPATIENT
Start: 2022-07-18 | End: 2022-07-19

## 2022-07-18 RX ORDER — DEXTROSE 50 % IN WATER 50 %
15 SYRINGE (ML) INTRAVENOUS ONCE
Refills: 0 | Status: DISCONTINUED | OUTPATIENT
Start: 2022-07-18 | End: 2022-08-06

## 2022-07-18 RX ORDER — GLUCAGON INJECTION, SOLUTION 0.5 MG/.1ML
1 INJECTION, SOLUTION SUBCUTANEOUS ONCE
Refills: 0 | Status: DISCONTINUED | OUTPATIENT
Start: 2022-07-18 | End: 2022-08-06

## 2022-07-18 RX ORDER — AMLODIPINE BESYLATE 2.5 MG/1
5 TABLET ORAL DAILY
Refills: 0 | Status: DISCONTINUED | OUTPATIENT
Start: 2022-07-19 | End: 2022-07-23

## 2022-07-18 RX ORDER — DEXTROSE 50 % IN WATER 50 %
12.5 SYRINGE (ML) INTRAVENOUS ONCE
Refills: 0 | Status: DISCONTINUED | OUTPATIENT
Start: 2022-07-18 | End: 2022-08-06

## 2022-07-18 RX ADMIN — Medication 9 MILLIGRAM(S): at 23:04

## 2022-07-18 RX ADMIN — APIXABAN 5 MILLIGRAM(S): 2.5 TABLET, FILM COATED ORAL at 23:03

## 2022-07-18 RX ADMIN — DONEPEZIL HYDROCHLORIDE 5 MILLIGRAM(S): 10 TABLET, FILM COATED ORAL at 23:03

## 2022-07-18 RX ADMIN — Medication 6: at 18:07

## 2022-07-18 RX ADMIN — SODIUM CHLORIDE 75 MILLILITER(S): 9 INJECTION, SOLUTION INTRAVENOUS at 12:19

## 2022-07-18 RX ADMIN — Medication 50 MILLIGRAM(S): at 18:07

## 2022-07-18 RX ADMIN — Medication 4: at 23:02

## 2022-07-18 RX ADMIN — APIXABAN 5 MILLIGRAM(S): 2.5 TABLET, FILM COATED ORAL at 09:45

## 2022-07-18 RX ADMIN — SIMVASTATIN 20 MILLIGRAM(S): 20 TABLET, FILM COATED ORAL at 23:04

## 2022-07-18 RX ADMIN — Medication 6 MILLIGRAM(S): at 05:11

## 2022-07-18 RX ADMIN — INSULIN GLARGINE 6 UNIT(S): 100 INJECTION, SOLUTION SUBCUTANEOUS at 23:03

## 2022-07-18 RX ADMIN — Medication 2: at 12:18

## 2022-07-18 RX ADMIN — Medication 2: at 05:11

## 2022-07-18 RX ADMIN — Medication 5 MILLIGRAM(S): at 13:09

## 2022-07-18 NOTE — PROVIDER CONTACT NOTE (OTHER) - RECOMMENDATIONS
As per PA, administer PRN lopressor now
As per PA, administer PRN tylenol now q4 hours
As per PA, continue with fluids running at 75mL/hr, encourage PO hydration and f/u creatinine in AM
As per PA, okay to give Metroprolol now. Medication given, HR improved- sustaining 100's-110's.
Per PA, hold sliding scale prior to dinner as pt received 18 units this afternoon and is currently NPO on LR. Will reassess giving insulin at bedtime. Pre-Meal insulin to be held as pt is NPO.
As per PA at bedside, give PRN tylenol now and will reorder IVP lopressor for HR
As per PA, continue with supportive cooling measures at this time and continue with PRN tylenol q4 hours
Bladder scan ordered, upon entering pt's room a large amount of urine output was noted on abdoulaye. Bladder scan showed 175 ml. Will continue to monitor pt at this time.
Insulin premeal and sliding scale given as per orders. Recheck FS an hour later shown to be 424, pt made NPO. PA at bedside to assess pt.
PA at bedside, as per PA, give scheduled PO lopressor now, increase O2 to 4L and have patient remain on 4L for time being. As per PA, will order chest x-ray
As per PA, bladder scan patient
As per PA, continue to monitor temp and administer PRN tylenol q6 as ordered
As per PA, no interventions at this time. Will continue to monitor pt.
ALEKSANDER Longo made aware; give sliding scale insulin
As per PA, administer PO metoprolol now
As per PA, administer PO tylenol now and reassess vitals in 1 hour
As per PA, continue to monitor at this time
As per PA, temperature to be taken rectally. Rectal temp: 98.9 F. Will continue to monitor HR and as per PA, will notify PA if HR begins to sustain in the 120's.
As per PA, will come to bedside to assess
LIBBY Nieto at bedside to assess pt. No new orders at this time, pt due to get metoprolol at 1800.
Metoprolol per eMAR
PA made aware. Tylenol  mg given 2.5 mg Lopressor IVP.
PA made aware. Tylenol to give q4h
PA made aware. lopressor prn order of 5 mg was given IVP.
As per PA at bedside, continue to monitor on 2L NC and wean as tolerated
PA Notified, recommending speech and swallow

## 2022-07-18 NOTE — DISCHARGE NOTE PROVIDER - CARE PROVIDER_API CALL
Eym Paz)  Internal Medicine  [address], [address]  [place] [address]  Phone: [phone]  Fax: ()-  Follow Up Time:

## 2022-07-18 NOTE — PROVIDER CONTACT NOTE (OTHER) - ASSESSMENT
Patient continues at baseline, noted with periods of restlessness altered with periods of rest. Patient NPO including meds. HR noted fluctuating bt. 120's - 150's on tele.

## 2022-07-18 NOTE — PROGRESS NOTE ADULT - PROBLEM SELECTOR PLAN 5
- Now improving, with worsening mental status in the setting of acute infection.  - If mental status does not continue to improve with treatment and improvement with other symptoms, or acute mental status change or change in neuro exam, consider CT head for further evaluation.  - Continue donepezil.  - Has been on haloperidol PRN.

## 2022-07-18 NOTE — PROGRESS NOTE ADULT - ASSESSMENT
89M h/o dementia (A&Ox2 at baseline, A-fib (apixaban), DM2, HTN, [place] admitted 7/11 with cough / sneezing / increased confusion after SARS-CoV-2 exposure found to have COVID-19. Course c/b intermittent tachycardia, also with hypernatremia and hyperglycemia in the setting of steroid use.

## 2022-07-18 NOTE — DISCHARGE NOTE PROVIDER - NSDCCPCAREPLAN_GEN_ALL_CORE_FT
PRINCIPAL DISCHARGE DIAGNOSIS  Diagnosis: AMS (altered mental status)  Assessment and Plan of Treatment: You were found to have a change in your mental status. You had COVID -19 and were treated with medications for it which is improving. If you noticed any changes in mental status, facial droops, loss of movement on one side, and/or decreased sensation on one side of the body, diffiuclty swallowing, please go to the nearest emergency department for further workup.      SECONDARY DISCHARGE DIAGNOSES  Diagnosis: 2019 novel coronavirus disease (COVID-19)  Assessment and Plan of Treatment: You have been diagnosed with the COVID-19 virus during your hospital stay. You must self quarantine to complete a 14 day time period.  Monitor for fevers, shortness of breath and cough primarily.  Monitor your temperature daily to not any changes and increases.    It has been determined that you no longer need hospitalization and can recover while remaining in self-quarantine at home. You should follow the prevention steps below until a healthcare provider or local or state health department says you can return to your normal activities.  1. You should restrict activities outside your home, except for getting medical care.  2. Do not go to work, school, or public areas.  3. Avoid using public transportation, ride-sharing, or taxis.  4. Separate yourself from other people and animals in your home.  5. Call ahead before visiting your doctor.  6. Wear a facemask.  7. Cover your coughs and sneezes.  8. Clean your hands often.  9. Avoid sharing personal household items.  10. Clean all “high-touch” surfaces everyday.  11. Monitor your symptoms.  If you have a medical emergency and need to call 911, notify the dispatch personnel that you have COVID-19 If possible, put on a facemask before emergency medical services arrive.  12. Stopping home isolation.  Patients with confirmed COVID-19 should remain under home isolation precautions for 14 days since the positive COVID-19 test and until the risk of secondary transmission to others is thought to be low. The decision to discontinue home isolation precautions should be made on a case-by-case basis, in consultation with healthcare providers and state and local health departments. Your [facility] Department of Health can be reached at [phone] for further information about COVID-19.    Diagnosis: Afib  Assessment and Plan of Treatment: Please take your medications as prescribed.  Continue to take your blood thinner as prescribed and follow with your physician to monitor your levels.  Low fat diet, reduce caffeine intake, and exercise at least 30 minutes daily.    Diagnosis: Hypertension  Assessment and Plan of Treatment: Low sodium and fat diet, continue anti-hypertensive medications, and follow up with primary care physician.    Diagnosis: Diabetes mellitus  Assessment and Plan of Treatment: Monitor finger sticks pre-meal and bedtime, low salt, fat and carbohydrate diet, minimize glucose intake.  Exercise daily for at least 30 minutes and weight loss.  Follow up with primary care physician and endocrinologist for routine Hemoglobin A1C checks and management.  Follow up with your ophthalmologist for routine yearly vision exams.     PRINCIPAL DISCHARGE DIAGNOSIS  Diagnosis: AMS (altered mental status)  Assessment and Plan of Treatment: You were found to have a change in your mental status. You had COVID -19 and were treated with medications for it which is improving. If you noticed any changes in mental status, facial droops, loss of movement on one side, and/or decreased sensation on one side of the body, diffiuclty swallowing, please go to the nearest emergency department for further workup.      SECONDARY DISCHARGE DIAGNOSES  Diagnosis: Pneumonia, aspiration  Assessment and Plan of Treatment: Completed course of IV antibiotics   on NC 2L, recent covid infection 7/10 s/p rem/dex  CT chest (7/27) Multifocal and bilateral tree-in-bud nodular opacities and patchy   consolidation compatible with aspiration or infection. Occluded LLL airways presumably secondary to secretions/mucous plugging and complete LLL collapse. Small bilateral pleural effusions.  Chest PT    Diagnosis: 2019 novel coronavirus disease (COVID-19)  Assessment and Plan of Treatment: You have been diagnosed with the COVID-19 virus during your hospital stay. You must self quarantine to complete a 14 day time period.  Monitor for fevers, shortness of breath and cough primarily.  Monitor your temperature daily to not any changes and increases.    It has been determined that you no longer need hospitalization and can recover while remaining in self-quarantine at home. You should follow the prevention steps below until a healthcare provider or local or state health department says you can return to your normal activities.  1. You should restrict activities outside your home, except for getting medical care.  2. Do not go to work, school, or public areas.  3. Avoid using public transportation, ride-sharing, or taxis.  4. Separate yourself from other people and animals in your home.  5. Call ahead before visiting your doctor.  6. Wear a facemask.  7. Cover your coughs and sneezes.  8. Clean your hands often.  9. Avoid sharing personal household items.  10. Clean all “high-touch” surfaces everyday.  11. Monitor your symptoms.  If you have a medical emergency and need to call 911, notify the dispatch personnel that you have COVID-19 If possible, put on a facemask before emergency medical services arrive.  12. Stopping home isolation.  Patients with confirmed COVID-19 should remain under home isolation precautions for 14 days since the positive COVID-19 test and until the risk of secondary transmission to others is thought to be low. The decision to discontinue home isolation precautions should be made on a case-by-case basis, in consultation with healthcare providers and state and local health departments. Your [facility] Department of Health can be reached at [phone] for further information about COVID-19.    Diagnosis: Diabetes mellitus  Assessment and Plan of Treatment: Monitor finger sticks pre-meal and bedtime, low salt, fat and carbohydrate diet, minimize glucose intake.  Exercise daily for at least 30 minutes and weight loss.  Follow up with primary care physician and endocrinologist for routine Hemoglobin A1C checks and management.  Follow up with your ophthalmologist for routine yearly vision exams.    Diagnosis: Afib  Assessment and Plan of Treatment: Please take your medications as prescribed.  Continue to take your blood thinner as prescribed and follow with your physician to monitor your levels.  Low fat diet, reduce caffeine intake, and exercise at least 30 minutes daily.    Diagnosis: Hypertension  Assessment and Plan of Treatment: Low sodium and fat diet, continue anti-hypertensive medications, and follow up with primary care physician.    Diagnosis: Dysphagia, unspecified  Assessment and Plan of Treatment: -Diet advanced to puree diet, pleasure feeds per family, advised family high risk of aspiration  dc plan to LTC placement with transition to hospice  No plan for NGT or tube feed    Diagnosis: Palliative care encounter  Assessment and Plan of Treatment:    DC plan LTC facility with transition to hospice.

## 2022-07-18 NOTE — PROGRESS NOTE ADULT - PROBLEM SELECTOR PLAN 9
DVT prophylaxis: therapeutic apixaban.  Diet: CC / DASH/TLC. Purred/Mod thick liquids per SLP  Dispo: pending [facility].  GOC: DNR/DNI.

## 2022-07-18 NOTE — DISCHARGE NOTE PROVIDER - HOSPITAL COURSE
89M h/o dementia (A&Ox2 at baseline, A-fib (apixaban), DM2, HTN, [facility] admitted 7/11 with cough / sneezing / increased confusion after SARS-CoV-2 exposure found to have COVID-19. Course c/b intermittent tachycardia, also with hypernatremia and hyperglycemia in the setting of steroid use. Patient was hypernatremic and will be on hypotonic fluids for 24 hours. BUN is elevated likely due to poor PO intake. patient had dysphagia during this hospitalization and was seen by Speech and swallow who recommended pureed with mildly thick liquids. COVID exposure on 7/6/2022 and was positive for covid on 7/10/2022. Completed remdsivir. Continue with dexamethasone till 7/20/2022. Metoprolol increased to 50 mg bid for Afib with RVR. TTE with mild LV systolic dysfunction and stage II diastolic dysfunction. Patient was hyperglycemic due to steroids . Monitoring blood glucose during this admission and is trending down.     Case discussed with  __ on ___. Patient is medically stable and cleared for discharge. 89 year old male with dementia (AAO x 2 at baseline), Afib on eliquis, DM, HTN, [place], recently COVID-19+ on 7/10 s/p Dexamethasone and Remdesivir, presenting for AMS with worsening confusion and decreased responsiveness, found to be in septic shock secondary to aspiration pneumonia    Pneumonia, aspiration.   c/w zosyn to complete 7-10 day course for Aspiration PNA, dose adjusted per renal fxn  -on NC 2L, recent covid infection 7/10 s/p rem/dex  -CT chest (7/27) Multifocal and bilateral tree-in-bud nodular opacities and patchy   consolidation compatible with aspiration or infection. Occluded LLL airways presumably secondary to secretions/mucous plugging and complete LLL collapse. Small bilateral pleural effusions.  -Chest PT  -Diet advanced to puree diet, pleasure feeds per family, advised family high risk of aspiration  -overall prognosis poor with multiple comorbidities, seen by palliative care, family okay with pleasure feeds and dc plan to LTC placement with transition to hospice, f/u SW   No plan for NGT or tube feed   -dispo: DC plan LTC facility with transition to hospice. prior provider updated son in law Arriola and daughter Maddie on 7/30, they are requesting PMR consult to [facility] for rehab candidacy still hold off on labs and IVF at this time per their conversation, f/u PMR recs.    Dementia.   # acute metabolic encephalopathy superimposed on baseline dementia, very hard of hearing  - Agitation 2/2 baseline dementia - haldol PRN when QTc is appropriate (<500ms)  - trial of iv thiamine as he appears malnourished  -avoid benzo/sedatives, frequent reorientation.    Afib.   was on eliquis, currently off, monitor off for now.    -FS q6h and SSI  - has been hyperglycemic since receiving steroid, dc steroid as no clear evidence of adrenal insufficiency  -A1c 6.1%  off lantus now, encourage po intake with assist.    Hypertension.    Hemodynamically stable, off pressors  d/c stress steroid, am cortisol 18.3 (no adrenal insufficiency)  monitor bp.    Hypernatremia.   TENZIN with hypernatremia, creat downtrending  hypernatremia resolved  hypokalemia repleted  no further labs per family.    TENZIN (acute kidney injury).   as above, avoid nephrotoxins, IVF. 89 year old male with dementia (AAO x 2 at baseline), Afib on eliquis, DM, HTN, [place], recently COVID-19+ on 7/10 s/p Dexamethasone and Remdesivir, presenting for AMS with worsening confusion and decreased responsiveness, found to be in septic shock secondary to aspiration pneumonia    Pneumonia, aspiration.   c/w zosyn to complete 7-10 day course for Aspiration PNA, dose adjusted per renal fxn  -on NC 2L, recent covid infection 7/10 s/p rem/dex  -CT chest (7/27) Multifocal and bilateral tree-in-bud nodular opacities and patchy   consolidation compatible with aspiration or infection. Occluded LLL airways presumably secondary to secretions/mucous plugging and complete LLL collapse. Small bilateral pleural effusions.  -Chest PT  -Diet advanced to puree diet, pleasure feeds per family, advised family high risk of aspiration  -overall prognosis poor with multiple comorbidities, seen by palliative care, family okay with pleasure feeds and dc plan to LTC placement with transition to hospice, f/u SW   No plan for NGT or tube feed   -dispo: DC plan LTC facility with transition to hospice.     Dementia.   # acute metabolic encephalopathy superimposed on baseline dementia, very hard of hearing  - Agitation 2/2 baseline dementia - haldol PRN when QTc is appropriate (<500ms)  - trial of iv thiamine as he appears malnourished  -avoid benzo/sedatives, frequent reorientation.    Afib.   was on eliquis, currently off, monitor off for now.    -FS q6h and SSI  - has been hyperglycemic since receiving steroid, dc steroid as no clear evidence of adrenal insufficiency  -A1c 6.1%  off lantus now, encourage po intake with assist.    Hypertension.   Hemodynamically stable, off pressors  d/c stress steroid, am cortisol 18.3 (no adrenal insufficiency)      Hypernatremia.   TENZIN with hypernatremia, creat downtrending  hypernatremia resolved  hypokalemia repleted  no further labs per family.    TENZIN (acute kidney injury).   as above, avoid nephrotoxins, IVF.    dispo: subacute rehab per PMR.    Hospital course discussed with medical attending. Patient is medically stable for discharge to rehab

## 2022-07-18 NOTE — PROGRESS NOTE ADULT - PROBLEM SELECTOR PLAN 1
- 151 today   - likely no drinking enough, reed snot like thickened liquids   - trial of hypotonic fluids today   - continue to Encourage PO hydration.

## 2022-07-18 NOTE — PROGRESS NOTE ADULT - PROBLEM SELECTOR PLAN 3
in setting of advanced dementia and worsening from acute infection of COVID-19  SLP recommending purred and mod-thick liquids  will have SLP reassess liquids

## 2022-07-18 NOTE — PROGRESS NOTE ADULT - PROBLEM SELECTOR PLAN 2
# TENZIN   - Scr of 1.3 from basline of 0.9  - likely secondary to poor PO intake  - will continue to encourage PO water intake. Start on IVF  - Continue to monitor.

## 2022-07-18 NOTE — PROGRESS NOTE ADULT - SUBJECTIVE AND OBJECTIVE BOX
Patient is a 89y old  Male who presents with a chief complaint of Increasing confusion, cold like symptoms, covid exposure (17 Jul 2022 08:37)    Jay Davis MD   [facility] Division of Hospital Medicine   Pager [phone]  Reachable on Microsoft Teams     SUBJECTIVE / OVERNIGHT EVENTS:  Patient seen and examined today.    MEDICATIONS  (STANDING):  apixaban 5 milliGRAM(s) Oral every 12 hours  dexAMETHasone  Injectable 6 milliGRAM(s) IV Push daily  dextrose 5%. 1000 milliLiter(s) (100 mL/Hr) IV Continuous <Continuous>  dextrose 5%. 1000 milliLiter(s) (50 mL/Hr) IV Continuous <Continuous>  dextrose 5%. 1000 milliLiter(s) (75 mL/Hr) IV Continuous <Continuous>  dextrose 50% Injectable 25 Gram(s) IV Push once  dextrose 50% Injectable 12.5 Gram(s) IV Push once  dextrose 50% Injectable 25 Gram(s) IV Push once  donepezil 5 milliGRAM(s) Oral at bedtime  glucagon  Injectable 1 milliGRAM(s) IntraMuscular once  insulin lispro (ADMELOG) corrective regimen sliding scale   SubCutaneous every 6 hours  losartan 50 milliGRAM(s) Oral daily  melatonin 9 milliGRAM(s) Oral at bedtime  metoprolol tartrate 50 milliGRAM(s) Oral two times a day  pantoprazole    Tablet 40 milliGRAM(s) Oral before breakfast  simvastatin 20 milliGRAM(s) Oral at bedtime    MEDICATIONS  (PRN):  acetaminophen     Tablet .. 650 milliGRAM(s) Oral every 4 hours PRN Temp greater or equal to 38C (100.4F), Severe Pain (7 - 10)  ALBUTerol    90 MICROgram(s) HFA Inhaler 2 Puff(s) Inhalation every 6 hours PRN Bronchospasm  dextrose Oral Gel 15 Gram(s) Oral once PRN Blood Glucose LESS THAN 70 milliGRAM(s)/deciliter  guaiFENesin Oral Liquid (Sugar-Free) 100 milliGRAM(s) Oral every 6 hours PRN Cough  metoprolol tartrate Injectable 5 milliGRAM(s) IV Push every 6 hours PRN sustained HR over 140 bpm      Vital Signs Last 24 Hrs  T(C): 36.1 (18 Jul 2022 05:00), Max: 36.7 (17 Jul 2022 19:49)  T(F): 97 (18 Jul 2022 05:00), Max: 98.1 (17 Jul 2022 20:00)  HR: 81 (18 Jul 2022 05:00) (81 - 132)  BP: 147/85 (18 Jul 2022 05:00) (122/93 - 172/82)  BP(mean): 93 (17 Jul 2022 17:51) (93 - 93)  RR: 18 (18 Jul 2022 05:00) (18 - 18)  SpO2: 99% (18 Jul 2022 05:00) (98% - 99%)  CAPILLARY BLOOD GLUCOSE      POCT Blood Glucose.: 176 mg/dL (18 Jul 2022 06:28)  POCT Blood Glucose.: 201 mg/dL (18 Jul 2022 05:45)  POCT Blood Glucose.: 207 mg/dL (18 Jul 2022 05:27)  POCT Blood Glucose.: 201 mg/dL (18 Jul 2022 05:03)  POCT Blood Glucose.: 172 mg/dL (17 Jul 2022 23:31)  POCT Blood Glucose.: 207 mg/dL (17 Jul 2022 21:01)  POCT Blood Glucose.: 205 mg/dL (17 Jul 2022 17:58)  POCT Blood Glucose.: 180 mg/dL (17 Jul 2022 12:42)    I&O's Summary    17 Jul 2022 07:01  -  18 Jul 2022 07:00  --------------------------------------------------------  IN: 50 mL / OUT: 0 mL / NET: 50 mL        General: NAD, awake and alert  Eyes: PERRLA, nonicteric sclera  HENMT: NCAT, MMM  Neck: Supple,  trachea midline   Respiratory: No respiratory distress, CTABL, No rales, rhonchi, wheezing.  Cardiovascular: S1,S2; Regular rate and rhythm; No m/g/r. 2+ peripheral pulses  Gastrointestinal: Soft, Nontender, Nondistended; +BS.   Extremities: No c/c/e; warm to touch  Neurological: Moving all 4 extremities; Sensation to LT grossly in tact.  Skin: No rashes, No erythema   Psych: AAOx3; appropriate mood and affect    LABS:                        11.3   10.75 )-----------( 228      ( 18 Jul 2022 05:10 )             35.9     07-18    151<H>  |  115<H>  |  73<H>  ----------------------------<  236<H>  4.4   |  23  |  1.31<H>    Ca    8.7      18 Jul 2022 05:10  Phos  4.9     07-18  Mg     2.20     07-18    TPro  6.5  /  Alb  3.3  /  TBili  0.8  /  DBili  0.3  /  AST  21  /  ALT  25  /  AlkPhos  85  07-18    PT/INR - ( 18 Jul 2022 01:11 )   PT: 19.6 sec;   INR: 1.68 ratio                   RADIOLOGY & ADDITIONAL TESTS:    Imaging Personally Reviewed:    Consultant(s) Notes Reviewed:      Care Discussed with Consultants/Other Providers:   Patient is a 89y old  Male who presents with a chief complaint of Increasing confusion, cold like symptoms, covid exposure (17 Jul 2022 08:37)    Jay Davis MD   [facility] Division of Hospital Medicine   Pager [phone]  Reachable on Microsoft Teams     SUBJECTIVE / OVERNIGHT EVENTS:  Patient was interviewed with help of [employer], ID number [id number]  [facility]     MEDICATIONS  (STANDING):  apixaban 5 milliGRAM(s) Oral every 12 hours  dexAMETHasone  Injectable 6 milliGRAM(s) IV Push daily  dextrose 5%. 1000 milliLiter(s) (100 mL/Hr) IV Continuous <Continuous>  dextrose 5%. 1000 milliLiter(s) (50 mL/Hr) IV Continuous <Continuous>  dextrose 5%. 1000 milliLiter(s) (75 mL/Hr) IV Continuous <Continuous>  dextrose 50% Injectable 25 Gram(s) IV Push once  dextrose 50% Injectable 12.5 Gram(s) IV Push once  dextrose 50% Injectable 25 Gram(s) IV Push once  donepezil 5 milliGRAM(s) Oral at bedtime  glucagon  Injectable 1 milliGRAM(s) IntraMuscular once  insulin lispro (ADMELOG) corrective regimen sliding scale   SubCutaneous every 6 hours  losartan 50 milliGRAM(s) Oral daily  melatonin 9 milliGRAM(s) Oral at bedtime  metoprolol tartrate 50 milliGRAM(s) Oral two times a day  pantoprazole    Tablet 40 milliGRAM(s) Oral before breakfast  simvastatin 20 milliGRAM(s) Oral at bedtime    MEDICATIONS  (PRN):  acetaminophen     Tablet .. 650 milliGRAM(s) Oral every 4 hours PRN Temp greater or equal to 38C (100.4F), Severe Pain (7 - 10)  ALBUTerol    90 MICROgram(s) HFA Inhaler 2 Puff(s) Inhalation every 6 hours PRN Bronchospasm  dextrose Oral Gel 15 Gram(s) Oral once PRN Blood Glucose LESS THAN 70 milliGRAM(s)/deciliter  guaiFENesin Oral Liquid (Sugar-Free) 100 milliGRAM(s) Oral every 6 hours PRN Cough  metoprolol tartrate Injectable 5 milliGRAM(s) IV Push every 6 hours PRN sustained HR over 140 bpm      Vital Signs Last 24 Hrs  T(C): 36.1 (18 Jul 2022 05:00), Max: 36.7 (17 Jul 2022 19:49)  T(F): 97 (18 Jul 2022 05:00), Max: 98.1 (17 Jul 2022 20:00)  HR: 81 (18 Jul 2022 05:00) (81 - 132)  BP: 147/85 (18 Jul 2022 05:00) (122/93 - 172/82)  BP(mean): 93 (17 Jul 2022 17:51) (93 - 93)  RR: 18 (18 Jul 2022 05:00) (18 - 18)  SpO2: 99% (18 Jul 2022 05:00) (98% - 99%)  CAPILLARY BLOOD GLUCOSE      POCT Blood Glucose.: 176 mg/dL (18 Jul 2022 06:28)  POCT Blood Glucose.: 201 mg/dL (18 Jul 2022 05:45)  POCT Blood Glucose.: 207 mg/dL (18 Jul 2022 05:27)  POCT Blood Glucose.: 201 mg/dL (18 Jul 2022 05:03)  POCT Blood Glucose.: 172 mg/dL (17 Jul 2022 23:31)  POCT Blood Glucose.: 207 mg/dL (17 Jul 2022 21:01)  POCT Blood Glucose.: 205 mg/dL (17 Jul 2022 17:58)  POCT Blood Glucose.: 180 mg/dL (17 Jul 2022 12:42)    I&O's Summary    17 Jul 2022 07:01  -  18 Jul 2022 07:00  --------------------------------------------------------  IN: 50 mL / OUT: 0 mL / NET: 50 mL        General: NAD, awake and alert  Eyes: PERRLA, nonicteric sclera  HENMT: NCAT, MMM  Neck: Supple,  trachea midline   Respiratory: No respiratory distress, CTABL, No rales, rhonchi, wheezing.  Cardiovascular: S1,S2; Regular rate and rhythm; No m/g/r. 2+ peripheral pulses  Gastrointestinal: Soft, Nontender, Nondistended; +BS.   Extremities: No c/c/e; warm to touch  Neurological: Moving all 4 extremities; Sensation to LT grossly in tact.  Skin: No rashes, No erythema   Psych: AAOx3; appropriate mood and affect    LABS:                        11.3   10.75 )-----------( 228      ( 18 Jul 2022 05:10 )             35.9     07-18    151<H>  |  115<H>  |  73<H>  ----------------------------<  236<H>  4.4   |  23  |  1.31<H>    Ca    8.7      18 Jul 2022 05:10  Phos  4.9     07-18  Mg     2.20     07-18    TPro  6.5  /  Alb  3.3  /  TBili  0.8  /  DBili  0.3  /  AST  21  /  ALT  25  /  AlkPhos  85  07-18    PT/INR - ( 18 Jul 2022 01:11 )   PT: 19.6 sec;   INR: 1.68 ratio                   RADIOLOGY & ADDITIONAL TESTS:    Imaging Personally Reviewed:    Consultant(s) Notes Reviewed:      Care Discussed with Consultants/Other Providers:   Patient is a 89y old  Male who presents with a chief complaint of Increasing confusion, cold like symptoms, covid exposure (17 Jul 2022 08:37)    Jay Davis MD   [facility] Division of Hospital Medicine   Pager [phone]  Reachable on [employer] Teams     SUBJECTIVE / OVERNIGHT EVENTS:  Patient was interviewed with help of [employer], ID number [id number]  Very [place], unable to communicate via phone .  Following simple commands.   Reporting some pain around IV site today.    MEDICATIONS  (STANDING):  apixaban 5 milliGRAM(s) Oral every 12 hours  dexAMETHasone  Injectable 6 milliGRAM(s) IV Push daily  dextrose 5%. 1000 milliLiter(s) (100 mL/Hr) IV Continuous <Continuous>  dextrose 5%. 1000 milliLiter(s) (50 mL/Hr) IV Continuous <Continuous>  dextrose 5%. 1000 milliLiter(s) (75 mL/Hr) IV Continuous <Continuous>  dextrose 50% Injectable 25 Gram(s) IV Push once  dextrose 50% Injectable 12.5 Gram(s) IV Push once  dextrose 50% Injectable 25 Gram(s) IV Push once  donepezil 5 milliGRAM(s) Oral at bedtime  glucagon  Injectable 1 milliGRAM(s) IntraMuscular once  insulin lispro (ADMELOG) corrective regimen sliding scale   SubCutaneous every 6 hours  losartan 50 milliGRAM(s) Oral daily  melatonin 9 milliGRAM(s) Oral at bedtime  metoprolol tartrate 50 milliGRAM(s) Oral two times a day  pantoprazole    Tablet 40 milliGRAM(s) Oral before breakfast  simvastatin 20 milliGRAM(s) Oral at bedtime    MEDICATIONS  (PRN):  acetaminophen     Tablet .. 650 milliGRAM(s) Oral every 4 hours PRN Temp greater or equal to 38C (100.4F), Severe Pain (7 - 10)  ALBUTerol    90 MICROgram(s) HFA Inhaler 2 Puff(s) Inhalation every 6 hours PRN Bronchospasm  dextrose Oral Gel 15 Gram(s) Oral once PRN Blood Glucose LESS THAN 70 milliGRAM(s)/deciliter  guaiFENesin Oral Liquid (Sugar-Free) 100 milliGRAM(s) Oral every 6 hours PRN Cough  metoprolol tartrate Injectable 5 milliGRAM(s) IV Push every 6 hours PRN sustained HR over 140 bpm      Vital Signs Last 24 Hrs  T(C): 36.1 (18 Jul 2022 05:00), Max: 36.7 (17 Jul 2022 19:49)  T(F): 97 (18 Jul 2022 05:00), Max: 98.1 (17 Jul 2022 20:00)  HR: 81 (18 Jul 2022 05:00) (81 - 132)  BP: 147/85 (18 Jul 2022 05:00) (122/93 - 172/82)  BP(mean): 93 (17 Jul 2022 17:51) (93 - 93)  RR: 18 (18 Jul 2022 05:00) (18 - 18)  SpO2: 99% (18 Jul 2022 05:00) (98% - 99%)  CAPILLARY BLOOD GLUCOSE      POCT Blood Glucose.: 176 mg/dL (18 Jul 2022 06:28)  POCT Blood Glucose.: 201 mg/dL (18 Jul 2022 05:45)  POCT Blood Glucose.: 207 mg/dL (18 Jul 2022 05:27)  POCT Blood Glucose.: 201 mg/dL (18 Jul 2022 05:03)  POCT Blood Glucose.: 172 mg/dL (17 Jul 2022 23:31)  POCT Blood Glucose.: 207 mg/dL (17 Jul 2022 21:01)  POCT Blood Glucose.: 205 mg/dL (17 Jul 2022 17:58)  POCT Blood Glucose.: 180 mg/dL (17 Jul 2022 12:42)    I&O's Summary    17 Jul 2022 07:01  -  18 Jul 2022 07:00  --------------------------------------------------------  IN: 50 mL / OUT: 0 mL / NET: 50 mL        General: frail appearing elderly man laying down in bed appears comfrotable in NAD, awake and alert  Eyes:  nonicteric sclera  HENMT: NCAT, dry oral mucosa   Neck:  trachea midline   Respiratory: prominent ribs. No respiratory distress, CTABL, No rales, rhonchi, wheezing.  Cardiovascular: S1,S2; Regular rate and rhythm; No murmurs 2+ periphearal pulses in BUEs  Gastrointestinal: scaphoid abdomen, soft, Nontender, Nondistended; +BS.   Extremities: No c/c/e; cool to touch  Psych: appropriate mood and affect    LABS:                        11.3   10.75 )-----------( 228      ( 18 Jul 2022 05:10 )             35.9     07-18    151<H>  |  115<H>  |  73<H>  ----------------------------<  236<H>  4.4   |  23  |  1.31<H>    Ca    8.7      18 Jul 2022 05:10  Phos  4.9     07-18  Mg     2.20     07-18    TPro  6.5  /  Alb  3.3  /  TBili  0.8  /  DBili  0.3  /  AST  21  /  ALT  25  /  AlkPhos  85  07-18    PT/INR - ( 18 Jul 2022 01:11 )   PT: 19.6 sec;   INR: 1.68 ratio                   RADIOLOGY & ADDITIONAL TESTS:    Imaging Personally Reviewed:    Consultant(s) Notes Reviewed:      Care Discussed with Consultants/Other Providers:

## 2022-07-18 NOTE — PROGRESS NOTE ADULT - PROBLEM SELECTOR PLAN 6
- A-fib with intermittent RVR.  - Continue home apixaban.  - Continue metoprolol - increased to 50mg BID.  - On PRN IV metoprolol for sustained breakthrough A-fib with RVR.  - TropT trend of 49->42 not consistent with ACS, likely mild demand from infection.  - TTE with mild LV systolic dysfunction and stage II diastolic dysfunction.

## 2022-07-18 NOTE — DISCHARGE NOTE PROVIDER - NSDCMRMEDTOKEN_GEN_ALL_CORE_FT
donepezil 5 mg oral tablet: 1 tab(s) orally once a day (at bedtime)  Eliquis 5 mg oral tablet: 1 tab(s) orally 2 times a day  GlipiZIDE XL 10 mg oral tablet, extended release: 1 tab(s) orally once a day  losartan 50 mg oral tablet: 1 tab(s) orally once a day  Nebivolol 5 mg oral tablet: 1 tab(s) orally once a day  simvastatin 20 mg oral tablet: 1 tab(s) orally once a day (at bedtime)   albuterol 90 mcg/inh inhalation aerosol: 2 puff(s) inhaled every 6 hours, As needed, Bronchospasm  donepezil 5 mg oral tablet: 1 tab(s) orally once a day (at bedtime)  guaiFENesin 100 mg/5 mL oral liquid: 5 milliliter(s) orally every 6 hours, As needed, Cough  heparin: 5000 unit(s) subcutaneous every 12 hours  losartan 50 mg oral tablet: 1 tab(s) orally once a day  melatonin 3 mg oral tablet: 1 tab(s) orally once a day (at bedtime)  Nebivolol 5 mg oral tablet: 1 tab(s) orally once a day  simvastatin 20 mg oral tablet: 1 tab(s) orally once a day (at bedtime)   albuterol 90 mcg/inh inhalation aerosol: 2 puff(s) inhaled every 6 hours, As needed, Bronchospasm  donepezil 5 mg oral tablet: 1 tab(s) orally once a day (at bedtime)  guaiFENesin 100 mg/5 mL oral liquid: 5 milliliter(s) orally every 6 hours, As needed, Cough  losartan 50 mg oral tablet: 1 tab(s) orally once a day  melatonin 3 mg oral tablet: 1 tab(s) orally once a day (at bedtime)  Nebivolol 5 mg oral tablet: 1 tab(s) orally once a day  simvastatin 20 mg oral tablet: 1 tab(s) orally once a day (at bedtime)   albuterol 90 mcg/inh inhalation aerosol: 2 puff(s) inhaled every 6 hours, As needed, Bronchospasm  donepezil 5 mg oral tablet: 1 tab(s) orally once a day (at bedtime)  guaiFENesin 100 mg/5 mL oral liquid: 5 milliliter(s) orally every 6 hours, As needed, Cough  melatonin 3 mg oral tablet: 1 tab(s) orally once a day (at bedtime)  Nebivolol 5 mg oral tablet: 1 tab(s) orally once a day  simvastatin 20 mg oral tablet: 1 tab(s) orally once a day (at bedtime)

## 2022-07-19 DIAGNOSIS — N17.9 ACUTE KIDNEY FAILURE, UNSPECIFIED: ICD-10-CM

## 2022-07-19 LAB
ALBUMIN SERPL ELPH-MCNC: 2.8 G/DL — LOW (ref 3.3–5)
ALP SERPL-CCNC: 82 U/L — SIGNIFICANT CHANGE UP (ref 40–120)
ALT FLD-CCNC: 34 U/L — SIGNIFICANT CHANGE UP (ref 4–41)
ANION GAP SERPL CALC-SCNC: 8 MMOL/L — SIGNIFICANT CHANGE UP (ref 7–14)
AST SERPL-CCNC: 34 U/L — SIGNIFICANT CHANGE UP (ref 4–40)
BASOPHILS # BLD AUTO: 0.04 K/UL — SIGNIFICANT CHANGE UP (ref 0–0.2)
BASOPHILS NFR BLD AUTO: 0.3 % — SIGNIFICANT CHANGE UP (ref 0–2)
BILIRUB DIRECT SERPL-MCNC: 0.2 MG/DL — SIGNIFICANT CHANGE UP (ref 0–0.3)
BILIRUB INDIRECT FLD-MCNC: 0.4 MG/DL — SIGNIFICANT CHANGE UP (ref 0–1)
BILIRUB SERPL-MCNC: 0.6 MG/DL — SIGNIFICANT CHANGE UP (ref 0.2–1.2)
BUN SERPL-MCNC: 76 MG/DL — HIGH (ref 7–23)
CALCIUM SERPL-MCNC: 8.4 MG/DL — SIGNIFICANT CHANGE UP (ref 8.4–10.5)
CHLORIDE SERPL-SCNC: 110 MMOL/L — HIGH (ref 98–107)
CO2 SERPL-SCNC: 25 MMOL/L — SIGNIFICANT CHANGE UP (ref 22–31)
CREAT SERPL-MCNC: 1.48 MG/DL — HIGH (ref 0.5–1.3)
CREAT SERPL-MCNC: 1.5 MG/DL — HIGH (ref 0.5–1.3)
EGFR: 44 ML/MIN/1.73M2 — LOW
EGFR: 45 ML/MIN/1.73M2 — LOW
EOSINOPHIL # BLD AUTO: 0 K/UL — SIGNIFICANT CHANGE UP (ref 0–0.5)
EOSINOPHIL NFR BLD AUTO: 0 % — SIGNIFICANT CHANGE UP (ref 0–6)
GLUCOSE BLDC GLUCOMTR-MCNC: 241 MG/DL — HIGH (ref 70–99)
GLUCOSE BLDC GLUCOMTR-MCNC: 284 MG/DL — HIGH (ref 70–99)
GLUCOSE BLDC GLUCOMTR-MCNC: 330 MG/DL — HIGH (ref 70–99)
GLUCOSE BLDC GLUCOMTR-MCNC: 376 MG/DL — HIGH (ref 70–99)
GLUCOSE SERPL-MCNC: 263 MG/DL — HIGH (ref 70–99)
HCT VFR BLD CALC: 33.9 % — LOW (ref 39–50)
HGB BLD-MCNC: 10.7 G/DL — LOW (ref 13–17)
IANC: 10.34 K/UL — HIGH (ref 1.8–7.4)
IMM GRANULOCYTES NFR BLD AUTO: 1.8 % — HIGH (ref 0–1.5)
INR BLD: 1.58 RATIO — HIGH (ref 0.88–1.16)
LYMPHOCYTES # BLD AUTO: 0.79 K/UL — LOW (ref 1–3.3)
LYMPHOCYTES # BLD AUTO: 6.6 % — LOW (ref 13–44)
MAGNESIUM SERPL-MCNC: 2.2 MG/DL — SIGNIFICANT CHANGE UP (ref 1.6–2.6)
MCHC RBC-ENTMCNC: 31.6 GM/DL — LOW (ref 32–36)
MCHC RBC-ENTMCNC: 31.8 PG — SIGNIFICANT CHANGE UP (ref 27–34)
MCV RBC AUTO: 100.6 FL — HIGH (ref 80–100)
MONOCYTES # BLD AUTO: 0.54 K/UL — SIGNIFICANT CHANGE UP (ref 0–0.9)
MONOCYTES NFR BLD AUTO: 4.5 % — SIGNIFICANT CHANGE UP (ref 2–14)
NEUTROPHILS # BLD AUTO: 10.34 K/UL — HIGH (ref 1.8–7.4)
NEUTROPHILS NFR BLD AUTO: 86.8 % — HIGH (ref 43–77)
NRBC # BLD: 0 /100 WBCS — SIGNIFICANT CHANGE UP
NRBC # FLD: 0 K/UL — SIGNIFICANT CHANGE UP
PHOSPHATE SERPL-MCNC: 3.3 MG/DL — SIGNIFICANT CHANGE UP (ref 2.5–4.5)
PLATELET # BLD AUTO: 206 K/UL — SIGNIFICANT CHANGE UP (ref 150–400)
POTASSIUM SERPL-MCNC: 4.8 MMOL/L — SIGNIFICANT CHANGE UP (ref 3.5–5.3)
POTASSIUM SERPL-SCNC: 4.8 MMOL/L — SIGNIFICANT CHANGE UP (ref 3.5–5.3)
PROT SERPL-MCNC: 5.9 G/DL — LOW (ref 6–8.3)
PROTHROM AB SERPL-ACNC: 18.4 SEC — HIGH (ref 10.5–13.4)
RBC # BLD: 3.37 M/UL — LOW (ref 4.2–5.8)
RBC # FLD: 14.7 % — HIGH (ref 10.3–14.5)
SODIUM SERPL-SCNC: 143 MMOL/L — SIGNIFICANT CHANGE UP (ref 135–145)
WBC # BLD: 11.93 K/UL — HIGH (ref 3.8–10.5)
WBC # FLD AUTO: 11.93 K/UL — HIGH (ref 3.8–10.5)

## 2022-07-19 PROCEDURE — 99233 SBSQ HOSP IP/OBS HIGH 50: CPT

## 2022-07-19 RX ORDER — ASCORBIC ACID 60 MG
500 TABLET,CHEWABLE ORAL DAILY
Refills: 0 | Status: DISCONTINUED | OUTPATIENT
Start: 2022-07-19 | End: 2022-07-29

## 2022-07-19 RX ORDER — SODIUM CHLORIDE 9 MG/ML
1000 INJECTION, SOLUTION INTRAVENOUS
Refills: 0 | Status: DISCONTINUED | OUTPATIENT
Start: 2022-07-19 | End: 2022-07-20

## 2022-07-19 RX ORDER — APIXABAN 2.5 MG/1
2.5 TABLET, FILM COATED ORAL EVERY 12 HOURS
Refills: 0 | Status: DISCONTINUED | OUTPATIENT
Start: 2022-07-19 | End: 2022-07-22

## 2022-07-19 RX ADMIN — SODIUM CHLORIDE 75 MILLILITER(S): 9 INJECTION, SOLUTION INTRAVENOUS at 15:30

## 2022-07-19 RX ADMIN — Medication 50 MILLIGRAM(S): at 18:35

## 2022-07-19 RX ADMIN — Medication 3: at 18:35

## 2022-07-19 RX ADMIN — Medication 5: at 13:10

## 2022-07-19 RX ADMIN — SIMVASTATIN 20 MILLIGRAM(S): 20 TABLET, FILM COATED ORAL at 22:24

## 2022-07-19 RX ADMIN — Medication 6 MILLIGRAM(S): at 06:54

## 2022-07-19 RX ADMIN — Medication 9 MILLIGRAM(S): at 22:23

## 2022-07-19 RX ADMIN — DONEPEZIL HYDROCHLORIDE 5 MILLIGRAM(S): 10 TABLET, FILM COATED ORAL at 22:24

## 2022-07-19 RX ADMIN — Medication 50 MILLIGRAM(S): at 06:53

## 2022-07-19 RX ADMIN — APIXABAN 2.5 MILLIGRAM(S): 2.5 TABLET, FILM COATED ORAL at 18:35

## 2022-07-19 RX ADMIN — PANTOPRAZOLE SODIUM 40 MILLIGRAM(S): 20 TABLET, DELAYED RELEASE ORAL at 06:54

## 2022-07-19 RX ADMIN — AMLODIPINE BESYLATE 5 MILLIGRAM(S): 2.5 TABLET ORAL at 06:54

## 2022-07-19 RX ADMIN — Medication 2: at 09:16

## 2022-07-19 NOTE — PROGRESS NOTE ADULT - ASSESSMENT
89M h/o dementia (A&Ox2 at baseline, A-fib (apixaban), DM2, HTN, [place] admitted 7/11 with cough / sneezing / increased confusion after SARS-CoV-2 exposure found to have COVID-19. Course c/b intermittent tachycardia, also with hypernatremia and hyperglycemia in the setting of steroid use.

## 2022-07-19 NOTE — PROGRESS NOTE ADULT - SUBJECTIVE AND OBJECTIVE BOX
Patient is a 89y old  Male who presents with a chief complaint of Increasing confusion, cold like symptoms, covid exposure (18 Jul 2022 13:27)    Jay Davis MD   [facility] Division of Hospital Medicine   Pager [phone]  Reachable on [employer] Teams     SUBJECTIVE / OVERNIGHT EVENTS:  Patient was interviewed with help of [employer], ID number [id number]  Ate 1/2 of his breakfast and entire ensure. Encouraged sitter to have patient drink thickened liquids   Ramona, is able to follow commands   Afib to 170s yesterday afternoon, resolved spontaneously.       MEDICATIONS  (STANDING):  amLODIPine   Tablet 5 milliGRAM(s) Oral daily  apixaban 2.5 milliGRAM(s) Oral every 12 hours  dextrose 5%. 1000 milliLiter(s) (100 mL/Hr) IV Continuous <Continuous>  dextrose 5%. 1000 milliLiter(s) (50 mL/Hr) IV Continuous <Continuous>  dextrose 5%. 1000 milliLiter(s) (100 mL/Hr) IV Continuous <Continuous>  dextrose 5%. 1000 milliLiter(s) (50 mL/Hr) IV Continuous <Continuous>  dextrose 50% Injectable 25 Gram(s) IV Push once  dextrose 50% Injectable 12.5 Gram(s) IV Push once  dextrose 50% Injectable 25 Gram(s) IV Push once  dextrose 50% Injectable 25 Gram(s) IV Push once  dextrose 50% Injectable 12.5 Gram(s) IV Push once  dextrose 50% Injectable 25 Gram(s) IV Push once  donepezil 5 milliGRAM(s) Oral at bedtime  glucagon  Injectable 1 milliGRAM(s) IntraMuscular once  glucagon  Injectable 1 milliGRAM(s) IntraMuscular once  insulin lispro (ADMELOG) corrective regimen sliding scale   SubCutaneous every 6 hours  melatonin 9 milliGRAM(s) Oral at bedtime  metoprolol tartrate 50 milliGRAM(s) Oral two times a day  pantoprazole    Tablet 40 milliGRAM(s) Oral before breakfast  simvastatin 20 milliGRAM(s) Oral at bedtime    MEDICATIONS  (PRN):  acetaminophen     Tablet .. 650 milliGRAM(s) Oral every 4 hours PRN Temp greater or equal to 38C (100.4F), Severe Pain (7 - 10)  ALBUTerol    90 MICROgram(s) HFA Inhaler 2 Puff(s) Inhalation every 6 hours PRN Bronchospasm  dextrose Oral Gel 15 Gram(s) Oral once PRN Blood Glucose LESS THAN 70 milliGRAM(s)/deciliter  dextrose Oral Gel 15 Gram(s) Oral once PRN Blood Glucose LESS THAN 70 milliGRAM(s)/deciliter  guaiFENesin Oral Liquid (Sugar-Free) 100 milliGRAM(s) Oral every 6 hours PRN Cough  metoprolol tartrate Injectable 5 milliGRAM(s) IV Push every 6 hours PRN sustained HR over 140 bpm      Vital Signs Last 24 Hrs  T(C): 36.7 (19 Jul 2022 06:00), Max: 36.7 (19 Jul 2022 06:00)  T(F): 98.1 (19 Jul 2022 06:00), Max: 98.1 (19 Jul 2022 06:00)  HR: 100 (19 Jul 2022 06:00) (92 - 100)  BP: 128/62 (19 Jul 2022 06:00) (128/62 - 153/82)  BP(mean): --  RR: 18 (19 Jul 2022 06:00) (17 - 19)  SpO2: 98% (19 Jul 2022 06:00) (98% - 100%)  CAPILLARY BLOOD GLUCOSE      POCT Blood Glucose.: 376 mg/dL (19 Jul 2022 12:20)  POCT Blood Glucose.: 241 mg/dL (19 Jul 2022 08:31)  POCT Blood Glucose.: 335 mg/dL (18 Jul 2022 22:58)  POCT Blood Glucose.: 420 mg/dL (18 Jul 2022 17:47)  POCT Blood Glucose.: 410 mg/dL (18 Jul 2022 17:41)    I&O's Summary    18 Jul 2022 07:01  -  19 Jul 2022 07:00  --------------------------------------------------------  IN: 480 mL / OUT: 300 mL / NET: 180 mL        General: frail appearing elderly man laying down in bed appears comfrotable in NAD, awake and alert  Eyes:  nonicteric sclera  HENMT: dry oral mucosa   Neck:  trachea midline   Respiratory: prominent ribs. No respiratory distress, CTABL, No rales, rhonchi, wheezing.  Cardiovascular: S1,S2; Regular rate and rhythm; No murmurs   Gastrointestinal: scaphoid abdomen, soft, Nontender, Nondistended; +BS.   Extremities: No c/c/e; cool to touch  Psych: appropriate mood and affect    LABS:                        10.7   11.93 )-----------( 206      ( 19 Jul 2022 05:15 )             33.9     07-19    143  |  110<H>  |  76<H>  ----------------------------<  263<H>  4.8   |  25  |  1.48<H>    Ca    8.4      19 Jul 2022 05:15  Phos  3.3     07-19  Mg     2.20     07-19    TPro  5.9<L>  /  Alb  2.8<L>  /  TBili  0.6  /  DBili  0.2  /  AST  34  /  ALT  34  /  AlkPhos  82  07-19    PT/INR - ( 19 Jul 2022 05:15 )   PT: 18.4 sec;   INR: 1.58 ratio                   RADIOLOGY & ADDITIONAL TESTS:    Imaging Personally Reviewed:    Consultant(s) Notes Reviewed:      Care Discussed with Consultants/Other Providers:

## 2022-07-19 NOTE — PROGRESS NOTE ADULT - PROBLEM SELECTOR PLAN 9
DVT prophylaxis: therapeutic apixaban.  Diet: CC / DASH/TLC. Purred/Mod thick liquids per SLP  Dispo: pending Cr normalization,  MARSHA.  GOC: DNR/DNI. DVT prophylaxis: therapeutic apixaban.  Diet: CC / DASH/TLC. Purred/Mod thick liquids per SLP  Dispo: pending Cr normalization,  MARSHA.  GOC: DNR/DNI.    Communication: updated daughter over the phone today 7/19

## 2022-07-19 NOTE — PROGRESS NOTE ADULT - PROBLEM SELECTOR PLAN 1
- resolved with IV fluids   - likely no drinking enough, does not like thickened liquids   - continue to Encourage PO hydration.

## 2022-07-19 NOTE — PROGRESS NOTE ADULT - PROBLEM SELECTOR PLAN 6
- A-fib with intermittent RVR.  - will decrease apixaban to renally dosed 2.5 mg BID   - Continue metoprolol - increased to 50mg BID.  - On PRN IV metoprolol for sustained breakthrough A-fib with RVR.  - TropT trend of 49->42 not consistent with ACS, likely mild demand from infection.  - TTE with mild LV systolic dysfunction and stage II diastolic dysfunction.

## 2022-07-19 NOTE — CHART NOTE - NSCHARTNOTEFT_GEN_A_CORE
Source: Patient A&Ox1     Per chart review, 89M PMHx dementia (AAO x 2 at baseline), Afib on eliquis, DM2, HTN, hard of hearing who presents with cough, sneezing, and increased confusion at home after Covid exposure Wednesday July 6, found to have Covid 19.  Patient seen at bedside, appears lethargic and confused. Information obtained via PCA. Patient consumed most of his breakfast this morning. Nutrition supplement, Glucerna visibly consumed 75% on the table. Pt's appetite has been improving per nursing flowsheet, able to consume % of his meals. Patient s/p bedside swallow assessment (7/18) recommending puree with thin liquids. No chewing or swallowing difficulties reported at present. No GI distress at this time. Skin remains impaired to sacral/gluteal. Labs 7/19 notable with improving hypernatremia, Na(143N), elevated BUN/Creat 2/2 dx of TENZIN continue to encourage PO hydration.      Diet, Pureed:   DASH/TLC {Sodium & Cholesterol Restricted} (DASH)  Mildly Thick Liquids (MILDTHICKLIQS)  Supplement Feeding Modality:  Oral  Glucerna Shake Cans or Servings Per Day:  1       Frequency:  Two Times a day (07-18-22 @ 11:21)      GI: WDL. Last BM Fecal inconstance     PO intake: % [x` ]  other :    Enteral /Parenteral Nutrition:     Anthropometrics:   Weight (kg): 64.7 (07-12)  Height: 5'7 (Reported via son in law)   BMI: 22.3    Edema: None reported at this time  Pressure Injuries: None reported at this time    __________________ Pertinent Medications__________________   MEDICATIONS  (STANDING):  amLODIPine   Tablet 5 milliGRAM(s) Oral daily  apixaban 2.5 milliGRAM(s) Oral every 12 hours  dextrose 5%. 1000 milliLiter(s) (100 mL/Hr) IV Continuous <Continuous>  dextrose 5%. 1000 milliLiter(s) (50 mL/Hr) IV Continuous <Continuous>  dextrose 5%. 1000 milliLiter(s) (100 mL/Hr) IV Continuous <Continuous>  dextrose 5%. 1000 milliLiter(s) (50 mL/Hr) IV Continuous <Continuous>  dextrose 50% Injectable 25 Gram(s) IV Push once  dextrose 50% Injectable 12.5 Gram(s) IV Push once  dextrose 50% Injectable 25 Gram(s) IV Push once  dextrose 50% Injectable 25 Gram(s) IV Push once  dextrose 50% Injectable 12.5 Gram(s) IV Push once  dextrose 50% Injectable 25 Gram(s) IV Push once  donepezil 5 milliGRAM(s) Oral at bedtime  glucagon  Injectable 1 milliGRAM(s) IntraMuscular once  glucagon  Injectable 1 milliGRAM(s) IntraMuscular once  insulin lispro (ADMELOG) corrective regimen sliding scale   SubCutaneous every 6 hours  lactated ringers. 1000 milliLiter(s) (75 mL/Hr) IV Continuous <Continuous>  melatonin 9 milliGRAM(s) Oral at bedtime  metoprolol tartrate 50 milliGRAM(s) Oral two times a day  pantoprazole    Tablet 40 milliGRAM(s) Oral before breakfast  simvastatin 20 milliGRAM(s) Oral at bedtime    MEDICATIONS  (PRN):  acetaminophen     Tablet .. 650 milliGRAM(s) Oral every 4 hours PRN Temp greater or equal to 38C (100.4F), Severe Pain (7 - 10)  ALBUTerol    90 MICROgram(s) HFA Inhaler 2 Puff(s) Inhalation every 6 hours PRN Bronchospasm  dextrose Oral Gel 15 Gram(s) Oral once PRN Blood Glucose LESS THAN 70 milliGRAM(s)/deciliter  dextrose Oral Gel 15 Gram(s) Oral once PRN Blood Glucose LESS THAN 70 milliGRAM(s)/deciliter  guaiFENesin Oral Liquid (Sugar-Free) 100 milliGRAM(s) Oral every 6 hours PRN Cough  metoprolol tartrate Injectable 5 milliGRAM(s) IV Push every 6 hours PRN sustained HR over 140 bpm      __________________ Pertinent Labs__________________   07-19 Na143 mmol/L Glu 263 mg/dL<H> K+ 4.8 mmol/L Cr  1.48 mg/dL<H> BUN 76 mg/dL<H> 07-19 Phos 3.3 mg/dL 07-19 Alb 2.8 g/dL<L> 07-12 Chol 146 mg/dL LDL --    HDL 62 mg/dL Trig 84 mg/dL        POCT Blood Glucose.: 376 mg/dL (07-19-22 @ 12:20)  POCT Blood Glucose.: 241 mg/dL (07-19-22 @ 08:31)  POCT Blood Glucose.: 335 mg/dL (07-18-22 @ 22:58)  POCT Blood Glucose.: 420 mg/dL (07-18-22 @ 17:47)  POCT Blood Glucose.: 410 mg/dL (07-18-22 @ 17:41)  POCT Blood Glucose.: 245 mg/dL (07-18-22 @ 12:07)  POCT Blood Glucose.: 176 mg/dL (07-18-22 @ 06:28)  POCT Blood Glucose.: 201 mg/dL (07-18-22 @ 05:45)  POCT Blood Glucose.: 207 mg/dL (07-18-22 @ 05:27)  POCT Blood Glucose.: 201 mg/dL (07-18-22 @ 05:03)  POCT Blood Glucose.: 172 mg/dL (07-17-22 @ 23:31)  POCT Blood Glucose.: 207 mg/dL (07-17-22 @ 21:01)  POCT Blood Glucose.: 205 mg/dL (07-17-22 @ 17:58)  POCT Blood Glucose.: 180 mg/dL (07-17-22 @ 12:42)  POCT Blood Glucose.: 213 mg/dL (07-17-22 @ 08:13)  POCT Blood Glucose.: 216 mg/dL (07-17-22 @ 04:51)  POCT Blood Glucose.: 234 mg/dL (07-17-22 @ 01:20)  POCT Blood Glucose.: 340 mg/dL (07-16-22 @ 21:20)  POCT Blood Glucose.: 296 mg/dL (07-16-22 @ 17:33)          Estimated Needs:   7732-2013 yandy/d @ 30-35kcal/kgbw (64.4kg)  64.4-77.28 gm pro/d @ 1.0-1.2gm/pro/kgbw (64.4kg)     [ x] no change since previous assessment  `      Previous Nutrition Diagnosis: Malnutrition     Nutrition Diagnosis is [x ] ongoing      Recommendations:  1) Encourage PO intake and honor food preferences as able.   2) Continue monitor and document PO intake.   3) Encourage fluid intake to maintain hydration.   4) Obtain weekly weights.   5) Monitor BMP (BUN/Creat)  6) Recommend multivitamin and vitamin C for micronutrient coverage and promote skin healing.       Monitoring and Evaluation:      [ x] Tolerance to diet prescription [x ] weights [x ] follow up per protocol  [ ] other: Source: Patient A&Ox1     Per chart review, 89M PMHx dementia (AAO x 2 at baseline), Afib on eliquis, DM2, HTN, hard of hearing who presents with cough, sneezing, and increased confusion at home after Covid exposure Wednesday July 6, found to have Covid 19.  Patient seen at bedside, appears lethargic and confused. Information obtained via PCA. Patient consumed most of his breakfast this morning. Nutrition supplement, Glucerna visibly consumed 75% on the table. Pt's appetite has been improving per nursing flowsheet, able to consume % of his meals. Patient s/p bedside swallow assessment (7/18) recommending puree with thin liquids. No chewing or swallowing difficulties reported at present. No GI distress at this time. Skin remains impaired to sacral/gluteal. Labs 7/19 notable with improving hypernatremia, Na(143N), POCT(375H) elevated BUN/Creat 2/2 dx of TENZIN continue to encourage PO hydration. Will recommend Endo consult.      Diet, Pureed:   DASH/TLC {Sodium & Cholesterol Restricted} (DASH)  Mildly Thick Liquids (MILDTHICKLIQS)  Supplement Feeding Modality:  Oral  Glucerna Shake Cans or Servings Per Day:  1       Frequency:  Two Times a day (07-18-22 @ 11:21)      GI: WDL. Last BM Fecal inconstance     PO intake: % [x` ]  other :    Enteral /Parenteral Nutrition:     Anthropometrics:   Weight (kg): 64.7 (07-12)  Height: 5'7 (Reported via son in law)   BMI: 22.3    Edema: None reported at this time  Pressure Injuries: None reported at this time    __________________ Pertinent Medications__________________   MEDICATIONS  (STANDING):  amLODIPine   Tablet 5 milliGRAM(s) Oral daily  apixaban 2.5 milliGRAM(s) Oral every 12 hours  dextrose 5%. 1000 milliLiter(s) (100 mL/Hr) IV Continuous <Continuous>  dextrose 5%. 1000 milliLiter(s) (50 mL/Hr) IV Continuous <Continuous>  dextrose 5%. 1000 milliLiter(s) (100 mL/Hr) IV Continuous <Continuous>  dextrose 5%. 1000 milliLiter(s) (50 mL/Hr) IV Continuous <Continuous>  dextrose 50% Injectable 25 Gram(s) IV Push once  dextrose 50% Injectable 12.5 Gram(s) IV Push once  dextrose 50% Injectable 25 Gram(s) IV Push once  dextrose 50% Injectable 25 Gram(s) IV Push once  dextrose 50% Injectable 12.5 Gram(s) IV Push once  dextrose 50% Injectable 25 Gram(s) IV Push once  donepezil 5 milliGRAM(s) Oral at bedtime  glucagon  Injectable 1 milliGRAM(s) IntraMuscular once  glucagon  Injectable 1 milliGRAM(s) IntraMuscular once  insulin lispro (ADMELOG) corrective regimen sliding scale   SubCutaneous every 6 hours  lactated ringers. 1000 milliLiter(s) (75 mL/Hr) IV Continuous <Continuous>  melatonin 9 milliGRAM(s) Oral at bedtime  metoprolol tartrate 50 milliGRAM(s) Oral two times a day  pantoprazole    Tablet 40 milliGRAM(s) Oral before breakfast  simvastatin 20 milliGRAM(s) Oral at bedtime    MEDICATIONS  (PRN):  acetaminophen     Tablet .. 650 milliGRAM(s) Oral every 4 hours PRN Temp greater or equal to 38C (100.4F), Severe Pain (7 - 10)  ALBUTerol    90 MICROgram(s) HFA Inhaler 2 Puff(s) Inhalation every 6 hours PRN Bronchospasm  dextrose Oral Gel 15 Gram(s) Oral once PRN Blood Glucose LESS THAN 70 milliGRAM(s)/deciliter  dextrose Oral Gel 15 Gram(s) Oral once PRN Blood Glucose LESS THAN 70 milliGRAM(s)/deciliter  guaiFENesin Oral Liquid (Sugar-Free) 100 milliGRAM(s) Oral every 6 hours PRN Cough  metoprolol tartrate Injectable 5 milliGRAM(s) IV Push every 6 hours PRN sustained HR over 140 bpm      __________________ Pertinent Labs__________________   07-19 Na143 mmol/L Glu 263 mg/dL<H> K+ 4.8 mmol/L Cr  1.48 mg/dL<H> BUN 76 mg/dL<H> 07-19 Phos 3.3 mg/dL 07-19 Alb 2.8 g/dL<L> 07-12 Chol 146 mg/dL LDL --    HDL 62 mg/dL Trig 84 mg/dL        POCT Blood Glucose.: 376 mg/dL (07-19-22 @ 12:20)  POCT Blood Glucose.: 241 mg/dL (07-19-22 @ 08:31)  POCT Blood Glucose.: 335 mg/dL (07-18-22 @ 22:58)  POCT Blood Glucose.: 420 mg/dL (07-18-22 @ 17:47)  POCT Blood Glucose.: 410 mg/dL (07-18-22 @ 17:41)  POCT Blood Glucose.: 245 mg/dL (07-18-22 @ 12:07)  POCT Blood Glucose.: 176 mg/dL (07-18-22 @ 06:28)  POCT Blood Glucose.: 201 mg/dL (07-18-22 @ 05:45)  POCT Blood Glucose.: 207 mg/dL (07-18-22 @ 05:27)  POCT Blood Glucose.: 201 mg/dL (07-18-22 @ 05:03)  POCT Blood Glucose.: 172 mg/dL (07-17-22 @ 23:31)  POCT Blood Glucose.: 207 mg/dL (07-17-22 @ 21:01)  POCT Blood Glucose.: 205 mg/dL (07-17-22 @ 17:58)  POCT Blood Glucose.: 180 mg/dL (07-17-22 @ 12:42)  POCT Blood Glucose.: 213 mg/dL (07-17-22 @ 08:13)  POCT Blood Glucose.: 216 mg/dL (07-17-22 @ 04:51)  POCT Blood Glucose.: 234 mg/dL (07-17-22 @ 01:20)  POCT Blood Glucose.: 340 mg/dL (07-16-22 @ 21:20)  POCT Blood Glucose.: 296 mg/dL (07-16-22 @ 17:33)          Estimated Needs:   0724-7108 yandy/d @ 30-35kcal/kgbw (64.4kg)  64.4-77.28 gm pro/d @ 1.0-1.2gm/pro/kgbw (64.4kg)     [ x] no change since previous assessment  `      Previous Nutrition Diagnosis: Malnutrition     Nutrition Diagnosis is [x ] ongoing      Recommendations:  1) Encourage PO intake and honor food preferences as able.   2) Continue monitor and document PO intake.   3) Encourage fluid intake to maintain hydration.   4) Obtain weekly weights.   5) Monitor BMP (BUN/Creat), POCT and adjust insulin as needed.   6) Recommend multivitamin and vitamin C for micronutrient coverage and promote skin healing.   7) Recommend Endo consult given high POCT in 376 (7/19).      Monitoring and Evaluation:      [ x] Tolerance to diet prescription [x ] weights [x ] follow up per protocol  [ ] other:

## 2022-07-20 LAB
ANION GAP SERPL CALC-SCNC: 10 MMOL/L — SIGNIFICANT CHANGE UP (ref 7–14)
ANION GAP SERPL CALC-SCNC: 10 MMOL/L — SIGNIFICANT CHANGE UP (ref 7–14)
ANION GAP SERPL CALC-SCNC: 9 MMOL/L — SIGNIFICANT CHANGE UP (ref 7–14)
B-OH-BUTYR SERPL-SCNC: <0 MMOL/L — SIGNIFICANT CHANGE UP (ref 0–0.4)
BASOPHILS # BLD AUTO: 0.03 K/UL — SIGNIFICANT CHANGE UP (ref 0–0.2)
BASOPHILS NFR BLD AUTO: 0.2 % — SIGNIFICANT CHANGE UP (ref 0–2)
BLOOD GAS VENOUS COMPREHENSIVE RESULT: SIGNIFICANT CHANGE UP
BUN SERPL-MCNC: 69 MG/DL — HIGH (ref 7–23)
BUN SERPL-MCNC: 70 MG/DL — HIGH (ref 7–23)
BUN SERPL-MCNC: 73 MG/DL — HIGH (ref 7–23)
CALCIUM SERPL-MCNC: 8.3 MG/DL — LOW (ref 8.4–10.5)
CALCIUM SERPL-MCNC: 8.5 MG/DL — SIGNIFICANT CHANGE UP (ref 8.4–10.5)
CALCIUM SERPL-MCNC: 8.5 MG/DL — SIGNIFICANT CHANGE UP (ref 8.4–10.5)
CHLORIDE SERPL-SCNC: 108 MMOL/L — HIGH (ref 98–107)
CHLORIDE SERPL-SCNC: 110 MMOL/L — HIGH (ref 98–107)
CHLORIDE SERPL-SCNC: 110 MMOL/L — HIGH (ref 98–107)
CO2 SERPL-SCNC: 24 MMOL/L — SIGNIFICANT CHANGE UP (ref 22–31)
CO2 SERPL-SCNC: 24 MMOL/L — SIGNIFICANT CHANGE UP (ref 22–31)
CO2 SERPL-SCNC: 25 MMOL/L — SIGNIFICANT CHANGE UP (ref 22–31)
CREAT SERPL-MCNC: 1.19 MG/DL — SIGNIFICANT CHANGE UP (ref 0.5–1.3)
CREAT SERPL-MCNC: 1.34 MG/DL — HIGH (ref 0.5–1.3)
CREAT SERPL-MCNC: 1.36 MG/DL — HIGH (ref 0.5–1.3)
EGFR: 50 ML/MIN/1.73M2 — LOW
EGFR: 51 ML/MIN/1.73M2 — LOW
EGFR: 58 ML/MIN/1.73M2 — LOW
EOSINOPHIL # BLD AUTO: 0 K/UL — SIGNIFICANT CHANGE UP (ref 0–0.5)
EOSINOPHIL NFR BLD AUTO: 0 % — SIGNIFICANT CHANGE UP (ref 0–6)
GLUCOSE BLDC GLUCOMTR-MCNC: 216 MG/DL — HIGH (ref 70–99)
GLUCOSE BLDC GLUCOMTR-MCNC: 248 MG/DL — HIGH (ref 70–99)
GLUCOSE BLDC GLUCOMTR-MCNC: 404 MG/DL — HIGH (ref 70–99)
GLUCOSE BLDC GLUCOMTR-MCNC: 419 MG/DL — HIGH (ref 70–99)
GLUCOSE BLDC GLUCOMTR-MCNC: 422 MG/DL — HIGH (ref 70–99)
GLUCOSE BLDC GLUCOMTR-MCNC: 426 MG/DL — HIGH (ref 70–99)
GLUCOSE BLDC GLUCOMTR-MCNC: 426 MG/DL — HIGH (ref 70–99)
GLUCOSE BLDC GLUCOMTR-MCNC: 436 MG/DL — HIGH (ref 70–99)
GLUCOSE BLDC GLUCOMTR-MCNC: 447 MG/DL — HIGH (ref 70–99)
GLUCOSE SERPL-MCNC: 215 MG/DL — HIGH (ref 70–99)
GLUCOSE SERPL-MCNC: 446 MG/DL — HIGH (ref 70–99)
GLUCOSE SERPL-MCNC: 514 MG/DL — CRITICAL HIGH (ref 70–99)
HCT VFR BLD CALC: 34 % — LOW (ref 39–50)
HGB BLD-MCNC: 10.8 G/DL — LOW (ref 13–17)
IANC: 10.66 K/UL — HIGH (ref 1.8–7.4)
IMM GRANULOCYTES NFR BLD AUTO: 1.6 % — HIGH (ref 0–1.5)
LYMPHOCYTES # BLD AUTO: 0.67 K/UL — LOW (ref 1–3.3)
LYMPHOCYTES # BLD AUTO: 5.6 % — LOW (ref 13–44)
MAGNESIUM SERPL-MCNC: 2.3 MG/DL — SIGNIFICANT CHANGE UP (ref 1.6–2.6)
MAGNESIUM SERPL-MCNC: 2.3 MG/DL — SIGNIFICANT CHANGE UP (ref 1.6–2.6)
MCHC RBC-ENTMCNC: 31.8 GM/DL — LOW (ref 32–36)
MCHC RBC-ENTMCNC: 32.3 PG — SIGNIFICANT CHANGE UP (ref 27–34)
MCV RBC AUTO: 101.8 FL — HIGH (ref 80–100)
MONOCYTES # BLD AUTO: 0.49 K/UL — SIGNIFICANT CHANGE UP (ref 0–0.9)
MONOCYTES NFR BLD AUTO: 4.1 % — SIGNIFICANT CHANGE UP (ref 2–14)
NEUTROPHILS # BLD AUTO: 10.66 K/UL — HIGH (ref 1.8–7.4)
NEUTROPHILS NFR BLD AUTO: 88.5 % — HIGH (ref 43–77)
NRBC # BLD: 0 /100 WBCS — SIGNIFICANT CHANGE UP
NRBC # FLD: 0 K/UL — SIGNIFICANT CHANGE UP
PHOSPHATE SERPL-MCNC: 3 MG/DL — SIGNIFICANT CHANGE UP (ref 2.5–4.5)
PHOSPHATE SERPL-MCNC: 3 MG/DL — SIGNIFICANT CHANGE UP (ref 2.5–4.5)
PLATELET # BLD AUTO: 205 K/UL — SIGNIFICANT CHANGE UP (ref 150–400)
POTASSIUM SERPL-MCNC: 4.8 MMOL/L — SIGNIFICANT CHANGE UP (ref 3.5–5.3)
POTASSIUM SERPL-MCNC: 5 MMOL/L — SIGNIFICANT CHANGE UP (ref 3.5–5.3)
POTASSIUM SERPL-MCNC: 5.5 MMOL/L — HIGH (ref 3.5–5.3)
POTASSIUM SERPL-SCNC: 4.8 MMOL/L — SIGNIFICANT CHANGE UP (ref 3.5–5.3)
POTASSIUM SERPL-SCNC: 5 MMOL/L — SIGNIFICANT CHANGE UP (ref 3.5–5.3)
POTASSIUM SERPL-SCNC: 5.5 MMOL/L — HIGH (ref 3.5–5.3)
RBC # BLD: 3.34 M/UL — LOW (ref 4.2–5.8)
RBC # FLD: 14.4 % — SIGNIFICANT CHANGE UP (ref 10.3–14.5)
SODIUM SERPL-SCNC: 142 MMOL/L — SIGNIFICANT CHANGE UP (ref 135–145)
SODIUM SERPL-SCNC: 144 MMOL/L — SIGNIFICANT CHANGE UP (ref 135–145)
SODIUM SERPL-SCNC: 144 MMOL/L — SIGNIFICANT CHANGE UP (ref 135–145)
WBC # BLD: 12.04 K/UL — HIGH (ref 3.8–10.5)
WBC # FLD AUTO: 12.04 K/UL — HIGH (ref 3.8–10.5)

## 2022-07-20 PROCEDURE — 99233 SBSQ HOSP IP/OBS HIGH 50: CPT

## 2022-07-20 PROCEDURE — 93010 ELECTROCARDIOGRAM REPORT: CPT

## 2022-07-20 RX ORDER — INSULIN LISPRO 100/ML
VIAL (ML) SUBCUTANEOUS EVERY 4 HOURS
Refills: 0 | Status: DISCONTINUED | OUTPATIENT
Start: 2022-07-20 | End: 2022-07-21

## 2022-07-20 RX ORDER — INSULIN GLARGINE 100 [IU]/ML
12 INJECTION, SOLUTION SUBCUTANEOUS ONCE
Refills: 0 | Status: COMPLETED | OUTPATIENT
Start: 2022-07-20 | End: 2022-07-20

## 2022-07-20 RX ADMIN — Medication 2: at 07:33

## 2022-07-20 RX ADMIN — Medication 4: at 00:03

## 2022-07-20 RX ADMIN — PANTOPRAZOLE SODIUM 40 MILLIGRAM(S): 20 TABLET, DELAYED RELEASE ORAL at 06:34

## 2022-07-20 RX ADMIN — Medication 2: at 13:11

## 2022-07-20 RX ADMIN — Medication 500 MILLIGRAM(S): at 13:13

## 2022-07-20 RX ADMIN — Medication 50 MILLIGRAM(S): at 18:54

## 2022-07-20 RX ADMIN — Medication 6: at 18:53

## 2022-07-20 RX ADMIN — Medication 6: at 22:59

## 2022-07-20 RX ADMIN — SIMVASTATIN 20 MILLIGRAM(S): 20 TABLET, FILM COATED ORAL at 23:03

## 2022-07-20 RX ADMIN — Medication 9 MILLIGRAM(S): at 23:02

## 2022-07-20 RX ADMIN — APIXABAN 2.5 MILLIGRAM(S): 2.5 TABLET, FILM COATED ORAL at 06:35

## 2022-07-20 RX ADMIN — Medication 1 TABLET(S): at 13:13

## 2022-07-20 RX ADMIN — Medication 50 MILLIGRAM(S): at 06:35

## 2022-07-20 RX ADMIN — DONEPEZIL HYDROCHLORIDE 5 MILLIGRAM(S): 10 TABLET, FILM COATED ORAL at 23:03

## 2022-07-20 RX ADMIN — AMLODIPINE BESYLATE 5 MILLIGRAM(S): 2.5 TABLET ORAL at 06:34

## 2022-07-20 RX ADMIN — APIXABAN 2.5 MILLIGRAM(S): 2.5 TABLET, FILM COATED ORAL at 18:54

## 2022-07-20 RX ADMIN — INSULIN GLARGINE 12 UNIT(S): 100 INJECTION, SOLUTION SUBCUTANEOUS at 23:00

## 2022-07-20 NOTE — PROVIDER CONTACT NOTE (OTHER) - SITUATION
Patient with shortness of breath
blood glucose 447
Blood glucose 410 & 420 repeated; Pt. eating pureed diet & is on dextrose gtt
Patient HR sustaining in 140's afib RVR
Patient failed dysphagia screen
Patient temp elevated with increased respirations
Patient noted with no urine output since 1700, 7 hours
Patient has not urinated during shift
patient hypertensive
pt HR went up to 150, then went back down to 110s-120s sustaining.
Blood Glucose 402
Patient HR increased to 160 Afib RVR with increased respirations
Patient O2 dropped to 80% on 2L NC
Patient bladder scan 0cc
Pt pre dinner 
HR in 130's on tele, nonsustaining, pt. asymptomatic.
Patient repeat rectal temp 101
fingerstick first 404  repeat 436
patient hypertensive
HR continues to fluctuate bt. 120's - 150's on tele.
HR reached 170 on tele; now sustaining 140's/150's. Patient is resting comfortably in bed
HR: 158 on tele, pt. asymptomatic
Patient HR increased to 160 afib RVR nonsustain
Patient HR sustaining afib 's
Patient repeat rectal 100.3 and RR 28`
Patient was tachycardic. HR increased to 195 bpm
Patient with increased lethargy
Patient with increased temperature
Patient's HR was sustaining at 170 bpm and was at 190s for one second
Patient's temp was 100.5 F and HR was sustaining at 150s, increased to 180 bpm.

## 2022-07-20 NOTE — CHART NOTE - NSCHARTNOTEFT_GEN_A_CORE
Called by primary team regarding hyperglycemia /w FSG in the 400s today. Per primary team, patient is 88 yo M /w PMH of DM2 here with covid. Completed 3d of dexamethasone yesterday. FSG have trended high since then. BMP from 5PM today shows AG 10, CO2 24, glucose 524. He has received some correctional insulin without improvement    Assessment: steroid induced hyperglycemia  Recommendations:   Start lantus 12 units tonight (0.4u/kg)  low correctional scale every 4-6 hours    Full consult in AM    Robbin Gilliam MD  Endocrine Fellow  Can be reached via teams. For follow up questions, discharge recommendations, or new consults, please call answering service at [phone] (weekdays); [phone] (nights/weekends)

## 2022-07-20 NOTE — PROVIDER CONTACT NOTE (OTHER) - REASON
HR continues to fluctuate bt. 120's - 150's on tele.
hypertension
Patient noted with no urine output since 1700, 7 hours
hypertension
Patient repeat rectal temp 101
holding insulin before dinner
HR in 130's on tele, nonsustaining, pt. asymptomatic.
Hyperglycemic > 400
Patient O2 dropped to 80% on 2L NC
Patient bladder scan 0cc
Patient with shortness of breath
HR reached 170 on tele
HR: 158 on tele, pt. asymptomatic
Hyperglycemic > 400
Patient HR increased to 160 afib RVR nonsustain
Patient HR sustaining afib 's
Patient repeat rectal 100.3 and RR 28`
Patient temp elevated with increased respirations
Patient was tachycardic. HR increased to 195 bpm
Patient with increased lethargy
Patient with increased temperature
Patient's HR was sustaining at 170 bpm and was at 190s for one second
Patient's temp was 100.5 F and HR was sustaining at 150s, increased to 180 bpm
Patient HR sustaining in 140's afib RVR
Patient failed dysphagia screen
blood glucose 447
Blood Glucose 402
Patient HR increased to 160 Afib RVR with increased respirations
Patient has not urinated during shift
pt HR went up to 150

## 2022-07-20 NOTE — PROVIDER CONTACT NOTE (OTHER) - ACTION/TREATMENT ORDERED:
As per PA, administer PO metoprolol now
As per PA, continue with fluids running at 75mL/hr, encourage PO hydration and f/u creatinine in AM
As per PA, temperature to be taken rectally. Rectal temp: 98.9 F. Will continue to monitor HR and as per PA, will notify PA if HR begins to sustain in the 120's.
Bladder scan ordered, upon entering pt's room a large amount of urine output was noted on abdoulaye. Bladder scan showed 175 ml. Will continue to monitor pt at this time.
STAT BMP, BMB, VBG and lantus 12 units
As per PA, continue to monitor temp and administer PRN tylenol q6 as ordered
As per PA, continue with supportive cooling measures at this time and continue with PRN tylenol q4 hours
OK to give IV metoprolol for HR >140's as per eMAR; will continue to monitor HR on tele
PA Notified, pending further orders
As per PA, bladder scan patient
As per PA, no interventions at this time. Will continue to monitor pt.
Per PA, hold sliding scale prior to dinner as pt received 18 units this afternoon and is currently NPO on LR. Will reassess giving insulin at bedtime. Pre-Meal insulin to be held as pt is NPO.
As per PA at bedside, continue to monitor on 2L NC and wean as tolerated
As per PA, administer PO tylenol now and reassess vitals in 1 hour
As per PA, administer PRN tylenol now q4 hours
As per PA, continue to monitor at this time
As per PA, will come to bedside to assess
PA at bedside, as per PA, give scheduled PO lopressor now, increase O2 to 4L and have patient remain on 4L for time being. As per PA, will order chest x-ray
PA made aware. Tylenol  mg given 2.5 mg Lopressor IVP.
PA made aware. Tylenol to give q4h for temp. If HR sustains more than 140 bpm
PA made aware. lopressor prn order of 5 mg was given IVP.
acp aware  due for sliding scale insulin
LIBBY Nieto at bedside to assess pt. No new orders at this time, pt due to get metoprolol at 1800.
as per ACP, recheck in one hour
As per PA, okay to give Metroprolol now. Medication given, HR improved- sustaining 100's-110's.
Insulin premeal and sliding scale given as per orders. Recheck FS an hour later shown to be 424, pt made NPO. PA at bedside to assess pt.
haldol 1mg IVP given, as per ACP reassess BP in one hour
As per PA at bedside, give PRN tylenol now and will reorder IVP lopressor for HR
As per PA, administer PRN lopressor now
Keep fluids running; give sliding scale insulin. Continue to monitor

## 2022-07-20 NOTE — CHART NOTE - NSCHARTNOTEFT_GEN_A_CORE
89M PMHx dementia (AAO x 2 at baseline), Afib on eliquis, DM, HTN, hard of hearing who presents with cough, sneezing, and increased confusion at home after Covid exposure Wednesday July 6, found to have Covid 19.    On sign out was advised patient hyperglycemic >400. Given 6 U of correctional scale by day provider. Upon rechecking glucose 436--->447. Ordered DKA labs: BHB, VBG, and BMP. Elevated glucose levels over the course of admission likely steroid induced. Discussed with Endocrinology fellow,  who advised starting the patient on 12 U of lantus and sliding scale q 6 hours. RN updated on plan. Formal Endocrinology consult to follow tomorrow. Day team to be updated on overnight events to ensure continuity of care. 89M PMHx dementia (AAO x 2 at baseline), Afib on eliquis, DM, HTN, hard of hearing who presents with cough, sneezing, and increased confusion at home after Covid exposure Wednesday July 6, found to have Covid 19.    On sign out was advised patient hyperglycemic >400. Given 6 U of correctional scale by day provider. Upon rechecking glucose 436--->447. Ordered DKA labs: BHB, VBG, and BMP. Elevated glucose levels over the course of admission likely steroid induced. Discussed with Endocrinology fellow,  who advised starting the patient on 12 U of lantus and sliding scale q 4 hours. RN updated on plan. Formal Endocrinology consult to follow tomorrow. Day team to be updated on overnight events to ensure continuity of care.    BHB, anion gap (-). Patient slightly acidotic will repeat VBG in AM. Discussed with hospitalist,  who agreed to plan above.

## 2022-07-20 NOTE — PROGRESS NOTE ADULT - PROBLEM SELECTOR PLAN 2
- Scr of 1.5 from baseline of 0.9  - likely secondary to poor PO intake  - will continue to encourage PO water intake.   - start on gentle LR   - Continue to monitor BMP - likely secondary to poor PO intake, resolved with IVF  - continue to encourage PO water intake.   - Continue to monitor BMP

## 2022-07-20 NOTE — PROGRESS NOTE ADULT - PROBLEM SELECTOR PLAN 9
DVT prophylaxis: therapeutic apixaban.  Diet: CC / DASH/TLC. Purred/Mod thick liquids per SLP  Dispo: pending Cr normalization,  MARSHA.  GOC: DNR/DNI.    Communication: updated daughter over the phone today 7/19 DVT prophylaxis: therapeutic apixaban.  Diet: CC / DASH/TLC. Purred/Mod thick liquids per SLP  Dispo: pending Cr normalization,  MARSHA.  GOC: DNR/DNI.    Communication: updated daughter Maddie over the phone 7/20

## 2022-07-20 NOTE — PROGRESS NOTE ADULT - SUBJECTIVE AND OBJECTIVE BOX
Patient is a 89y old  Male who presents with a chief complaint of COVID (19 Jul 2022 13:12)    Jay Davis MD   [facility]   Pager [phone]  Reachable on [employer] Teams     SUBJECTIVE / OVERNIGHT EVENTS:  Incomplete. Patient seen and examined today.    MEDICATIONS  (STANDING):  amLODIPine   Tablet 5 milliGRAM(s) Oral daily  apixaban 2.5 milliGRAM(s) Oral every 12 hours  ascorbic acid 500 milliGRAM(s) Oral daily  dextrose 5%. 1000 milliLiter(s) (50 mL/Hr) IV Continuous <Continuous>  dextrose 5%. 1000 milliLiter(s) (100 mL/Hr) IV Continuous <Continuous>  dextrose 5%. 1000 milliLiter(s) (100 mL/Hr) IV Continuous <Continuous>  dextrose 5%. 1000 milliLiter(s) (50 mL/Hr) IV Continuous <Continuous>  dextrose 50% Injectable 25 Gram(s) IV Push once  dextrose 50% Injectable 12.5 Gram(s) IV Push once  dextrose 50% Injectable 25 Gram(s) IV Push once  dextrose 50% Injectable 25 Gram(s) IV Push once  dextrose 50% Injectable 12.5 Gram(s) IV Push once  dextrose 50% Injectable 25 Gram(s) IV Push once  donepezil 5 milliGRAM(s) Oral at bedtime  glucagon  Injectable 1 milliGRAM(s) IntraMuscular once  glucagon  Injectable 1 milliGRAM(s) IntraMuscular once  insulin lispro (ADMELOG) corrective regimen sliding scale   SubCutaneous every 6 hours  melatonin 9 milliGRAM(s) Oral at bedtime  metoprolol tartrate 50 milliGRAM(s) Oral two times a day  multivitamin 1 Tablet(s) Oral daily  pantoprazole    Tablet 40 milliGRAM(s) Oral before breakfast  simvastatin 20 milliGRAM(s) Oral at bedtime    MEDICATIONS  (PRN):  acetaminophen     Tablet .. 650 milliGRAM(s) Oral every 4 hours PRN Temp greater or equal to 38C (100.4F), Severe Pain (7 - 10)  ALBUTerol    90 MICROgram(s) HFA Inhaler 2 Puff(s) Inhalation every 6 hours PRN Bronchospasm  dextrose Oral Gel 15 Gram(s) Oral once PRN Blood Glucose LESS THAN 70 milliGRAM(s)/deciliter  dextrose Oral Gel 15 Gram(s) Oral once PRN Blood Glucose LESS THAN 70 milliGRAM(s)/deciliter  guaiFENesin Oral Liquid (Sugar-Free) 100 milliGRAM(s) Oral every 6 hours PRN Cough  metoprolol tartrate Injectable 5 milliGRAM(s) IV Push every 6 hours PRN sustained HR over 140 bpm      Vital Signs Last 24 Hrs  T(C): 36.7 (20 Jul 2022 06:30), Max: 36.8 (19 Jul 2022 17:54)  T(F): 98 (20 Jul 2022 06:30), Max: 98.3 (19 Jul 2022 17:54)  HR: 94 (20 Jul 2022 06:30) (83 - 109)  BP: 124/60 (20 Jul 2022 06:30) (106/80 - 124/60)  BP(mean): --  RR: 16 (20 Jul 2022 06:30) (16 - 18)  SpO2: 97% (20 Jul 2022 06:30) (97% - 100%)  CAPILLARY BLOOD GLUCOSE      POCT Blood Glucose.: 216 mg/dL (20 Jul 2022 07:17)  POCT Blood Glucose.: 330 mg/dL (19 Jul 2022 23:21)  POCT Blood Glucose.: 284 mg/dL (19 Jul 2022 17:38)  POCT Blood Glucose.: 376 mg/dL (19 Jul 2022 12:20)    I&O's Summary    19 Jul 2022 07:01  -  20 Jul 2022 07:00  --------------------------------------------------------  IN: 400 mL / OUT: 400 mL / NET: 0 mL        General: frail appearing elderly man laying down in bed appears comfrotable in NAD, awake and alert  Eyes:  nonicteric sclera  HENMT: dry oral mucosa   Neck:  trachea midline   Respiratory: prominent ribs. No respiratory distress, CTABL, No rales, rhonchi, wheezing.  Cardiovascular: S1,S2; Regular rate and rhythm; No murmurs   Gastrointestinal: scaphoid abdomen, soft, Nontender, Nondistended; +BS.   Extremities: No c/c/e; cool to touch  Psych: appropriate mood and affect      LABS:                        10.8   12.04 )-----------( 205      ( 20 Jul 2022 06:32 )             34.0     07-20    144  |  110<H>  |  69<H>  ----------------------------<  215<H>  5.5<H>   |  25  |  1.19    Ca    8.5      20 Jul 2022 06:32  Phos  3.0     07-20  Mg     2.30     07-20    TPro  5.9<L>  /  Alb  2.8<L>  /  TBili  0.6  /  DBili  0.2  /  AST  34  /  ALT  34  /  AlkPhos  82  07-19    PT/INR - ( 19 Jul 2022 05:15 )   PT: 18.4 sec;   INR: 1.58 ratio                   RADIOLOGY & ADDITIONAL TESTS:    Imaging Personally Reviewed:    Consultant(s) Notes Reviewed:      Care Discussed with Consultants/Other Providers:   Patient is a 89y old  Male who presents with a chief complaint of COVID (19 Jul 2022 13:12)    Jay Davis MD   [facility] Medicine   Pager [phone]  Reachable on Microsoft Teams     SUBJECTIVE / OVERNIGHT EVENTS:  Patient was interviewed with help of [employer], ID number [id number]  very [place] difficult to obtain history   Patient ate 75% of his food today and entire ensure.   He is denying any pain.      MEDICATIONS  (STANDING):  amLODIPine   Tablet 5 milliGRAM(s) Oral daily  apixaban 2.5 milliGRAM(s) Oral every 12 hours  ascorbic acid 500 milliGRAM(s) Oral daily  dextrose 5%. 1000 milliLiter(s) (50 mL/Hr) IV Continuous <Continuous>  dextrose 5%. 1000 milliLiter(s) (100 mL/Hr) IV Continuous <Continuous>  dextrose 5%. 1000 milliLiter(s) (100 mL/Hr) IV Continuous <Continuous>  dextrose 5%. 1000 milliLiter(s) (50 mL/Hr) IV Continuous <Continuous>  dextrose 50% Injectable 25 Gram(s) IV Push once  dextrose 50% Injectable 12.5 Gram(s) IV Push once  dextrose 50% Injectable 25 Gram(s) IV Push once  dextrose 50% Injectable 25 Gram(s) IV Push once  dextrose 50% Injectable 12.5 Gram(s) IV Push once  dextrose 50% Injectable 25 Gram(s) IV Push once  donepezil 5 milliGRAM(s) Oral at bedtime  glucagon  Injectable 1 milliGRAM(s) IntraMuscular once  glucagon  Injectable 1 milliGRAM(s) IntraMuscular once  insulin lispro (ADMELOG) corrective regimen sliding scale   SubCutaneous every 6 hours  melatonin 9 milliGRAM(s) Oral at bedtime  metoprolol tartrate 50 milliGRAM(s) Oral two times a day  multivitamin 1 Tablet(s) Oral daily  pantoprazole    Tablet 40 milliGRAM(s) Oral before breakfast  simvastatin 20 milliGRAM(s) Oral at bedtime    MEDICATIONS  (PRN):  acetaminophen     Tablet .. 650 milliGRAM(s) Oral every 4 hours PRN Temp greater or equal to 38C (100.4F), Severe Pain (7 - 10)  ALBUTerol    90 MICROgram(s) HFA Inhaler 2 Puff(s) Inhalation every 6 hours PRN Bronchospasm  dextrose Oral Gel 15 Gram(s) Oral once PRN Blood Glucose LESS THAN 70 milliGRAM(s)/deciliter  dextrose Oral Gel 15 Gram(s) Oral once PRN Blood Glucose LESS THAN 70 milliGRAM(s)/deciliter  guaiFENesin Oral Liquid (Sugar-Free) 100 milliGRAM(s) Oral every 6 hours PRN Cough  metoprolol tartrate Injectable 5 milliGRAM(s) IV Push every 6 hours PRN sustained HR over 140 bpm      Vital Signs Last 24 Hrs  T(C): 36.7 (20 Jul 2022 06:30), Max: 36.8 (19 Jul 2022 17:54)  T(F): 98 (20 Jul 2022 06:30), Max: 98.3 (19 Jul 2022 17:54)  HR: 94 (20 Jul 2022 06:30) (83 - 109)  BP: 124/60 (20 Jul 2022 06:30) (106/80 - 124/60)  BP(mean): --  RR: 16 (20 Jul 2022 06:30) (16 - 18)  SpO2: 97% (20 Jul 2022 06:30) (97% - 100%)  CAPILLARY BLOOD GLUCOSE      POCT Blood Glucose.: 216 mg/dL (20 Jul 2022 07:17)  POCT Blood Glucose.: 330 mg/dL (19 Jul 2022 23:21)  POCT Blood Glucose.: 284 mg/dL (19 Jul 2022 17:38)  POCT Blood Glucose.: 376 mg/dL (19 Jul 2022 12:20)    I&O's Summary    19 Jul 2022 07:01  -  20 Jul 2022 07:00  --------------------------------------------------------  IN: 400 mL / OUT: 400 mL / NET: 0 mL        General: frail appearing elderly man laying down in bed appears comfortable in NAD, awake and alert  Eyes:  nonicteric sclera  HENMT: dry oral mucosa   Neck:  trachea midline   Respiratory: prominent ribs. No respiratory distress, CTABL, No rales, rhonchi, wheezing.  Cardiovascular: S1,S2; Regular rate and rhythm; No murmurs   Gastrointestinal: scaphoid abdomen, soft, Nontender, Nondistended; +BS.   Extremities: No c/c/e; cool to touch  Psych: appropriate mood and affect      LABS:                        10.8   12.04 )-----------( 205      ( 20 Jul 2022 06:32 )             34.0     07-20    144  |  110<H>  |  69<H>  ----------------------------<  215<H>  5.5<H>   |  25  |  1.19    Ca    8.5      20 Jul 2022 06:32  Phos  3.0     07-20  Mg     2.30     07-20    TPro  5.9<L>  /  Alb  2.8<L>  /  TBili  0.6  /  DBili  0.2  /  AST  34  /  ALT  34  /  AlkPhos  82  07-19    PT/INR - ( 19 Jul 2022 05:15 )   PT: 18.4 sec;   INR: 1.58 ratio                   RADIOLOGY & ADDITIONAL TESTS:    Imaging Personally Reviewed:    Consultant(s) Notes Reviewed:      Care Discussed with Consultants/Other Providers:

## 2022-07-20 NOTE — PROVIDER CONTACT NOTE (OTHER) - NAME OF MD/NP/PA/DO NOTIFIED:
ALEKSANDER Longo
LIBBY Kaufman
LIBBY Rubio
LIBBY Rubio
LIBBY Toussaint
ACP Ariel
LIBBY Ambriz
LIBBY Hartmanfig
LIBBY Rubio
LIBBY Rubio
LIBBY Irizarry, [employer]
LIBBY Limon
LIBBY Limon
LIBBY Pina
LIBBY Pina
LIBBY Rubio
LIBBY Toussaint
PA Thegolden Irizarry
PA Thegolden Irizrary
[facility] [id number]
vaishali olmos
Emilia Toussaint
LIBBY Rubio
Anne LEAL)
LIBBY Rubio
LIBBY Rubio
LIBYB Ambriz
ALEKSANDER Longo
LIBBY Rubio
LIBBY Ambriz

## 2022-07-20 NOTE — PROGRESS NOTE ADULT - PROBLEM SELECTOR PLAN 7
- Continue standing insulin while patient remains hyperglycemic in the setting of steroids.  - Plan to discontinue standing insulin after completion of steroids.  - ISS.  - FS qAC/HS.  - Hold oral glycemic control agents. - A1c is 6.1  - poorly controlled, secondary to steroid induced hyperglycemia  - dexamethasone discontinued 7/19, lantus discontinued   - ISS  - FS qAC/HS.  - Hold oral glycemic control agents.

## 2022-07-20 NOTE — PROGRESS NOTE ADULT - PROBLEM SELECTOR PLAN 8
- holding losartan given TENZIN  - will start amlodipine 5mg for now BP controlled   - holding losartan given TENZIN  - will c/w amlodipine 5mg for now, consider dose reduction to 2.5 if BP is soft

## 2022-07-20 NOTE — PROGRESS NOTE ADULT - NSPROGADDITIONALINFOA_GEN_ALL_CORE
# Hyperkalemia - possibly seoncdary to LR, will discontinue, obtain EKG and repeat BMP this afternoon. If changes on BMP or remains elevated will start NS and lokelma

## 2022-07-20 NOTE — PROGRESS NOTE ADULT - PROBLEM SELECTOR PLAN 1
- resolved with IV fluids   - likely no drinking enough, does not like thickened liquids   - continue to Encourage PO hydration. - resolved with IV fluids   - continue to Encourage PO hydration.

## 2022-07-20 NOTE — PROGRESS NOTE ADULT - ASSESSMENT
89M h/o dementia (A&Ox2 at baseline, A-fib (apixaban), DM2, HTN, [place] admitted 7/11 with cough / sneezing / increased confusion after SARS-CoV-2 exposure found to have COVID-19. Course c/b intermittent tachycardia, also with hypernatremia and hyperglycemia in the setting of steroid use.

## 2022-07-20 NOTE — PROGRESS NOTE ADULT - PROBLEM SELECTOR PLAN 6
- A-fib with intermittent RVR.  - will decrease apixaban to renally dosed 2.5 mg BID   - Continue metoprolol - increased to 50mg BID.  - On PRN IV metoprolol for sustained breakthrough A-fib with RVR.  - TropT trend of 49->42 not consistent with ACS, likely mild demand from infection.  - TTE with mild LV systolic dysfunction and stage II diastolic dysfunction. - A-fib with intermittent RVR.  - c/w renally dosed apixaban 2.5 mg BID (on 5mg at home)  - Continue metoprolol - increased to 50mg BID.  - On PRN IV metoprolol for sustained breakthrough A-fib with RVR.  - TropT trend of 49->42 not consistent with ACS, likely mild demand from infection.  - TTE with mild LV systolic dysfunction and stage II diastolic dysfunction.

## 2022-07-20 NOTE — CHART NOTE - NSCHARTNOTEFT_GEN_A_CORE
Pt noted with hyperkalemia, 5.5, Pt is asymptomatic. EKG unchanged from previous, no peaked T waves appreciated. Discussed with Dr. Davis, recommend to stop LR, repeat BMP @ 4pm. Will continue to monitor, c/w telemetry, follow up repeat labs.

## 2022-07-21 DIAGNOSIS — E78.5 HYPERLIPIDEMIA, UNSPECIFIED: ICD-10-CM

## 2022-07-21 LAB
ANION GAP SERPL CALC-SCNC: 11 MMOL/L — SIGNIFICANT CHANGE UP (ref 7–14)
BASOPHILS # BLD AUTO: 0.08 K/UL — SIGNIFICANT CHANGE UP (ref 0–0.2)
BASOPHILS NFR BLD AUTO: 0.5 % — SIGNIFICANT CHANGE UP (ref 0–2)
BUN SERPL-MCNC: 66 MG/DL — HIGH (ref 7–23)
CALCIUM SERPL-MCNC: 9.1 MG/DL — SIGNIFICANT CHANGE UP (ref 8.4–10.5)
CHLORIDE SERPL-SCNC: 111 MMOL/L — HIGH (ref 98–107)
CO2 SERPL-SCNC: 25 MMOL/L — SIGNIFICANT CHANGE UP (ref 22–31)
CREAT SERPL-MCNC: 1.16 MG/DL — SIGNIFICANT CHANGE UP (ref 0.5–1.3)
EGFR: 60 ML/MIN/1.73M2 — SIGNIFICANT CHANGE UP
EOSINOPHIL # BLD AUTO: 0.03 K/UL — SIGNIFICANT CHANGE UP (ref 0–0.5)
EOSINOPHIL NFR BLD AUTO: 0.2 % — SIGNIFICANT CHANGE UP (ref 0–6)
GLUCOSE BLDC GLUCOMTR-MCNC: 120 MG/DL — HIGH (ref 70–99)
GLUCOSE BLDC GLUCOMTR-MCNC: 137 MG/DL — HIGH (ref 70–99)
GLUCOSE BLDC GLUCOMTR-MCNC: 204 MG/DL — HIGH (ref 70–99)
GLUCOSE BLDC GLUCOMTR-MCNC: 236 MG/DL — HIGH (ref 70–99)
GLUCOSE BLDC GLUCOMTR-MCNC: 283 MG/DL — HIGH (ref 70–99)
GLUCOSE BLDC GLUCOMTR-MCNC: 317 MG/DL — HIGH (ref 70–99)
GLUCOSE SERPL-MCNC: 171 MG/DL — HIGH (ref 70–99)
HCT VFR BLD CALC: 42.1 % — SIGNIFICANT CHANGE UP (ref 39–50)
HGB BLD-MCNC: 12.9 G/DL — LOW (ref 13–17)
IANC: 14.19 K/UL — HIGH (ref 1.8–7.4)
IMM GRANULOCYTES NFR BLD AUTO: 2.2 % — HIGH (ref 0–1.5)
LYMPHOCYTES # BLD AUTO: 0.91 K/UL — LOW (ref 1–3.3)
LYMPHOCYTES # BLD AUTO: 5.6 % — LOW (ref 13–44)
MAGNESIUM SERPL-MCNC: 2.4 MG/DL — SIGNIFICANT CHANGE UP (ref 1.6–2.6)
MCHC RBC-ENTMCNC: 30.6 GM/DL — LOW (ref 32–36)
MCHC RBC-ENTMCNC: 31.5 PG — SIGNIFICANT CHANGE UP (ref 27–34)
MCV RBC AUTO: 102.9 FL — HIGH (ref 80–100)
MONOCYTES # BLD AUTO: 0.66 K/UL — SIGNIFICANT CHANGE UP (ref 0–0.9)
MONOCYTES NFR BLD AUTO: 4.1 % — SIGNIFICANT CHANGE UP (ref 2–14)
NEUTROPHILS # BLD AUTO: 14.19 K/UL — HIGH (ref 1.8–7.4)
NEUTROPHILS NFR BLD AUTO: 87.4 % — HIGH (ref 43–77)
NRBC # BLD: 0 /100 WBCS — SIGNIFICANT CHANGE UP
NRBC # FLD: 0.03 K/UL — HIGH
PHOSPHATE SERPL-MCNC: 3 MG/DL — SIGNIFICANT CHANGE UP (ref 2.5–4.5)
PLATELET # BLD AUTO: 198 K/UL — SIGNIFICANT CHANGE UP (ref 150–400)
POTASSIUM SERPL-MCNC: 4.8 MMOL/L — SIGNIFICANT CHANGE UP (ref 3.5–5.3)
POTASSIUM SERPL-SCNC: 4.8 MMOL/L — SIGNIFICANT CHANGE UP (ref 3.5–5.3)
RBC # BLD: 4.09 M/UL — LOW (ref 4.2–5.8)
RBC # FLD: 14.3 % — SIGNIFICANT CHANGE UP (ref 10.3–14.5)
SODIUM SERPL-SCNC: 147 MMOL/L — HIGH (ref 135–145)
WBC # BLD: 16.22 K/UL — HIGH (ref 3.8–10.5)
WBC # FLD AUTO: 16.22 K/UL — HIGH (ref 3.8–10.5)

## 2022-07-21 PROCEDURE — 99233 SBSQ HOSP IP/OBS HIGH 50: CPT

## 2022-07-21 PROCEDURE — 99222 1ST HOSP IP/OBS MODERATE 55: CPT

## 2022-07-21 RX ORDER — INSULIN LISPRO 100/ML
VIAL (ML) SUBCUTANEOUS AT BEDTIME
Refills: 0 | Status: DISCONTINUED | OUTPATIENT
Start: 2022-07-21 | End: 2022-07-24

## 2022-07-21 RX ORDER — INSULIN GLARGINE 100 [IU]/ML
12 INJECTION, SOLUTION SUBCUTANEOUS AT BEDTIME
Refills: 0 | Status: DISCONTINUED | OUTPATIENT
Start: 2022-07-21 | End: 2022-07-21

## 2022-07-21 RX ORDER — INSULIN LISPRO 100/ML
VIAL (ML) SUBCUTANEOUS
Refills: 0 | Status: DISCONTINUED | OUTPATIENT
Start: 2022-07-21 | End: 2022-07-21

## 2022-07-21 RX ORDER — INSULIN LISPRO 100/ML
VIAL (ML) SUBCUTANEOUS
Refills: 0 | Status: DISCONTINUED | OUTPATIENT
Start: 2022-07-21 | End: 2022-07-24

## 2022-07-21 RX ORDER — QUETIAPINE FUMARATE 200 MG/1
12.5 TABLET, FILM COATED ORAL AT BEDTIME
Refills: 0 | Status: DISCONTINUED | OUTPATIENT
Start: 2022-07-21 | End: 2022-07-27

## 2022-07-21 RX ORDER — INSULIN GLARGINE 100 [IU]/ML
10 INJECTION, SOLUTION SUBCUTANEOUS AT BEDTIME
Refills: 0 | Status: DISCONTINUED | OUTPATIENT
Start: 2022-07-21 | End: 2022-07-29

## 2022-07-21 RX ADMIN — APIXABAN 2.5 MILLIGRAM(S): 2.5 TABLET, FILM COATED ORAL at 17:52

## 2022-07-21 RX ADMIN — Medication 9 MILLIGRAM(S): at 21:22

## 2022-07-21 RX ADMIN — APIXABAN 2.5 MILLIGRAM(S): 2.5 TABLET, FILM COATED ORAL at 06:34

## 2022-07-21 RX ADMIN — Medication 1 TABLET(S): at 12:28

## 2022-07-21 RX ADMIN — Medication 50 MILLIGRAM(S): at 06:33

## 2022-07-21 RX ADMIN — Medication 4: at 17:58

## 2022-07-21 RX ADMIN — AMLODIPINE BESYLATE 5 MILLIGRAM(S): 2.5 TABLET ORAL at 06:33

## 2022-07-21 RX ADMIN — Medication 2: at 12:29

## 2022-07-21 RX ADMIN — DONEPEZIL HYDROCHLORIDE 5 MILLIGRAM(S): 10 TABLET, FILM COATED ORAL at 21:22

## 2022-07-21 RX ADMIN — Medication 50 MILLIGRAM(S): at 17:53

## 2022-07-21 RX ADMIN — Medication 500 MILLIGRAM(S): at 12:29

## 2022-07-21 RX ADMIN — QUETIAPINE FUMARATE 12.5 MILLIGRAM(S): 200 TABLET, FILM COATED ORAL at 21:22

## 2022-07-21 RX ADMIN — PANTOPRAZOLE SODIUM 40 MILLIGRAM(S): 20 TABLET, DELAYED RELEASE ORAL at 06:34

## 2022-07-21 RX ADMIN — INSULIN GLARGINE 10 UNIT(S): 100 INJECTION, SOLUTION SUBCUTANEOUS at 21:33

## 2022-07-21 RX ADMIN — SIMVASTATIN 20 MILLIGRAM(S): 20 TABLET, FILM COATED ORAL at 21:22

## 2022-07-21 RX ADMIN — Medication 2: at 02:49

## 2022-07-21 RX ADMIN — Medication 1: at 21:26

## 2022-07-21 NOTE — PROGRESS NOTE ADULT - PROBLEM SELECTOR PLAN 6
- A-fib with intermittent RVR.  - c/w renally dosed apixaban 2.5 mg BID (on 5mg at home)  - Continue metoprolol - increased to 50mg BID.  - On PRN IV metoprolol for sustained breakthrough A-fib with RVR.  - TropT trend of 49->42 not consistent with ACS, likely mild demand from infection.  - TTE with mild LV systolic dysfunction and stage II diastolic dysfunction. - A-fib with intermittent RVR.  - c/w renally dosed apixaban 2.5 mg BID, d/w son-in-law cardiologist who agreed this is the appropriate dose for him  - Continue metoprolol 50mg BID.  - TTE with mild LV systolic dysfunction and stage II diastolic dysfunction.

## 2022-07-21 NOTE — PROGRESS NOTE ADULT - PROBLEM SELECTOR PLAN 3
in setting of advanced dementia and worsening from acute infection of COVID-19  SLP recommending purred and mod-thick liquids  will have SLP reassess liquids in setting of advanced dementia and worsening from acute infection of COVID-19  SLP recommending purred and mod-thick liquids

## 2022-07-21 NOTE — PROGRESS NOTE ADULT - PROBLEM SELECTOR PLAN 2
- likely secondary to poor PO intake, resolved with IVF  - continue to encourage PO water intake.   - Continue to monitor BMP

## 2022-07-21 NOTE — PROGRESS NOTE ADULT - PROBLEM SELECTOR PLAN 9
DVT prophylaxis: therapeutic apixaban.  Diet: CC / DASH/TLC. Purred/Mod thick liquids per SLP  Dispo: pending Cr normalization,  MARSHA.  GOC: DNR/DNI.    Communication: updated daughter Maddie over the phone 7/20 DVT prophylaxis: therapeutic apixaban.  Diet: CC / DASH/TLC. Purred/Mod thick liquids per SLP  Dispo: pending Na normalization and stable mood,  MARSHA.  GOC: DNR/DNI.    Communication: updated son-in-law today 7/21

## 2022-07-21 NOTE — SWALLOW BEDSIDE ASSESSMENT ADULT - SWALLOW EVAL: RECOMMENDED DIET
1. Continue with current diet of puree with mildly thick liquids as recommended during clinical swallow evaluation on 7/18/22.

## 2022-07-21 NOTE — PROGRESS NOTE ADULT - PROBLEM SELECTOR PLAN 5
- Now improving, with worsening mental status in the setting of acute infection.  - If mental status does not continue to improve with treatment and improvement with other symptoms, or acute mental status change or change in neuro exam, consider CT head for further evaluation.  - Continue donepezil.  - Has been on haloperidol PRN. - Now improving, with worsening mental status in the setting of acute infection.  - If mental status does not continue to improve with treatment and improvement with other symptoms, or acute mental status change or change in neuro exam, consider CT head for further evaluation.  - Continue donepezil.  - not requiring any PRNs  - will start 12.5 seroquel HS

## 2022-07-21 NOTE — PROGRESS NOTE ADULT - ASSESSMENT
89M h/o dementia (A&Ox2 at baseline, A-fib (apixaban), DM2, HTN, [place] admitted 7/11 with cough / sneezing / increased confusion after SARS-CoV-2 exposure found to have COVID-19. Course c/b intermittent tachycardia, also with hypernatremia and hyperglycemia in the setting of steroid use.

## 2022-07-21 NOTE — PROGRESS NOTE ADULT - PROBLEM SELECTOR PLAN 8
BP controlled   - holding losartan given TENZIN  - will c/w amlodipine 5mg for now, consider dose reduction to 2.5 if BP is soft BP controlled   - holding losartan given TENZIN  - will c/w amlodipine 5mg  - c/w metoprolol 50mg

## 2022-07-21 NOTE — PROGRESS NOTE ADULT - PROBLEM SELECTOR PLAN 7
- A1c is 6.1  - poorly controlled, secondary to steroid induced hyperglycemia  - dexamethasone discontinued 7/19, lantus discontinued   - ISS  - FS qAC/HS.  - Hold oral glycemic control agents. - A1c is 6.1  - poorly controlled, secondary to steroid induced hyperglycemia  - dexamethasone discontinued 7/19, lantus decreased to 10U per endo  - ISS  - FS qAC/HS.  - Hold oral glycemic control agents.

## 2022-07-21 NOTE — PROGRESS NOTE ADULT - PROBLEM SELECTOR PLAN 4
- Exposed 7/6/2022.  - Vaccinated and boosted x2 (Pfizer, LD 04/2022).  - Afebrile, on RA.  - Completed remdesivir.  - Continues on dexamethasone through 7/20/2022.  - Continue to trend D-dimer, CRP.  - Maintain isolation precautions as per hospital protocol.  - Supportive Care. - Exposed 7/6/2022.  - Vaccinated and boosted x2 (Pfizer, LD 04/2022).  - Afebrile, on RA.  - Completed remdesivir.  - Continues on dexamethasone through 7/20/2022.  - Continue to trend D-dimer, CRP.  - now off isolation precautions as per hospital protocol.  - Supportive Care.

## 2022-07-21 NOTE — PROGRESS NOTE ADULT - SUBJECTIVE AND OBJECTIVE BOX
Patient is a 89y old  Male who presents with a chief complaint of Increasing confusion, cold like symptoms, covid exposure (20 Jul 2022 09:03)    Jay Davis MD   [facility] Division of Hospital Medicine   Pager [phone]  Reachable on Microsoft Teams     SUBJECTIVE / OVERNIGHT EVENTS:  Patient seen and examined today.    MEDICATIONS  (STANDING):  amLODIPine   Tablet 5 milliGRAM(s) Oral daily  apixaban 2.5 milliGRAM(s) Oral every 12 hours  ascorbic acid 500 milliGRAM(s) Oral daily  dextrose 5%. 1000 milliLiter(s) (100 mL/Hr) IV Continuous <Continuous>  dextrose 5%. 1000 milliLiter(s) (50 mL/Hr) IV Continuous <Continuous>  dextrose 5%. 1000 milliLiter(s) (50 mL/Hr) IV Continuous <Continuous>  dextrose 5%. 1000 milliLiter(s) (100 mL/Hr) IV Continuous <Continuous>  dextrose 50% Injectable 25 Gram(s) IV Push once  dextrose 50% Injectable 12.5 Gram(s) IV Push once  dextrose 50% Injectable 25 Gram(s) IV Push once  dextrose 50% Injectable 25 Gram(s) IV Push once  dextrose 50% Injectable 12.5 Gram(s) IV Push once  dextrose 50% Injectable 25 Gram(s) IV Push once  donepezil 5 milliGRAM(s) Oral at bedtime  glucagon  Injectable 1 milliGRAM(s) IntraMuscular once  glucagon  Injectable 1 milliGRAM(s) IntraMuscular once  insulin glargine Injectable (LANTUS) 12 Unit(s) SubCutaneous at bedtime  insulin lispro (ADMELOG) corrective regimen sliding scale   SubCutaneous three times a day before meals  insulin lispro (ADMELOG) corrective regimen sliding scale   SubCutaneous at bedtime  melatonin 9 milliGRAM(s) Oral at bedtime  metoprolol tartrate 50 milliGRAM(s) Oral two times a day  multivitamin 1 Tablet(s) Oral daily  pantoprazole    Tablet 40 milliGRAM(s) Oral before breakfast  simvastatin 20 milliGRAM(s) Oral at bedtime    MEDICATIONS  (PRN):  acetaminophen     Tablet .. 650 milliGRAM(s) Oral every 4 hours PRN Temp greater or equal to 38C (100.4F), Severe Pain (7 - 10)  ALBUTerol    90 MICROgram(s) HFA Inhaler 2 Puff(s) Inhalation every 6 hours PRN Bronchospasm  dextrose Oral Gel 15 Gram(s) Oral once PRN Blood Glucose LESS THAN 70 milliGRAM(s)/deciliter  dextrose Oral Gel 15 Gram(s) Oral once PRN Blood Glucose LESS THAN 70 milliGRAM(s)/deciliter  guaiFENesin Oral Liquid (Sugar-Free) 100 milliGRAM(s) Oral every 6 hours PRN Cough  metoprolol tartrate Injectable 5 milliGRAM(s) IV Push every 6 hours PRN sustained HR over 140 bpm      Vital Signs Last 24 Hrs  T(C): 36.1 (21 Jul 2022 06:30), Max: 36.6 (20 Jul 2022 22:45)  T(F): 97 (21 Jul 2022 06:30), Max: 97.8 (20 Jul 2022 22:45)  HR: 85 (21 Jul 2022 06:30) (74 - 85)  BP: 146/75 (21 Jul 2022 06:30) (106/60 - 146/75)  BP(mean): --  RR: 16 (21 Jul 2022 06:30) (16 - 18)  SpO2: 94% (21 Jul 2022 06:30) (94% - 96%)  CAPILLARY BLOOD GLUCOSE      POCT Blood Glucose.: 120 mg/dL (21 Jul 2022 06:32)  POCT Blood Glucose.: 204 mg/dL (21 Jul 2022 02:48)  POCT Blood Glucose.: 419 mg/dL (20 Jul 2022 22:58)  POCT Blood Glucose.: 426 mg/dL (20 Jul 2022 21:31)  POCT Blood Glucose.: 426 mg/dL (20 Jul 2022 21:31)  POCT Blood Glucose.: 447 mg/dL (20 Jul 2022 20:29)  POCT Blood Glucose.: 422 mg/dL (20 Jul 2022 18:46)  POCT Blood Glucose.: 436 mg/dL (20 Jul 2022 17:44)  POCT Blood Glucose.: 404 mg/dL (20 Jul 2022 17:43)  POCT Blood Glucose.: 248 mg/dL (20 Jul 2022 12:25)    I&O's Summary    20 Jul 2022 07:01  -  21 Jul 2022 07:00  --------------------------------------------------------  IN: 500 mL / OUT: 600 mL / NET: -100 mL        General: frail appearing elderly man laying down in bed appears comfortable in NAD, awake and alert  Eyes:  nonicteric sclera  HENMT: dry oral mucosa   Neck:  trachea midline   Respiratory: prominent ribs. No respiratory distress, CTABL, No rales, rhonchi, wheezing.  Cardiovascular: S1,S2; Regular rate and rhythm; No murmurs   Gastrointestinal: scaphoid abdomen, soft, Nontender, Nondistended; +BS.   Extremities: No c/c/e; cool to touch  Psych: appropriate mood and affect      LABS:                        12.9   16.22 )-----------( 198      ( 21 Jul 2022 05:44 )             42.1     07-21    147<H>  |  111<H>  |  66<H>  ----------------------------<  171<H>  4.8   |  25  |  1.16    Ca    9.1      21 Jul 2022 05:44  Phos  3.0     07-21  Mg     2.40     07-21                RADIOLOGY & ADDITIONAL TESTS:    Imaging Personally Reviewed:    Consultant(s) Notes Reviewed:      Care Discussed with Consultants/Other Providers:   Patient is a 89y old  Male who presents with a chief complaint of Increasing confusion, cold like symptoms, covid exposure (20 Jul 2022 09:03)    Jay Davis MD   [facility] Division of Hospital Medicine   Pager [phone]  Reachable on Microsoft Teams     SUBJECTIVE / OVERNIGHT EVENTS:  Patient seen and examined this morning very [place] therefore difficult to obtain history.  Hyperglycemic last night, recently discontinued steroids   Much more alert today. Ate entire breakfast. Has been having normal BMs.     MEDICATIONS  (STANDING):  amLODIPine   Tablet 5 milliGRAM(s) Oral daily  apixaban 2.5 milliGRAM(s) Oral every 12 hours  ascorbic acid 500 milliGRAM(s) Oral daily  dextrose 5%. 1000 milliLiter(s) (100 mL/Hr) IV Continuous <Continuous>  dextrose 5%. 1000 milliLiter(s) (50 mL/Hr) IV Continuous <Continuous>  dextrose 5%. 1000 milliLiter(s) (50 mL/Hr) IV Continuous <Continuous>  dextrose 5%. 1000 milliLiter(s) (100 mL/Hr) IV Continuous <Continuous>  dextrose 50% Injectable 25 Gram(s) IV Push once  dextrose 50% Injectable 12.5 Gram(s) IV Push once  dextrose 50% Injectable 25 Gram(s) IV Push once  dextrose 50% Injectable 25 Gram(s) IV Push once  dextrose 50% Injectable 12.5 Gram(s) IV Push once  dextrose 50% Injectable 25 Gram(s) IV Push once  donepezil 5 milliGRAM(s) Oral at bedtime  glucagon  Injectable 1 milliGRAM(s) IntraMuscular once  glucagon  Injectable 1 milliGRAM(s) IntraMuscular once  insulin glargine Injectable (LANTUS) 12 Unit(s) SubCutaneous at bedtime  insulin lispro (ADMELOG) corrective regimen sliding scale   SubCutaneous three times a day before meals  insulin lispro (ADMELOG) corrective regimen sliding scale   SubCutaneous at bedtime  melatonin 9 milliGRAM(s) Oral at bedtime  metoprolol tartrate 50 milliGRAM(s) Oral two times a day  multivitamin 1 Tablet(s) Oral daily  pantoprazole    Tablet 40 milliGRAM(s) Oral before breakfast  simvastatin 20 milliGRAM(s) Oral at bedtime    MEDICATIONS  (PRN):  acetaminophen     Tablet .. 650 milliGRAM(s) Oral every 4 hours PRN Temp greater or equal to 38C (100.4F), Severe Pain (7 - 10)  ALBUTerol    90 MICROgram(s) HFA Inhaler 2 Puff(s) Inhalation every 6 hours PRN Bronchospasm  dextrose Oral Gel 15 Gram(s) Oral once PRN Blood Glucose LESS THAN 70 milliGRAM(s)/deciliter  dextrose Oral Gel 15 Gram(s) Oral once PRN Blood Glucose LESS THAN 70 milliGRAM(s)/deciliter  guaiFENesin Oral Liquid (Sugar-Free) 100 milliGRAM(s) Oral every 6 hours PRN Cough  metoprolol tartrate Injectable 5 milliGRAM(s) IV Push every 6 hours PRN sustained HR over 140 bpm      Vital Signs Last 24 Hrs  T(C): 36.1 (21 Jul 2022 06:30), Max: 36.6 (20 Jul 2022 22:45)  T(F): 97 (21 Jul 2022 06:30), Max: 97.8 (20 Jul 2022 22:45)  HR: 85 (21 Jul 2022 06:30) (74 - 85)  BP: 146/75 (21 Jul 2022 06:30) (106/60 - 146/75)  BP(mean): --  RR: 16 (21 Jul 2022 06:30) (16 - 18)  SpO2: 94% (21 Jul 2022 06:30) (94% - 96%)  CAPILLARY BLOOD GLUCOSE      POCT Blood Glucose.: 120 mg/dL (21 Jul 2022 06:32)  POCT Blood Glucose.: 204 mg/dL (21 Jul 2022 02:48)  POCT Blood Glucose.: 419 mg/dL (20 Jul 2022 22:58)  POCT Blood Glucose.: 426 mg/dL (20 Jul 2022 21:31)  POCT Blood Glucose.: 426 mg/dL (20 Jul 2022 21:31)  POCT Blood Glucose.: 447 mg/dL (20 Jul 2022 20:29)  POCT Blood Glucose.: 422 mg/dL (20 Jul 2022 18:46)  POCT Blood Glucose.: 436 mg/dL (20 Jul 2022 17:44)  POCT Blood Glucose.: 404 mg/dL (20 Jul 2022 17:43)  POCT Blood Glucose.: 248 mg/dL (20 Jul 2022 12:25)    I&O's Summary    20 Jul 2022 07:01  -  21 Jul 2022 07:00  --------------------------------------------------------  IN: 500 mL / OUT: 600 mL / NET: -100 mL        General: frail appearing elderly man laying down in bed appears comfortable in NAD, awake and alert  Eyes:  nonicteric sclera  HENMT: dry oral mucosa   Neck:  trachea midline   Respiratory: prominent ribs. No respiratory distress, CTABL, No rales, rhonchi, wheezing.  Cardiovascular: S1,S2; Regular rate and rhythm; No murmurs   Gastrointestinal: scaphoid abdomen, soft, Nontender, Nondistended; +BS.   Extremities: No c/c/e; cool to touch  Psych: appropriate mood and affect      LABS:                        12.9   16.22 )-----------( 198      ( 21 Jul 2022 05:44 )             42.1     07-21    147<H>  |  111<H>  |  66<H>  ----------------------------<  171<H>  4.8   |  25  |  1.16    Ca    9.1      21 Jul 2022 05:44  Phos  3.0     07-21  Mg     2.40     07-21                RADIOLOGY & ADDITIONAL TESTS:    Imaging Personally Reviewed:    Consultant(s) Notes Reviewed:      Care Discussed with Consultants/Other Providers:

## 2022-07-21 NOTE — PROGRESS NOTE ADULT - PROBLEM SELECTOR PLAN 1
- resolved with IV fluids   - continue to Encourage PO hydration. - improved with IV fluids   - continue to Encourage PO hydration

## 2022-07-22 DIAGNOSIS — D72.829 ELEVATED WHITE BLOOD CELL COUNT, UNSPECIFIED: ICD-10-CM

## 2022-07-22 DIAGNOSIS — G93.41 METABOLIC ENCEPHALOPATHY: ICD-10-CM

## 2022-07-22 LAB
ALBUMIN SERPL ELPH-MCNC: 2.2 G/DL — LOW (ref 3.3–5)
ALP SERPL-CCNC: 73 U/L — SIGNIFICANT CHANGE UP (ref 40–120)
ALT FLD-CCNC: 328 U/L — HIGH (ref 4–41)
ANION GAP SERPL CALC-SCNC: 11 MMOL/L — SIGNIFICANT CHANGE UP (ref 7–14)
ANION GAP SERPL CALC-SCNC: 23 MMOL/L — HIGH (ref 7–14)
AST SERPL-CCNC: 530 U/L — HIGH (ref 4–40)
BASOPHILS # BLD AUTO: 0.04 K/UL — SIGNIFICANT CHANGE UP (ref 0–0.2)
BASOPHILS NFR BLD AUTO: 0.2 % — SIGNIFICANT CHANGE UP (ref 0–2)
BILIRUB SERPL-MCNC: 1 MG/DL — SIGNIFICANT CHANGE UP (ref 0.2–1.2)
BLD GP AB SCN SERPL QL: NEGATIVE — SIGNIFICANT CHANGE UP
BLOOD GAS ARTERIAL COMPREHENSIVE RESULT: SIGNIFICANT CHANGE UP
BUN SERPL-MCNC: 77 MG/DL — HIGH (ref 7–23)
BUN SERPL-MCNC: 83 MG/DL — HIGH (ref 7–23)
CALCIUM SERPL-MCNC: 7.9 MG/DL — LOW (ref 8.4–10.5)
CALCIUM SERPL-MCNC: 8.5 MG/DL — SIGNIFICANT CHANGE UP (ref 8.4–10.5)
CHLORIDE SERPL-SCNC: 112 MMOL/L — HIGH (ref 98–107)
CHLORIDE SERPL-SCNC: 114 MMOL/L — HIGH (ref 98–107)
CK MB BLD-MCNC: 3.6 % — HIGH (ref 0–2.5)
CK MB CFR SERPL CALC: 3.6 NG/ML — SIGNIFICANT CHANGE UP
CK SERPL-CCNC: 101 U/L — SIGNIFICANT CHANGE UP (ref 30–200)
CO2 SERPL-SCNC: 12 MMOL/L — LOW (ref 22–31)
CO2 SERPL-SCNC: 23 MMOL/L — SIGNIFICANT CHANGE UP (ref 22–31)
CREAT SERPL-MCNC: 1.85 MG/DL — HIGH (ref 0.5–1.3)
CREAT SERPL-MCNC: 2.92 MG/DL — HIGH (ref 0.5–1.3)
EGFR: 20 ML/MIN/1.73M2 — LOW
EGFR: 34 ML/MIN/1.73M2 — LOW
EOSINOPHIL # BLD AUTO: 0 K/UL — SIGNIFICANT CHANGE UP (ref 0–0.5)
EOSINOPHIL NFR BLD AUTO: 0 % — SIGNIFICANT CHANGE UP (ref 0–6)
GLUCOSE BLDC GLUCOMTR-MCNC: 108 MG/DL — HIGH (ref 70–99)
GLUCOSE BLDC GLUCOMTR-MCNC: 162 MG/DL — HIGH (ref 70–99)
GLUCOSE BLDC GLUCOMTR-MCNC: 200 MG/DL — HIGH (ref 70–99)
GLUCOSE BLDC GLUCOMTR-MCNC: 285 MG/DL — HIGH (ref 70–99)
GLUCOSE BLDC GLUCOMTR-MCNC: 93 MG/DL — SIGNIFICANT CHANGE UP (ref 70–99)
GLUCOSE SERPL-MCNC: 138 MG/DL — HIGH (ref 70–99)
GLUCOSE SERPL-MCNC: 146 MG/DL — HIGH (ref 70–99)
HCT VFR BLD CALC: 29.9 % — LOW (ref 39–50)
HCT VFR BLD CALC: 34.1 % — LOW (ref 39–50)
HGB BLD-MCNC: 10.6 G/DL — LOW (ref 13–17)
HGB BLD-MCNC: 9.2 G/DL — LOW (ref 13–17)
IANC: 17.45 K/UL — HIGH (ref 1.8–7.4)
IMM GRANULOCYTES NFR BLD AUTO: 1.9 % — HIGH (ref 0–1.5)
LYMPHOCYTES # BLD AUTO: 0.8 K/UL — LOW (ref 1–3.3)
LYMPHOCYTES # BLD AUTO: 4.2 % — LOW (ref 13–44)
MAGNESIUM SERPL-MCNC: 2.2 MG/DL — SIGNIFICANT CHANGE UP (ref 1.6–2.6)
MAGNESIUM SERPL-MCNC: 2.5 MG/DL — SIGNIFICANT CHANGE UP (ref 1.6–2.6)
MCHC RBC-ENTMCNC: 30.8 GM/DL — LOW (ref 32–36)
MCHC RBC-ENTMCNC: 31.1 GM/DL — LOW (ref 32–36)
MCHC RBC-ENTMCNC: 32.7 PG — SIGNIFICANT CHANGE UP (ref 27–34)
MCHC RBC-ENTMCNC: 33.2 PG — SIGNIFICANT CHANGE UP (ref 27–34)
MCV RBC AUTO: 105.2 FL — HIGH (ref 80–100)
MCV RBC AUTO: 107.9 FL — HIGH (ref 80–100)
MONOCYTES # BLD AUTO: 0.46 K/UL — SIGNIFICANT CHANGE UP (ref 0–0.9)
MONOCYTES NFR BLD AUTO: 2.4 % — SIGNIFICANT CHANGE UP (ref 2–14)
NEUTROPHILS # BLD AUTO: 17.45 K/UL — HIGH (ref 1.8–7.4)
NEUTROPHILS NFR BLD AUTO: 91.3 % — HIGH (ref 43–77)
NRBC # BLD: 0 /100 WBCS — SIGNIFICANT CHANGE UP
NRBC # BLD: 0 /100 WBCS — SIGNIFICANT CHANGE UP
NRBC # FLD: 0.03 K/UL — HIGH
NRBC # FLD: 0.05 K/UL — HIGH
NT-PROBNP SERPL-SCNC: 2808 PG/ML — HIGH
PHOSPHATE SERPL-MCNC: 3.5 MG/DL — SIGNIFICANT CHANGE UP (ref 2.5–4.5)
PHOSPHATE SERPL-MCNC: 7.5 MG/DL — HIGH (ref 2.5–4.5)
PLATELET # BLD AUTO: 179 K/UL — SIGNIFICANT CHANGE UP (ref 150–400)
PLATELET # BLD AUTO: 212 K/UL — SIGNIFICANT CHANGE UP (ref 150–400)
POTASSIUM SERPL-MCNC: 4.9 MMOL/L — SIGNIFICANT CHANGE UP (ref 3.5–5.3)
POTASSIUM SERPL-MCNC: 6.6 MMOL/L — CRITICAL HIGH (ref 3.5–5.3)
POTASSIUM SERPL-SCNC: 4.9 MMOL/L — SIGNIFICANT CHANGE UP (ref 3.5–5.3)
POTASSIUM SERPL-SCNC: 6.6 MMOL/L — CRITICAL HIGH (ref 3.5–5.3)
PROCALCITONIN SERPL-MCNC: 1.83 NG/ML — HIGH (ref 0.02–0.1)
PROT SERPL-MCNC: 4.9 G/DL — LOW (ref 6–8.3)
RBC # BLD: 2.77 M/UL — LOW (ref 4.2–5.8)
RBC # BLD: 3.24 M/UL — LOW (ref 4.2–5.8)
RBC # FLD: 14.2 % — SIGNIFICANT CHANGE UP (ref 10.3–14.5)
RBC # FLD: 15 % — HIGH (ref 10.3–14.5)
RH IG SCN BLD-IMP: POSITIVE — SIGNIFICANT CHANGE UP
SARS-COV-2 RNA SPEC QL NAA+PROBE: DETECTED
SODIUM SERPL-SCNC: 146 MMOL/L — HIGH (ref 135–145)
SODIUM SERPL-SCNC: 149 MMOL/L — HIGH (ref 135–145)
TROPONIN T, HIGH SENSITIVITY RESULT: 95 NG/L — CRITICAL HIGH
WBC # BLD: 19.11 K/UL — HIGH (ref 3.8–10.5)
WBC # BLD: 24.94 K/UL — HIGH (ref 3.8–10.5)
WBC # FLD AUTO: 19.11 K/UL — HIGH (ref 3.8–10.5)
WBC # FLD AUTO: 24.94 K/UL — HIGH (ref 3.8–10.5)

## 2022-07-22 PROCEDURE — 99291 CRITICAL CARE FIRST HOUR: CPT | Mod: GC

## 2022-07-22 PROCEDURE — 93308 TTE F-UP OR LMTD: CPT | Mod: 26

## 2022-07-22 PROCEDURE — 71045 X-RAY EXAM CHEST 1 VIEW: CPT | Mod: 26

## 2022-07-22 PROCEDURE — 99233 SBSQ HOSP IP/OBS HIGH 50: CPT

## 2022-07-22 PROCEDURE — 76604 US EXAM CHEST: CPT | Mod: 26

## 2022-07-22 PROCEDURE — 93010 ELECTROCARDIOGRAM REPORT: CPT

## 2022-07-22 RX ORDER — SODIUM BICARBONATE 1 MEQ/ML
0.23 SYRINGE (ML) INTRAVENOUS
Qty: 150 | Refills: 0 | Status: DISCONTINUED | OUTPATIENT
Start: 2022-07-22 | End: 2022-07-24

## 2022-07-22 RX ORDER — SODIUM BICARBONATE 1 MEQ/ML
50 SYRINGE (ML) INTRAVENOUS ONCE
Refills: 0 | Status: COMPLETED | OUTPATIENT
Start: 2022-07-22 | End: 2022-07-22

## 2022-07-22 RX ORDER — SODIUM CHLORIDE 9 MG/ML
1000 INJECTION, SOLUTION INTRAVENOUS
Refills: 0 | Status: DISCONTINUED | OUTPATIENT
Start: 2022-07-22 | End: 2022-07-22

## 2022-07-22 RX ORDER — MIDODRINE HYDROCHLORIDE 2.5 MG/1
20 TABLET ORAL ONCE
Refills: 0 | Status: COMPLETED | OUTPATIENT
Start: 2022-07-22 | End: 2022-07-23

## 2022-07-22 RX ORDER — PIPERACILLIN AND TAZOBACTAM 4; .5 G/20ML; G/20ML
3.38 INJECTION, POWDER, LYOPHILIZED, FOR SOLUTION INTRAVENOUS ONCE
Refills: 0 | Status: COMPLETED | OUTPATIENT
Start: 2022-07-22 | End: 2022-07-22

## 2022-07-22 RX ORDER — DEXTROSE 50 % IN WATER 50 %
25 SYRINGE (ML) INTRAVENOUS ONCE
Refills: 0 | Status: COMPLETED | OUTPATIENT
Start: 2022-07-22 | End: 2022-07-22

## 2022-07-22 RX ORDER — OLANZAPINE 15 MG/1
2.5 TABLET, FILM COATED ORAL ONCE
Refills: 0 | Status: DISCONTINUED | OUTPATIENT
Start: 2022-07-22 | End: 2022-07-22

## 2022-07-22 RX ORDER — HYDROCORTISONE 20 MG
50 TABLET ORAL ONCE
Refills: 0 | Status: COMPLETED | OUTPATIENT
Start: 2022-07-22 | End: 2022-07-22

## 2022-07-22 RX ORDER — HALOPERIDOL DECANOATE 100 MG/ML
1 INJECTION INTRAMUSCULAR ONCE
Refills: 0 | Status: COMPLETED | OUTPATIENT
Start: 2022-07-22 | End: 2022-07-22

## 2022-07-22 RX ORDER — SODIUM BICARBONATE 1 MEQ/ML
0.17 SYRINGE (ML) INTRAVENOUS
Qty: 150 | Refills: 0 | Status: DISCONTINUED | OUTPATIENT
Start: 2022-07-22 | End: 2022-07-22

## 2022-07-22 RX ORDER — INSULIN HUMAN 100 [IU]/ML
5 INJECTION, SOLUTION SUBCUTANEOUS ONCE
Refills: 0 | Status: COMPLETED | OUTPATIENT
Start: 2022-07-22 | End: 2022-07-22

## 2022-07-22 RX ADMIN — Medication 25 MILLILITER(S): at 23:14

## 2022-07-22 RX ADMIN — Medication 500 MILLIGRAM(S): at 11:30

## 2022-07-22 RX ADMIN — AMLODIPINE BESYLATE 5 MILLIGRAM(S): 2.5 TABLET ORAL at 05:45

## 2022-07-22 RX ADMIN — SODIUM CHLORIDE 75 MILLILITER(S): 9 INJECTION, SOLUTION INTRAVENOUS at 10:23

## 2022-07-22 RX ADMIN — APIXABAN 2.5 MILLIGRAM(S): 2.5 TABLET, FILM COATED ORAL at 05:45

## 2022-07-22 RX ADMIN — PIPERACILLIN AND TAZOBACTAM 200 GRAM(S): 4; .5 INJECTION, POWDER, LYOPHILIZED, FOR SOLUTION INTRAVENOUS at 20:35

## 2022-07-22 RX ADMIN — Medication 50 MILLIGRAM(S): at 17:18

## 2022-07-22 RX ADMIN — Medication 50 MILLIGRAM(S): at 20:00

## 2022-07-22 RX ADMIN — Medication 1: at 17:54

## 2022-07-22 RX ADMIN — PANTOPRAZOLE SODIUM 40 MILLIGRAM(S): 20 TABLET, DELAYED RELEASE ORAL at 05:45

## 2022-07-22 RX ADMIN — APIXABAN 2.5 MILLIGRAM(S): 2.5 TABLET, FILM COATED ORAL at 17:18

## 2022-07-22 RX ADMIN — Medication 1: at 12:56

## 2022-07-22 RX ADMIN — Medication 50 MILLIGRAM(S): at 05:46

## 2022-07-22 RX ADMIN — Medication 1 TABLET(S): at 11:30

## 2022-07-22 RX ADMIN — Medication 50 MILLIEQUIVALENT(S): at 23:15

## 2022-07-22 RX ADMIN — Medication 50 MILLIEQUIVALENT(S): at 23:26

## 2022-07-22 RX ADMIN — INSULIN HUMAN 5 UNIT(S): 100 INJECTION, SOLUTION SUBCUTANEOUS at 23:15

## 2022-07-22 RX ADMIN — HALOPERIDOL DECANOATE 1 MILLIGRAM(S): 100 INJECTION INTRAMUSCULAR at 03:34

## 2022-07-22 NOTE — PROGRESS NOTE ADULT - PROBLEM SELECTOR PLAN 1
today with WBC count of 25 and increasing confusion, concerning for infection however afebrile, with stable vital signs   has been off steroids for over 48 hours  will get CXR, UA/Urine culture  BCx if febrile or changes in hemodynamics   monitor off antibiotics

## 2022-07-22 NOTE — PROGRESS NOTE ADULT - PROBLEM SELECTOR PLAN 3
- Cr to 1.8, suspected prerenal injury from poor PO intake   - restarting on IVF  - continue to encourage PO water intake.   - Continue to monitor BMP

## 2022-07-22 NOTE — PROGRESS NOTE ADULT - PROBLEM SELECTOR PLAN 4
in setting of advanced dementia and worsening from acute infection of COVID-19  SLP re-eval still recommending purred and mod-thick liquids

## 2022-07-22 NOTE — CHART NOTE - NSCHARTNOTEFT_GEN_A_CORE
MICU Accept Note    CHIEF COMPLAINT: worsening confusion    HPI / INTERVAL HISTORY: Per chart  89M PMHx dementia (AAO x 2 at baseline), Afib on eliquis, DM, HTN, hard of hearing who presents with cough, sneezing, and increased confusion at home after Covid exposure Wednesday July 6. Of note, patient currently AAO x 1 is unable to provide any ROS or contribution to information regarding symptoms due to dementia/delirium. As per his son in law Dr. Zeinab Frost (cardiologist; [phone]), his baseline mental status is AAO x 2 usually, able to basically care for himself for the most part with help of HHA at home. Ambulates independently. As per my discussion with his daughter Maddie, patient has been coughing and sneezing and has been "sundowning" at home, more confused than baseline. He lives with his wife who is currently in ER as well, who also presented with Covid and cognitive decline/increasing confusion. Patient denies having any pain when asked in Slovenian using , as well as staff at bedside who speaks Slovenian. Denied having any other complaints but very limited history as noted above. In the ED, he was noted to have wheezing and mild tachypnea, was given duonebs and decadron, 500cc LR, and haldol 2.5mg IV for agitation. He was noted to have fever of 100.8F and be saturating well on room air.         PAST MEDICAL & SURGICAL HISTORY:  Afib  on eliquis      Diabetes mellitus      Hypertension      Hard of hearing      Dementia      Cataract, bilateral      No significant past surgical history          FAMILY HISTORY:  No pertinent family history in first degree relatives        SOCIAL HISTORY:  Smoking: [  ] Never Smoked  [  ] Former Smoker (# packs x # years)  [  ] Current Smoker (# packs x # years)  Substance Use:   EtOH Use:   Marital Status: [  ] Single  [  ]   [  ]   [  ]   Sexual History:   Occupation:  Recent Travel:  Country of Birth:   Advance Directives:     HOME MEDICATIONS:      Allergies    No Known Allergies    Intolerances          REVIEW OF SYSTEMS:  Constitutional: No fevers, chills, weight loss, weight gain  HEENT: No vision problems, eye pain, nasal congestion, rhinorrhea, sore throat, dysphagia  CV: No chest pain, orthopnea, palpitations  Resp: No cough, dyspnea, wheezing, hemoptysis  GI: No nausea, vomiting, diarrhea, constipation, abdominal pain  : [ ] dysuria [ ] nocturia [ ] hematuria [ ] increased urinary frequency  Musculoskeletal: [ ] back pain [ ] myalgias [ ] arthralgias [ ] fracture  Skin: [ ] rash [ ] itch  Neurological: [ ] headache [ ] dizziness [ ] syncope [ ] weakness [ ] numbness  Psychiatric: [ ] anxiety [ ] depression  Endocrine: [ ] diabetes [ ] thyroid problem  Hematologic/Lymphatic: [ ] anemia [ ] bleeding problem  Allergic/Immunologic: [ ] itchy eyes [ ] nasal discharge [ ] hives [ ] angioedema  [ ] All other systems negative  [ ] Unable to assess ROS because ________    OBJECTIVE:  ICU Vital Signs Last 24 Hrs  T(C): 37.7 (22 Jul 2022 22:01), Max: 37.7 (22 Jul 2022 22:01)  T(F): 99.8 (22 Jul 2022 22:01), Max: 99.8 (22 Jul 2022 22:01)  HR: 110 (22 Jul 2022 22:45) (72 - 125)  BP: 126/75 (22 Jul 2022 22:45) (108/92 - 190/101)  BP(mean): 85 (22 Jul 2022 22:45) (82 - 115)  ABP: --  ABP(mean): --  RR: 30 (22 Jul 2022 22:45) (18 - 30)  SpO2: 93% (22 Jul 2022 22:45) (93% - 97%)    O2 Parameters below as of 22 Jul 2022 22:01  Patient On (Oxygen Delivery Method): mask, nonrebreather              CAPILLARY BLOOD GLUCOSE      POCT Blood Glucose.: 285 mg/dL (22 Jul 2022 19:59)      PHYSICAL EXAM:      HOSPITAL MEDICATIONS:  MEDICATIONS  (STANDING):  amLODIPine   Tablet 5 milliGRAM(s) Oral daily  ascorbic acid 500 milliGRAM(s) Oral daily  dextrose 5%. 1000 milliLiter(s) (100 mL/Hr) IV Continuous <Continuous>  dextrose 5%. 1000 milliLiter(s) (50 mL/Hr) IV Continuous <Continuous>  dextrose 5%. 1000 milliLiter(s) (100 mL/Hr) IV Continuous <Continuous>  dextrose 5%. 1000 milliLiter(s) (50 mL/Hr) IV Continuous <Continuous>  dextrose 50% Injectable 25 Gram(s) IV Push once  dextrose 50% Injectable 12.5 Gram(s) IV Push once  dextrose 50% Injectable 25 Gram(s) IV Push once  dextrose 50% Injectable 25 Gram(s) IV Push once  dextrose 50% Injectable 12.5 Gram(s) IV Push once  dextrose 50% Injectable 25 Gram(s) IV Push once  dextrose 50% Injectable 25 milliLiter(s) IV Push once  donepezil 5 milliGRAM(s) Oral at bedtime  glucagon  Injectable 1 milliGRAM(s) IntraMuscular once  glucagon  Injectable 1 milliGRAM(s) IntraMuscular once  hydrocortisone sodium succinate Injectable 50 milliGRAM(s) IV Push once  insulin glargine Injectable (LANTUS) 10 Unit(s) SubCutaneous at bedtime  insulin lispro (ADMELOG) corrective regimen sliding scale   SubCutaneous at bedtime  insulin lispro (ADMELOG) corrective regimen sliding scale   SubCutaneous three times a day before meals  insulin regular  human recombinant 5 Unit(s) IV Push once  melatonin 9 milliGRAM(s) Oral at bedtime  metoprolol tartrate 50 milliGRAM(s) Oral two times a day  midodrine. 20 milliGRAM(s) Oral once  multivitamin 1 Tablet(s) Oral daily  pantoprazole    Tablet 40 milliGRAM(s) Oral before breakfast  piperacillin/tazobactam IVPB. 3.375 Gram(s) IV Intermittent once  QUEtiapine 12.5 milliGRAM(s) Oral at bedtime  simvastatin 20 milliGRAM(s) Oral at bedtime  sodium bicarbonate  Infusion 0.232 mEq/kG/Hr (100 mL/Hr) IV Continuous <Continuous>  sodium bicarbonate  Injectable 50 milliEquivalent(s) IV Push once  sodium bicarbonate  Injectable 50 milliEquivalent(s) IV Push once    MEDICATIONS  (PRN):  acetaminophen     Tablet .. 650 milliGRAM(s) Oral every 4 hours PRN Temp greater or equal to 38C (100.4F), Severe Pain (7 - 10)  ALBUTerol    90 MICROgram(s) HFA Inhaler 2 Puff(s) Inhalation every 6 hours PRN Bronchospasm  dextrose Oral Gel 15 Gram(s) Oral once PRN Blood Glucose LESS THAN 70 milliGRAM(s)/deciliter  dextrose Oral Gel 15 Gram(s) Oral once PRN Blood Glucose LESS THAN 70 milliGRAM(s)/deciliter  guaiFENesin Oral Liquid (Sugar-Free) 100 milliGRAM(s) Oral every 6 hours PRN Cough  metoprolol tartrate Injectable 5 milliGRAM(s) IV Push every 6 hours PRN sustained HR over 140 bpm      LABS:                        9.2    19.11 )-----------( 179      ( 22 Jul 2022 20:10 )             29.9     Hgb Trend: 9.2<--, 10.6<--, 12.9<--, 10.8<--, 10.7<--  07-22    149<H>  |  114<H>  |  83<H>  ----------------------------<  146<H>  6.6<HH>   |  12<L>  |  2.92<H>    Ca    7.9<L>      22 Jul 2022 20:10  Phos  7.5     07-22  Mg     2.50     07-22    TPro  4.9<L>  /  Alb  2.2<L>  /  TBili  1.0  /  DBili  x   /  AST  530<H>  /  ALT  328<H>  /  AlkPhos  73  07-22    Creatinine Trend: 2.92<--, 1.85<--, 1.16<--, 1.34<--, 1.36<--, 1.19<--      Arterial Blood Gas:  07-22 @ 20:23  7.13/39/95/13/97.5/-15.2  ABG lactate: --        MICROBIOLOGY:     RADIOLOGY & ADDITIONAL TESTS: MICU Accept Note    CHIEF COMPLAINT: worsening confusion    HPI / INTERVAL HISTORY: Per chart  89M PMHx dementia (AAO x 2 at baseline), Afib on eliquis, DM, HTN, hard of hearing, recently COVID-19+ on 7/10 s/p Dexamethasone and Remdesivir, presenting for AMS with worsening confusion and decreased responsiveness.     Course: RRT called prior to ICU admission with hypotension 60s/30s, minimally responsive, in A Fib with rates 90-110s, labs showing leukocytosis with unchanged CXR c/t admission, UA with glucosuria and mild proteinuria, patient received 2L IVF, hydrocortisone 50 mg IV, with cultures send as well as CBC, CMP, VBG lactate, procal, blood cx. Bedside TTE showed mildly decreased LV systolic function with gross hypovolemia, patient received     RRT called for hypotension 60s/30s with new AMS, non-responsive, rectal temp 99.6F, HR 90s-110s Afib, patient on eliquis.   Labs showing increased leukocytosis, CXR from today no change from admission, UA pending,   TTE previously showed mild-mod decrease in LV systolic function  Gave patient 2L IVF, hydrocortisone 50mg IV, sent cultures and lab work: CBC, CMP, VBG lactate, procal, blood cx.   Gave one dose of Zosyn. IV tylenol for rigoring. Suctioned aggressively.   ABG showed: pH 7.13 pCO2 39 pO2 95 on NRB, lactate 12.2   Started patient on Levo for severe sepsis/septic shock          PAST MEDICAL & SURGICAL HISTORY:  Afib  on eliquis      Diabetes mellitus      Hypertension      Hard of hearing      Dementia      Cataract, bilateral      No significant past surgical history          FAMILY HISTORY:  No pertinent family history in first degree relatives        SOCIAL HISTORY:  Smoking: [  ] Never Smoked  [  ] Former Smoker (# packs x # years)  [  ] Current Smoker (# packs x # years)  Substance Use:   EtOH Use:   Marital Status: [  ] Single  [  ]   [  ]   [  ]   Sexual History:   Occupation:  Recent Travel:  Country of Birth:   Advance Directives:     HOME MEDICATIONS:      Allergies    No Known Allergies    Intolerances          REVIEW OF SYSTEMS:  Constitutional: No fevers, chills, weight loss, weight gain  HEENT: No vision problems, eye pain, nasal congestion, rhinorrhea, sore throat, dysphagia  CV: No chest pain, orthopnea, palpitations  Resp: No cough, dyspnea, wheezing, hemoptysis  GI: No nausea, vomiting, diarrhea, constipation, abdominal pain  : [ ] dysuria [ ] nocturia [ ] hematuria [ ] increased urinary frequency  Musculoskeletal: [ ] back pain [ ] myalgias [ ] arthralgias [ ] fracture  Skin: [ ] rash [ ] itch  Neurological: [ ] headache [ ] dizziness [ ] syncope [ ] weakness [ ] numbness  Psychiatric: [ ] anxiety [ ] depression  Endocrine: [ ] diabetes [ ] thyroid problem  Hematologic/Lymphatic: [ ] anemia [ ] bleeding problem  Allergic/Immunologic: [ ] itchy eyes [ ] nasal discharge [ ] hives [ ] angioedema  [ ] All other systems negative  [ ] Unable to assess ROS because ________    OBJECTIVE:  ICU Vital Signs Last 24 Hrs  T(C): 37.7 (22 Jul 2022 22:01), Max: 37.7 (22 Jul 2022 22:01)  T(F): 99.8 (22 Jul 2022 22:01), Max: 99.8 (22 Jul 2022 22:01)  HR: 110 (22 Jul 2022 22:45) (72 - 125)  BP: 126/75 (22 Jul 2022 22:45) (108/92 - 190/101)  BP(mean): 85 (22 Jul 2022 22:45) (82 - 115)  ABP: --  ABP(mean): --  RR: 30 (22 Jul 2022 22:45) (18 - 30)  SpO2: 93% (22 Jul 2022 22:45) (93% - 97%)    O2 Parameters below as of 22 Jul 2022 22:01  Patient On (Oxygen Delivery Method): mask, nonrebreather              CAPILLARY BLOOD GLUCOSE      POCT Blood Glucose.: 285 mg/dL (22 Jul 2022 19:59)      PHYSICAL EXAM:      HOSPITAL MEDICATIONS:  MEDICATIONS  (STANDING):  amLODIPine   Tablet 5 milliGRAM(s) Oral daily  ascorbic acid 500 milliGRAM(s) Oral daily  dextrose 5%. 1000 milliLiter(s) (100 mL/Hr) IV Continuous <Continuous>  dextrose 5%. 1000 milliLiter(s) (50 mL/Hr) IV Continuous <Continuous>  dextrose 5%. 1000 milliLiter(s) (100 mL/Hr) IV Continuous <Continuous>  dextrose 5%. 1000 milliLiter(s) (50 mL/Hr) IV Continuous <Continuous>  dextrose 50% Injectable 25 Gram(s) IV Push once  dextrose 50% Injectable 12.5 Gram(s) IV Push once  dextrose 50% Injectable 25 Gram(s) IV Push once  dextrose 50% Injectable 25 Gram(s) IV Push once  dextrose 50% Injectable 12.5 Gram(s) IV Push once  dextrose 50% Injectable 25 Gram(s) IV Push once  dextrose 50% Injectable 25 milliLiter(s) IV Push once  donepezil 5 milliGRAM(s) Oral at bedtime  glucagon  Injectable 1 milliGRAM(s) IntraMuscular once  glucagon  Injectable 1 milliGRAM(s) IntraMuscular once  hydrocortisone sodium succinate Injectable 50 milliGRAM(s) IV Push once  insulin glargine Injectable (LANTUS) 10 Unit(s) SubCutaneous at bedtime  insulin lispro (ADMELOG) corrective regimen sliding scale   SubCutaneous at bedtime  insulin lispro (ADMELOG) corrective regimen sliding scale   SubCutaneous three times a day before meals  insulin regular  human recombinant 5 Unit(s) IV Push once  melatonin 9 milliGRAM(s) Oral at bedtime  metoprolol tartrate 50 milliGRAM(s) Oral two times a day  midodrine. 20 milliGRAM(s) Oral once  multivitamin 1 Tablet(s) Oral daily  pantoprazole    Tablet 40 milliGRAM(s) Oral before breakfast  piperacillin/tazobactam IVPB. 3.375 Gram(s) IV Intermittent once  QUEtiapine 12.5 milliGRAM(s) Oral at bedtime  simvastatin 20 milliGRAM(s) Oral at bedtime  sodium bicarbonate  Infusion 0.232 mEq/kG/Hr (100 mL/Hr) IV Continuous <Continuous>  sodium bicarbonate  Injectable 50 milliEquivalent(s) IV Push once  sodium bicarbonate  Injectable 50 milliEquivalent(s) IV Push once    MEDICATIONS  (PRN):  acetaminophen     Tablet .. 650 milliGRAM(s) Oral every 4 hours PRN Temp greater or equal to 38C (100.4F), Severe Pain (7 - 10)  ALBUTerol    90 MICROgram(s) HFA Inhaler 2 Puff(s) Inhalation every 6 hours PRN Bronchospasm  dextrose Oral Gel 15 Gram(s) Oral once PRN Blood Glucose LESS THAN 70 milliGRAM(s)/deciliter  dextrose Oral Gel 15 Gram(s) Oral once PRN Blood Glucose LESS THAN 70 milliGRAM(s)/deciliter  guaiFENesin Oral Liquid (Sugar-Free) 100 milliGRAM(s) Oral every 6 hours PRN Cough  metoprolol tartrate Injectable 5 milliGRAM(s) IV Push every 6 hours PRN sustained HR over 140 bpm      LABS:                        9.2    19.11 )-----------( 179      ( 22 Jul 2022 20:10 )             29.9     Hgb Trend: 9.2<--, 10.6<--, 12.9<--, 10.8<--, 10.7<--  07-22    149<H>  |  114<H>  |  83<H>  ----------------------------<  146<H>  6.6<HH>   |  12<L>  |  2.92<H>    Ca    7.9<L>      22 Jul 2022 20:10  Phos  7.5     07-22  Mg     2.50     07-22    TPro  4.9<L>  /  Alb  2.2<L>  /  TBili  1.0  /  DBili  x   /  AST  530<H>  /  ALT  328<H>  /  AlkPhos  73  07-22    Creatinine Trend: 2.92<--, 1.85<--, 1.16<--, 1.34<--, 1.36<--, 1.19<--      Arterial Blood Gas:  07-22 @ 20:23  7.13/39/95/13/97.5/-15.2  ABG lactate: --        MICROBIOLOGY:     RADIOLOGY & ADDITIONAL TESTS: MICU Accept Note    CHIEF COMPLAINT: worsening confusion    HPI / INTERVAL HISTORY: Per chart  89M PMHx dementia (AAO x 2 at baseline), Afib on eliquis, DM, HTN, hard of hearing, recently COVID-19+ on 7/10 s/p Dexamethasone and Remdesivir, presenting for AMS with worsening confusion and decreased responsiveness.     Course: RRT called prior to ICU admission with hypotension 60s/30s, minimally responsive, in A Fib with rates 90-110s, labs showing leukocytosis with unchanged CXR c/t admission, UA with glucosuria and mild proteinuria, patient received 2L IVF, hydrocortisone 50 mg IV, with cultures send as well as CBC, CMP, VBG lactate, procal, blood cx. Pt received one dose of Zosyn, IV tylenol for rigoring and suctioned. Patient started on Levo for severe sepsis/septic shock.    In the ICU, bedside TTE showed grossly LV systolic function and hypovolemia.    PAST MEDICAL & SURGICAL HISTORY:  Afib  on eliquis      Diabetes mellitus      Hypertension      Hard of hearing      Dementia      Cataract, bilateral      No significant past surgical history          FAMILY HISTORY:  No pertinent family history in first degree relatives        SOCIAL HISTORY:  Smoking: [  ] Never Smoked  [  ] Former Smoker (# packs x # years)  [  ] Current Smoker (# packs x # years)  Substance Use:   EtOH Use:   Marital Status: [  ] Single  [  ]   [  ]   [  ]   Sexual History:   Occupation:  Recent Travel:  Country of Birth:   Advance Directives:     HOME MEDICATIONS:      Allergies    No Known Allergies    Intolerances          REVIEW OF SYSTEMS:  Constitutional: No fevers, chills, weight loss, weight gain  HEENT: No vision problems, eye pain, nasal congestion, rhinorrhea, sore throat, dysphagia  CV: No chest pain, orthopnea, palpitations  Resp: No cough, dyspnea, wheezing, hemoptysis  GI: No nausea, vomiting, diarrhea, constipation, abdominal pain  : [ ] dysuria [ ] nocturia [ ] hematuria [ ] increased urinary frequency  Musculoskeletal: [ ] back pain [ ] myalgias [ ] arthralgias [ ] fracture  Skin: [ ] rash [ ] itch  Neurological: [ ] headache [ ] dizziness [ ] syncope [ ] weakness [ ] numbness  Psychiatric: [ ] anxiety [ ] depression  Endocrine: [ ] diabetes [ ] thyroid problem  Hematologic/Lymphatic: [ ] anemia [ ] bleeding problem  Allergic/Immunologic: [ ] itchy eyes [ ] nasal discharge [ ] hives [ ] angioedema  [ ] All other systems negative  [ ] Unable to assess ROS because ________    OBJECTIVE:  ICU Vital Signs Last 24 Hrs  T(C): 37.7 (22 Jul 2022 22:01), Max: 37.7 (22 Jul 2022 22:01)  T(F): 99.8 (22 Jul 2022 22:01), Max: 99.8 (22 Jul 2022 22:01)  HR: 110 (22 Jul 2022 22:45) (72 - 125)  BP: 126/75 (22 Jul 2022 22:45) (108/92 - 190/101)  BP(mean): 85 (22 Jul 2022 22:45) (82 - 115)  ABP: --  ABP(mean): --  RR: 30 (22 Jul 2022 22:45) (18 - 30)  SpO2: 93% (22 Jul 2022 22:45) (93% - 97%)    O2 Parameters below as of 22 Jul 2022 22:01  Patient On (Oxygen Delivery Method): mask, nonrebreather              CAPILLARY BLOOD GLUCOSE      POCT Blood Glucose.: 285 mg/dL (22 Jul 2022 19:59)      PHYSICAL EXAM:      HOSPITAL MEDICATIONS:  MEDICATIONS  (STANDING):  amLODIPine   Tablet 5 milliGRAM(s) Oral daily  ascorbic acid 500 milliGRAM(s) Oral daily  dextrose 5%. 1000 milliLiter(s) (100 mL/Hr) IV Continuous <Continuous>  dextrose 5%. 1000 milliLiter(s) (50 mL/Hr) IV Continuous <Continuous>  dextrose 5%. 1000 milliLiter(s) (100 mL/Hr) IV Continuous <Continuous>  dextrose 5%. 1000 milliLiter(s) (50 mL/Hr) IV Continuous <Continuous>  dextrose 50% Injectable 25 Gram(s) IV Push once  dextrose 50% Injectable 12.5 Gram(s) IV Push once  dextrose 50% Injectable 25 Gram(s) IV Push once  dextrose 50% Injectable 25 Gram(s) IV Push once  dextrose 50% Injectable 12.5 Gram(s) IV Push once  dextrose 50% Injectable 25 Gram(s) IV Push once  dextrose 50% Injectable 25 milliLiter(s) IV Push once  donepezil 5 milliGRAM(s) Oral at bedtime  glucagon  Injectable 1 milliGRAM(s) IntraMuscular once  glucagon  Injectable 1 milliGRAM(s) IntraMuscular once  hydrocortisone sodium succinate Injectable 50 milliGRAM(s) IV Push once  insulin glargine Injectable (LANTUS) 10 Unit(s) SubCutaneous at bedtime  insulin lispro (ADMELOG) corrective regimen sliding scale   SubCutaneous at bedtime  insulin lispro (ADMELOG) corrective regimen sliding scale   SubCutaneous three times a day before meals  insulin regular  human recombinant 5 Unit(s) IV Push once  melatonin 9 milliGRAM(s) Oral at bedtime  metoprolol tartrate 50 milliGRAM(s) Oral two times a day  midodrine. 20 milliGRAM(s) Oral once  multivitamin 1 Tablet(s) Oral daily  pantoprazole    Tablet 40 milliGRAM(s) Oral before breakfast  piperacillin/tazobactam IVPB. 3.375 Gram(s) IV Intermittent once  QUEtiapine 12.5 milliGRAM(s) Oral at bedtime  simvastatin 20 milliGRAM(s) Oral at bedtime  sodium bicarbonate  Infusion 0.232 mEq/kG/Hr (100 mL/Hr) IV Continuous <Continuous>  sodium bicarbonate  Injectable 50 milliEquivalent(s) IV Push once  sodium bicarbonate  Injectable 50 milliEquivalent(s) IV Push once    MEDICATIONS  (PRN):  acetaminophen     Tablet .. 650 milliGRAM(s) Oral every 4 hours PRN Temp greater or equal to 38C (100.4F), Severe Pain (7 - 10)  ALBUTerol    90 MICROgram(s) HFA Inhaler 2 Puff(s) Inhalation every 6 hours PRN Bronchospasm  dextrose Oral Gel 15 Gram(s) Oral once PRN Blood Glucose LESS THAN 70 milliGRAM(s)/deciliter  dextrose Oral Gel 15 Gram(s) Oral once PRN Blood Glucose LESS THAN 70 milliGRAM(s)/deciliter  guaiFENesin Oral Liquid (Sugar-Free) 100 milliGRAM(s) Oral every 6 hours PRN Cough  metoprolol tartrate Injectable 5 milliGRAM(s) IV Push every 6 hours PRN sustained HR over 140 bpm      LABS:                        9.2    19.11 )-----------( 179      ( 22 Jul 2022 20:10 )             29.9     Hgb Trend: 9.2<--, 10.6<--, 12.9<--, 10.8<--, 10.7<--  07-22    149<H>  |  114<H>  |  83<H>  ----------------------------<  146<H>  6.6<HH>   |  12<L>  |  2.92<H>    Ca    7.9<L>      22 Jul 2022 20:10  Phos  7.5     07-22  Mg     2.50     07-22    TPro  4.9<L>  /  Alb  2.2<L>  /  TBili  1.0  /  DBili  x   /  AST  530<H>  /  ALT  328<H>  /  AlkPhos  73  07-22    Creatinine Trend: 2.92<--, 1.85<--, 1.16<--, 1.34<--, 1.36<--, 1.19<--      Arterial Blood Gas:  07-22 @ 20:23  7.13/39/95/13/97.5/-15.2  ABG lactate: --        MICROBIOLOGY:     RADIOLOGY & ADDITIONAL TESTS: MICU Accept Note    CHIEF COMPLAINT: worsening confusion    HPI / INTERVAL HISTORY: Per chart  89M PMHx dementia (AAO x 2 at baseline), Afib on eliquis, DM, HTN, hard of hearing, recently COVID-19+ on 7/10 s/p Dexamethasone and Remdesivir, presenting for AMS with worsening confusion and decreased responsiveness.     Course: RRT called prior to ICU admission with hypotension 60s/30s, minimally responsive, in A Fib with rates 90-110s, labs showing leukocytosis with unchanged CXR c/t admission, UA with glucosuria and mild proteinuria, patient received 2L IVF, hydrocortisone 50 mg IV, with cultures sent as well as CBC, CMP, VBG lactate, procal, blood cx. Pt received one dose of Zosyn, IV tylenol for rigoring and was suctioned, started on Levo for severe sepsis/septic shock. In the ICU, bedside TTE showed grossly LV systolic function with signs of hypovolemia.    PAST MEDICAL & SURGICAL HISTORY:  Afib  on eliquis      Diabetes mellitus      Hypertension      Hard of hearing      Dementia      Cataract, bilateral      No significant past surgical history          FAMILY HISTORY:  No pertinent family history in first degree relatives        SOCIAL HISTORY:  Smoking: [  ] Never Smoked  [  ] Former Smoker (# packs x # years)  [  ] Current Smoker (# packs x # years)  Substance Use:   EtOH Use:   Marital Status: [  ] Single  [  ]   [  ]   [  ]   Sexual History:   Occupation:  Recent Travel:  Country of Birth:   Advance Directives:     HOME MEDICATIONS:      Allergies    No Known Allergies    Intolerances          REVIEW OF SYSTEMS:  Constitutional: No fevers, chills, weight loss, weight gain  HEENT: No vision problems, eye pain, nasal congestion, rhinorrhea, sore throat, dysphagia  CV: No chest pain, orthopnea, palpitations  Resp: No cough, dyspnea, wheezing, hemoptysis  GI: No nausea, vomiting, diarrhea, constipation, abdominal pain  : [ ] dysuria [ ] nocturia [ ] hematuria [ ] increased urinary frequency  Musculoskeletal: [ ] back pain [ ] myalgias [ ] arthralgias [ ] fracture  Skin: [ ] rash [ ] itch  Neurological: [ ] headache [ ] dizziness [ ] syncope [ ] weakness [ ] numbness  Psychiatric: [ ] anxiety [ ] depression  Endocrine: [ ] diabetes [ ] thyroid problem  Hematologic/Lymphatic: [ ] anemia [ ] bleeding problem  Allergic/Immunologic: [ ] itchy eyes [ ] nasal discharge [ ] hives [ ] angioedema  [ ] All other systems negative  [ ] Unable to assess ROS because ________    OBJECTIVE:  ICU Vital Signs Last 24 Hrs  T(C): 37.7 (22 Jul 2022 22:01), Max: 37.7 (22 Jul 2022 22:01)  T(F): 99.8 (22 Jul 2022 22:01), Max: 99.8 (22 Jul 2022 22:01)  HR: 110 (22 Jul 2022 22:45) (72 - 125)  BP: 126/75 (22 Jul 2022 22:45) (108/92 - 190/101)  BP(mean): 85 (22 Jul 2022 22:45) (82 - 115)  ABP: --  ABP(mean): --  RR: 30 (22 Jul 2022 22:45) (18 - 30)  SpO2: 93% (22 Jul 2022 22:45) (93% - 97%)    O2 Parameters below as of 22 Jul 2022 22:01  Patient On (Oxygen Delivery Method): mask, nonrebreather              CAPILLARY BLOOD GLUCOSE      POCT Blood Glucose.: 285 mg/dL (22 Jul 2022 19:59)      PHYSICAL EXAM:      HOSPITAL MEDICATIONS:  MEDICATIONS  (STANDING):  amLODIPine   Tablet 5 milliGRAM(s) Oral daily  ascorbic acid 500 milliGRAM(s) Oral daily  dextrose 5%. 1000 milliLiter(s) (100 mL/Hr) IV Continuous <Continuous>  dextrose 5%. 1000 milliLiter(s) (50 mL/Hr) IV Continuous <Continuous>  dextrose 5%. 1000 milliLiter(s) (100 mL/Hr) IV Continuous <Continuous>  dextrose 5%. 1000 milliLiter(s) (50 mL/Hr) IV Continuous <Continuous>  dextrose 50% Injectable 25 Gram(s) IV Push once  dextrose 50% Injectable 12.5 Gram(s) IV Push once  dextrose 50% Injectable 25 Gram(s) IV Push once  dextrose 50% Injectable 25 Gram(s) IV Push once  dextrose 50% Injectable 12.5 Gram(s) IV Push once  dextrose 50% Injectable 25 Gram(s) IV Push once  dextrose 50% Injectable 25 milliLiter(s) IV Push once  donepezil 5 milliGRAM(s) Oral at bedtime  glucagon  Injectable 1 milliGRAM(s) IntraMuscular once  glucagon  Injectable 1 milliGRAM(s) IntraMuscular once  hydrocortisone sodium succinate Injectable 50 milliGRAM(s) IV Push once  insulin glargine Injectable (LANTUS) 10 Unit(s) SubCutaneous at bedtime  insulin lispro (ADMELOG) corrective regimen sliding scale   SubCutaneous at bedtime  insulin lispro (ADMELOG) corrective regimen sliding scale   SubCutaneous three times a day before meals  insulin regular  human recombinant 5 Unit(s) IV Push once  melatonin 9 milliGRAM(s) Oral at bedtime  metoprolol tartrate 50 milliGRAM(s) Oral two times a day  midodrine. 20 milliGRAM(s) Oral once  multivitamin 1 Tablet(s) Oral daily  pantoprazole    Tablet 40 milliGRAM(s) Oral before breakfast  piperacillin/tazobactam IVPB. 3.375 Gram(s) IV Intermittent once  QUEtiapine 12.5 milliGRAM(s) Oral at bedtime  simvastatin 20 milliGRAM(s) Oral at bedtime  sodium bicarbonate  Infusion 0.232 mEq/kG/Hr (100 mL/Hr) IV Continuous <Continuous>  sodium bicarbonate  Injectable 50 milliEquivalent(s) IV Push once  sodium bicarbonate  Injectable 50 milliEquivalent(s) IV Push once    MEDICATIONS  (PRN):  acetaminophen     Tablet .. 650 milliGRAM(s) Oral every 4 hours PRN Temp greater or equal to 38C (100.4F), Severe Pain (7 - 10)  ALBUTerol    90 MICROgram(s) HFA Inhaler 2 Puff(s) Inhalation every 6 hours PRN Bronchospasm  dextrose Oral Gel 15 Gram(s) Oral once PRN Blood Glucose LESS THAN 70 milliGRAM(s)/deciliter  dextrose Oral Gel 15 Gram(s) Oral once PRN Blood Glucose LESS THAN 70 milliGRAM(s)/deciliter  guaiFENesin Oral Liquid (Sugar-Free) 100 milliGRAM(s) Oral every 6 hours PRN Cough  metoprolol tartrate Injectable 5 milliGRAM(s) IV Push every 6 hours PRN sustained HR over 140 bpm      LABS:                        9.2    19.11 )-----------( 179      ( 22 Jul 2022 20:10 )             29.9     Hgb Trend: 9.2<--, 10.6<--, 12.9<--, 10.8<--, 10.7<--  07-22    149<H>  |  114<H>  |  83<H>  ----------------------------<  146<H>  6.6<HH>   |  12<L>  |  2.92<H>    Ca    7.9<L>      22 Jul 2022 20:10  Phos  7.5     07-22  Mg     2.50     07-22    TPro  4.9<L>  /  Alb  2.2<L>  /  TBili  1.0  /  DBili  x   /  AST  530<H>  /  ALT  328<H>  /  AlkPhos  73  07-22    Creatinine Trend: 2.92<--, 1.85<--, 1.16<--, 1.34<--, 1.36<--, 1.19<--      Arterial Blood Gas:  07-22 @ 20:23  7.13/39/95/13/97.5/-15.2  ABG lactate: --        MICROBIOLOGY:     RADIOLOGY & ADDITIONAL TESTS: MICU Accept Note    CHIEF COMPLAINT: worsening confusion    HPI / INTERVAL HISTORY: Per chart  89M PMHx dementia (AAO x 2 at baseline), Afib on eliquis, DM, HTN, [place], recently COVID-19+ on 7/10 s/p Dexamethasone and Remdesivir, presenting for AMS with worsening confusion and decreased responsiveness.     Course: RRT called prior to ICU admission with hypotension 60s/30s, minimally responsive, in A Fib with rates 90-110s, labs showing leukocytosis with unchanged CXR c/t admission, UA with glucosuria and mild proteinuria, patient received 2L IVF, hydrocortisone 50 mg IV, with cultures sent as well as CBC, CMP, VBG lactate, procal, blood cx. Pt received one dose of Zosyn, IV tylenol and was suctioned, started on Levo for severe sepsis/septic shock. In the ICU, bedside TTE showed grossly LV systolic function with signs of hypovolemia.    PAST MEDICAL & SURGICAL HISTORY:  Afib  on eliquis      Diabetes mellitus      Hypertension      Hard of hearing      Dementia      Cataract, bilateral      No significant past surgical history          FAMILY HISTORY:  No pertinent family history in first degree relatives        SOCIAL HISTORY:  Smoking: [  ] Never Smoked  [ x ] Former cigar smoker  Substance Use: None  EtOH Use: None  Marital Status: [  ] Single  [ x ]   [  ]   [  ]   Advance Directives: DNR    HOME MEDICATIONS:    Donepizil 5 mg daily  Eliquis 5 mg BID  Glipizide XL 10 mg daily  Losartan 50 mg daily  Nebivolol 5 mg daily    Allergies NKDA    REVIEW OF SYSTEMS:  [x] Unable to assess ROS because AMS    OBJECTIVE:  ICU Vital Signs Last 24 Hrs  T(C): 37.7 (22 Jul 2022 22:01), Max: 37.7 (22 Jul 2022 22:01)  T(F): 99.8 (22 Jul 2022 22:01), Max: 99.8 (22 Jul 2022 22:01)  HR: 110 (22 Jul 2022 22:45) (72 - 125)  BP: 126/75 (22 Jul 2022 22:45) (108/92 - 190/101)  BP(mean): 85 (22 Jul 2022 22:45) (82 - 115)  ABP: --  ABP(mean): --  RR: 30 (22 Jul 2022 22:45) (18 - 30)  SpO2: 93% (22 Jul 2022 22:45) (93% - 97%)    O2 Parameters below as of 22 Jul 2022 22:01  Patient On (Oxygen Delivery Method): mask, nonrebreather              CAPILLARY BLOOD GLUCOSE      POCT Blood Glucose.: 285 mg/dL (22 Jul 2022 19:59)    PHYSICAL EXAM (limited due to AMS):   GENERAL: Frail, uncomfortable, NAD.  HEAD:  Atraumatic. Normocephalic. NRB mask in place.  EYES: EOMI. PERRLA. Normal conjunctiva/sclera.  ENT: Neck supple. No JVD. Moist oral mucosa.  CARDIAC: RRR. S1. S2. No murmur. No rub. No distant heart sounds.  LUNG/CHEST: Mild rhonci over right base. BS equal bilaterally. No wheezes or rales.  ABDOMEN: Soft. No tenderness. No distension. Normal bowel sounds.  VASCULAR: +2 b/l radial or ulnar pulses. Palpable DP pulses.  EXTREMITIES:  No clubbing. No cyanosis. No edema. Moving all 4.  NEUROLOGY: Non-focal exam. Moving all extremities.    ICU Vital Signs Last 24 Hrs  T(C): 37.2 (23 Jul 2022 00:00), Max: 37.7 (22 Jul 2022 22:01)  T(F): 99 (23 Jul 2022 00:00), Max: 99.8 (22 Jul 2022 22:01)  HR: 91 (23 Jul 2022 01:30) (72 - 125)  BP: 127/60 (23 Jul 2022 01:30) (65/37 - 190/101)  BP(mean): 77 (23 Jul 2022 01:30) (74 - 115)  ABP: --  ABP(mean): --  RR: 23 (23 Jul 2022 01:30) (18 - 30)  SpO2: 100% (23 Jul 2022 01:30) (88% - 100%)    O2 Parameters below as of 23 Jul 2022 00:00  Patient On (Oxygen Delivery Method): mask, nonrebreather    O2 Concentration (%): 100        I&O's Summary    22 Jul 2022 07:01  -  23 Jul 2022 02:38  --------------------------------------------------------  IN: 1124 mL / OUT: 325 mL / NET: 799 mL            HOSPITAL MEDICATIONS:  MEDICATIONS  (STANDING):  amLODIPine   Tablet 5 milliGRAM(s) Oral daily  ascorbic acid 500 milliGRAM(s) Oral daily  dextrose 5%. 1000 milliLiter(s) (100 mL/Hr) IV Continuous <Continuous>  dextrose 5%. 1000 milliLiter(s) (50 mL/Hr) IV Continuous <Continuous>  dextrose 5%. 1000 milliLiter(s) (100 mL/Hr) IV Continuous <Continuous>  dextrose 5%. 1000 milliLiter(s) (50 mL/Hr) IV Continuous <Continuous>  dextrose 50% Injectable 25 Gram(s) IV Push once  dextrose 50% Injectable 12.5 Gram(s) IV Push once  dextrose 50% Injectable 25 Gram(s) IV Push once  dextrose 50% Injectable 25 Gram(s) IV Push once  dextrose 50% Injectable 12.5 Gram(s) IV Push once  dextrose 50% Injectable 25 Gram(s) IV Push once  dextrose 50% Injectable 25 milliLiter(s) IV Push once  donepezil 5 milliGRAM(s) Oral at bedtime  glucagon  Injectable 1 milliGRAM(s) IntraMuscular once  glucagon  Injectable 1 milliGRAM(s) IntraMuscular once  hydrocortisone sodium succinate Injectable 50 milliGRAM(s) IV Push once  insulin glargine Injectable (LANTUS) 10 Unit(s) SubCutaneous at bedtime  insulin lispro (ADMELOG) corrective regimen sliding scale   SubCutaneous at bedtime  insulin lispro (ADMELOG) corrective regimen sliding scale   SubCutaneous three times a day before meals  insulin regular  human recombinant 5 Unit(s) IV Push once  melatonin 9 milliGRAM(s) Oral at bedtime  metoprolol tartrate 50 milliGRAM(s) Oral two times a day  midodrine. 20 milliGRAM(s) Oral once  multivitamin 1 Tablet(s) Oral daily  pantoprazole    Tablet 40 milliGRAM(s) Oral before breakfast  piperacillin/tazobactam IVPB. 3.375 Gram(s) IV Intermittent once  QUEtiapine 12.5 milliGRAM(s) Oral at bedtime  simvastatin 20 milliGRAM(s) Oral at bedtime  sodium bicarbonate  Infusion 0.232 mEq/kG/Hr (100 mL/Hr) IV Continuous <Continuous>  sodium bicarbonate  Injectable 50 milliEquivalent(s) IV Push once  sodium bicarbonate  Injectable 50 milliEquivalent(s) IV Push once    MEDICATIONS  (PRN):  acetaminophen     Tablet .. 650 milliGRAM(s) Oral every 4 hours PRN Temp greater or equal to 38C (100.4F), Severe Pain (7 - 10)  ALBUTerol    90 MICROgram(s) HFA Inhaler 2 Puff(s) Inhalation every 6 hours PRN Bronchospasm  dextrose Oral Gel 15 Gram(s) Oral once PRN Blood Glucose LESS THAN 70 milliGRAM(s)/deciliter  dextrose Oral Gel 15 Gram(s) Oral once PRN Blood Glucose LESS THAN 70 milliGRAM(s)/deciliter  guaiFENesin Oral Liquid (Sugar-Free) 100 milliGRAM(s) Oral every 6 hours PRN Cough  metoprolol tartrate Injectable 5 milliGRAM(s) IV Push every 6 hours PRN sustained HR over 140 bpm      LABS:                        9.2    19.11 )-----------( 179      ( 22 Jul 2022 20:10 )             29.9     Hgb Trend: 9.2<--, 10.6<--, 12.9<--, 10.8<--, 10.7<--  07-22    149<H>  |  114<H>  |  83<H>  ----------------------------<  146<H>  6.6<HH>   |  12<L>  |  2.92<H>    Ca    7.9<L>      22 Jul 2022 20:10  Phos  7.5     07-22  Mg     2.50     07-22    TPro  4.9<L>  /  Alb  2.2<L>  /  TBili  1.0  /  DBili  x   /  AST  530<H>  /  ALT  328<H>  /  AlkPhos  73  07-22    Creatinine Trend: 2.92<--, 1.85<--, 1.16<--, 1.34<--, 1.36<--, 1.19<--      Arterial Blood Gas:  07-22 @ 20:23  7.13/39/95/13/97.5/-15.2  ABG lactate: --    Assessment: 89M PMHx dementia (AAO x 2 at baseline), Afib on eliquis, DM, HTN, [place], recently COVID-19+ on 7/10 s/p Dexamethasone and Remdesivir, presenting for AMS with worsening confusion and decreased responsiveness. MICU Accept Note    CHIEF COMPLAINT: worsening confusion    HPI / INTERVAL HISTORY: Per chart  89M PMHx dementia (AAO x 2 at baseline), Afib on eliquis, DM, HTN, [place], recently COVID-19+ on 7/10 s/p Dexamethasone and Remdesivir, presenting for AMS with worsening confusion and decreased responsiveness.     Course: RRT called prior to ICU admission with hypotension 60s/30s, minimally responsive, in A Fib with rates 90-110s, labs showing leukocytosis with unchanged CXR c/t admission, UA with glucosuria and mild proteinuria, patient received 2L IVF, hydrocortisone 50 mg IV, with cultures sent as well as CBC, CMP, VBG lactate, procal, blood cx. Pt received one dose of Zosyn, IV tylenol and was suctioned, started on Levo for severe sepsis/septic shock. In the ICU, bedside TTE showed grossly LV systolic function with signs of hypovolemia, and mild consolidation over right lung base.    PAST MEDICAL & SURGICAL HISTORY:  Afib  on eliquis      Diabetes mellitus      Hypertension      Hard of hearing      Dementia      Cataract, bilateral      No significant past surgical history          FAMILY HISTORY:  No pertinent family history in first degree relatives        SOCIAL HISTORY:  Smoking: [  ] Never Smoked  [ x ] Former cigar smoker  Substance Use: None  EtOH Use: None  Marital Status: [  ] Single  [ x ]   [  ]   [  ]   Advance Directives: DNR    HOME MEDICATIONS:    Donepizil 5 mg daily  Eliquis 5 mg BID  Glipizide XL 10 mg daily  Losartan 50 mg daily  Nebivolol 5 mg daily    Allergies NKDA    REVIEW OF SYSTEMS:  [x] Unable to assess ROS because AMS    OBJECTIVE:  ICU Vital Signs Last 24 Hrs  T(C): 37.7 (22 Jul 2022 22:01), Max: 37.7 (22 Jul 2022 22:01)  T(F): 99.8 (22 Jul 2022 22:01), Max: 99.8 (22 Jul 2022 22:01)  HR: 110 (22 Jul 2022 22:45) (72 - 125)  BP: 126/75 (22 Jul 2022 22:45) (108/92 - 190/101)  BP(mean): 85 (22 Jul 2022 22:45) (82 - 115)  ABP: --  ABP(mean): --  RR: 30 (22 Jul 2022 22:45) (18 - 30)  SpO2: 93% (22 Jul 2022 22:45) (93% - 97%)    O2 Parameters below as of 22 Jul 2022 22:01  Patient On (Oxygen Delivery Method): mask, nonrebreather              CAPILLARY BLOOD GLUCOSE      POCT Blood Glucose.: 285 mg/dL (22 Jul 2022 19:59)    PHYSICAL EXAM (limited due to AMS):   GENERAL: Frail, uncomfortable, NAD.  HEAD:  Atraumatic. Normocephalic. NRB mask in place.  EYES: EOMI. PERRLA. Normal conjunctiva/sclera.  ENT: Neck supple. No JVD. Moist oral mucosa.  CARDIAC: RRR. S1. S2. No murmur. No rub. No distant heart sounds.  LUNG/CHEST: Mild rhonci over right base. BS equal bilaterally. No wheezes or rales.  ABDOMEN: Soft. No tenderness. No distension. Normal bowel sounds.  VASCULAR: +2 b/l radial or ulnar pulses. Palpable DP pulses.  EXTREMITIES:  No clubbing. No cyanosis. No edema. Moving all 4.  NEUROLOGY: Non-focal exam. Moving all extremities.    ICU Vital Signs Last 24 Hrs  T(C): 37.2 (23 Jul 2022 00:00), Max: 37.7 (22 Jul 2022 22:01)  T(F): 99 (23 Jul 2022 00:00), Max: 99.8 (22 Jul 2022 22:01)  HR: 91 (23 Jul 2022 01:30) (72 - 125)  BP: 127/60 (23 Jul 2022 01:30) (65/37 - 190/101)  BP(mean): 77 (23 Jul 2022 01:30) (74 - 115)  ABP: --  ABP(mean): --  RR: 23 (23 Jul 2022 01:30) (18 - 30)  SpO2: 100% (23 Jul 2022 01:30) (88% - 100%)    O2 Parameters below as of 23 Jul 2022 00:00  Patient On (Oxygen Delivery Method): mask, nonrebreather    O2 Concentration (%): 100        I&O's Summary    22 Jul 2022 07:01  -  23 Jul 2022 02:38  --------------------------------------------------------  IN: 1124 mL / OUT: 325 mL / NET: 799 mL            HOSPITAL MEDICATIONS:  MEDICATIONS  (STANDING):  amLODIPine   Tablet 5 milliGRAM(s) Oral daily  ascorbic acid 500 milliGRAM(s) Oral daily  dextrose 5%. 1000 milliLiter(s) (100 mL/Hr) IV Continuous <Continuous>  dextrose 5%. 1000 milliLiter(s) (50 mL/Hr) IV Continuous <Continuous>  dextrose 5%. 1000 milliLiter(s) (100 mL/Hr) IV Continuous <Continuous>  dextrose 5%. 1000 milliLiter(s) (50 mL/Hr) IV Continuous <Continuous>  dextrose 50% Injectable 25 Gram(s) IV Push once  dextrose 50% Injectable 12.5 Gram(s) IV Push once  dextrose 50% Injectable 25 Gram(s) IV Push once  dextrose 50% Injectable 25 Gram(s) IV Push once  dextrose 50% Injectable 12.5 Gram(s) IV Push once  dextrose 50% Injectable 25 Gram(s) IV Push once  dextrose 50% Injectable 25 milliLiter(s) IV Push once  donepezil 5 milliGRAM(s) Oral at bedtime  glucagon  Injectable 1 milliGRAM(s) IntraMuscular once  glucagon  Injectable 1 milliGRAM(s) IntraMuscular once  hydrocortisone sodium succinate Injectable 50 milliGRAM(s) IV Push once  insulin glargine Injectable (LANTUS) 10 Unit(s) SubCutaneous at bedtime  insulin lispro (ADMELOG) corrective regimen sliding scale   SubCutaneous at bedtime  insulin lispro (ADMELOG) corrective regimen sliding scale   SubCutaneous three times a day before meals  insulin regular  human recombinant 5 Unit(s) IV Push once  melatonin 9 milliGRAM(s) Oral at bedtime  metoprolol tartrate 50 milliGRAM(s) Oral two times a day  midodrine. 20 milliGRAM(s) Oral once  multivitamin 1 Tablet(s) Oral daily  pantoprazole    Tablet 40 milliGRAM(s) Oral before breakfast  piperacillin/tazobactam IVPB. 3.375 Gram(s) IV Intermittent once  QUEtiapine 12.5 milliGRAM(s) Oral at bedtime  simvastatin 20 milliGRAM(s) Oral at bedtime  sodium bicarbonate  Infusion 0.232 mEq/kG/Hr (100 mL/Hr) IV Continuous <Continuous>  sodium bicarbonate  Injectable 50 milliEquivalent(s) IV Push once  sodium bicarbonate  Injectable 50 milliEquivalent(s) IV Push once    MEDICATIONS  (PRN):  acetaminophen     Tablet .. 650 milliGRAM(s) Oral every 4 hours PRN Temp greater or equal to 38C (100.4F), Severe Pain (7 - 10)  ALBUTerol    90 MICROgram(s) HFA Inhaler 2 Puff(s) Inhalation every 6 hours PRN Bronchospasm  dextrose Oral Gel 15 Gram(s) Oral once PRN Blood Glucose LESS THAN 70 milliGRAM(s)/deciliter  dextrose Oral Gel 15 Gram(s) Oral once PRN Blood Glucose LESS THAN 70 milliGRAM(s)/deciliter  guaiFENesin Oral Liquid (Sugar-Free) 100 milliGRAM(s) Oral every 6 hours PRN Cough  metoprolol tartrate Injectable 5 milliGRAM(s) IV Push every 6 hours PRN sustained HR over 140 bpm      LABS:                        9.2    19.11 )-----------( 179      ( 22 Jul 2022 20:10 )             29.9     Hgb Trend: 9.2<--, 10.6<--, 12.9<--, 10.8<--, 10.7<--  07-22    149<H>  |  114<H>  |  83<H>  ----------------------------<  146<H>  6.6<HH>   |  12<L>  |  2.92<H>    Ca    7.9<L>      22 Jul 2022 20:10  Phos  7.5     07-22  Mg     2.50     07-22    TPro  4.9<L>  /  Alb  2.2<L>  /  TBili  1.0  /  DBili  x   /  AST  530<H>  /  ALT  328<H>  /  AlkPhos  73  07-22    Creatinine Trend: 2.92<--, 1.85<--, 1.16<--, 1.34<--, 1.36<--, 1.19<--      Arterial Blood Gas:  07-22 @ 20:23  7.13/39/95/13/97.5/-15.2  ABG lactate: --    Assessment: 89M PMHx dementia (AAO x 2 at baseline), Afib on eliquis, DM, HTN, [place], recently COVID-19+ on 7/10 s/p Dexamethasone and Remdesivir, presenting for AMS with worsening confusion and decreased responsiveness, found to be in septic shock secondary to acute metabolic acidosis vs aspiration PNA    Neuro  #baseline dementia  -now appearing more altered likely iso toxic metabolic encephalopathy  -will check CT head to r/o structural causes    Pulm  #DDx aspiration PNA  -on NRB  -CXR clear  -possible aspiration PNA given consolidation over right lung base, will cover broadly with Vanc, Zosyn    Cardiac  -wean pressor requirements as tolerated  -on stress dose steroids  -held OAc pending imaging  -held antihypertensives in setting of hypotension    GI  -NPO given possible aspiration  -signs of shock liver, continue to monitor  -given limited clinical findings, will check CT abdomen for occult fluid collections/infectious source    Renal  #TENZIN, prerenal  #severe metabolic acidosis  -will start bicarb bolus and gtt to alleviate both hypovolemia and acidosis  -trend CMP    Endo  #DM  -FS q6h and SSI    ID  #Septic shock with uncertain cause  -Will cover DDx aspiration PNA broadly with Vanc and Zosyn  -Blood cultures x 2  -Trend CBC and lactate  -CTH and CT abdomen pending  -UA unremarkable for infectious cause    Heme/Onc  -held OAc pending imaging    PPx  -SCD for DVT    GOC  -D/w family, plan to pursue trial of pressor support and abx, forgoing HD/RRT MICU Accept Note    CHIEF COMPLAINT: worsening confusion    HPI / INTERVAL HISTORY: Per chart  89M PMHx dementia (AAO x 2 at baseline), Afib on eliquis, DM, HTN, [place], recently COVID-19+ on 7/10 s/p Dexamethasone and Remdesivir, presenting for AMS with worsening confusion and decreased responsiveness.     Course: RRT called prior to ICU admission with hypotension 60s/30s, minimally responsive, in A Fib with rates 90-110s, labs showing leukocytosis with unchanged CXR c/t admission, UA with glucosuria and mild proteinuria, patient received 2L IVF, hydrocortisone 50 mg IV, with cultures sent as well as CBC, CMP, VBG lactate, procal, blood cx. Pt received one dose of Zosyn, IV tylenol and was suctioned, started on Levo for severe sepsis/septic shock. In the ICU, bedside TTE showed grossly LV systolic function with signs of hypovolemia, and mild consolidation over right lung base.    PAST MEDICAL & SURGICAL HISTORY:  Afib  on eliquis      Diabetes mellitus      Hypertension      Hard of hearing      Dementia      Cataract, bilateral      No significant past surgical history          FAMILY HISTORY:  No pertinent family history in first degree relatives        SOCIAL HISTORY:  Smoking: [  ] Never Smoked  [ x ] Former cigar smoker  Substance Use: None  EtOH Use: None  Marital Status: [  ] Single  [ x ]   [  ]   [  ]   Advance Directives: DNR    HOME MEDICATIONS:    Donepizil 5 mg daily  Eliquis 5 mg BID  Glipizide XL 10 mg daily  Losartan 50 mg daily  Nebivolol 5 mg daily    Allergies NKDA    REVIEW OF SYSTEMS:  [x] Unable to assess ROS because AMS    OBJECTIVE:  ICU Vital Signs Last 24 Hrs  T(C): 37.7 (22 Jul 2022 22:01), Max: 37.7 (22 Jul 2022 22:01)  T(F): 99.8 (22 Jul 2022 22:01), Max: 99.8 (22 Jul 2022 22:01)  HR: 110 (22 Jul 2022 22:45) (72 - 125)  BP: 126/75 (22 Jul 2022 22:45) (108/92 - 190/101)  BP(mean): 85 (22 Jul 2022 22:45) (82 - 115)  ABP: --  ABP(mean): --  RR: 30 (22 Jul 2022 22:45) (18 - 30)  SpO2: 93% (22 Jul 2022 22:45) (93% - 97%)    O2 Parameters below as of 22 Jul 2022 22:01  Patient On (Oxygen Delivery Method): mask, nonrebreather              CAPILLARY BLOOD GLUCOSE      POCT Blood Glucose.: 285 mg/dL (22 Jul 2022 19:59)    PHYSICAL EXAM (limited due to AMS):   GENERAL: Frail, uncomfortable, NAD.  HEAD:  Atraumatic. Normocephalic. NRB mask in place.  EYES: EOMI. PERRLA. Normal conjunctiva/sclera.  ENT: Neck supple. No JVD. Moist oral mucosa.  CARDIAC: RRR. S1. S2. No murmur. No rub. No distant heart sounds.  LUNG/CHEST: Mild rhonci over right base. BS equal bilaterally. No wheezes or rales.  ABDOMEN: Soft. No tenderness. No distension. Normal bowel sounds.  VASCULAR: +2 b/l radial or ulnar pulses. Palpable DP pulses.  EXTREMITIES:  No clubbing. No cyanosis. No edema. Moving all 4.  NEUROLOGY: Non-focal exam. Moving all extremities.    ICU Vital Signs Last 24 Hrs  T(C): 37.2 (23 Jul 2022 00:00), Max: 37.7 (22 Jul 2022 22:01)  T(F): 99 (23 Jul 2022 00:00), Max: 99.8 (22 Jul 2022 22:01)  HR: 91 (23 Jul 2022 01:30) (72 - 125)  BP: 127/60 (23 Jul 2022 01:30) (65/37 - 190/101)  BP(mean): 77 (23 Jul 2022 01:30) (74 - 115)  ABP: --  ABP(mean): --  RR: 23 (23 Jul 2022 01:30) (18 - 30)  SpO2: 100% (23 Jul 2022 01:30) (88% - 100%)    O2 Parameters below as of 23 Jul 2022 00:00  Patient On (Oxygen Delivery Method): mask, nonrebreather    O2 Concentration (%): 100        I&O's Summary    22 Jul 2022 07:01  -  23 Jul 2022 02:38  --------------------------------------------------------  IN: 1124 mL / OUT: 325 mL / NET: 799 mL            HOSPITAL MEDICATIONS:  MEDICATIONS  (STANDING):  amLODIPine   Tablet 5 milliGRAM(s) Oral daily  ascorbic acid 500 milliGRAM(s) Oral daily  dextrose 5%. 1000 milliLiter(s) (100 mL/Hr) IV Continuous <Continuous>  dextrose 5%. 1000 milliLiter(s) (50 mL/Hr) IV Continuous <Continuous>  dextrose 5%. 1000 milliLiter(s) (100 mL/Hr) IV Continuous <Continuous>  dextrose 5%. 1000 milliLiter(s) (50 mL/Hr) IV Continuous <Continuous>  dextrose 50% Injectable 25 Gram(s) IV Push once  dextrose 50% Injectable 12.5 Gram(s) IV Push once  dextrose 50% Injectable 25 Gram(s) IV Push once  dextrose 50% Injectable 25 Gram(s) IV Push once  dextrose 50% Injectable 12.5 Gram(s) IV Push once  dextrose 50% Injectable 25 Gram(s) IV Push once  dextrose 50% Injectable 25 milliLiter(s) IV Push once  donepezil 5 milliGRAM(s) Oral at bedtime  glucagon  Injectable 1 milliGRAM(s) IntraMuscular once  glucagon  Injectable 1 milliGRAM(s) IntraMuscular once  hydrocortisone sodium succinate Injectable 50 milliGRAM(s) IV Push once  insulin glargine Injectable (LANTUS) 10 Unit(s) SubCutaneous at bedtime  insulin lispro (ADMELOG) corrective regimen sliding scale   SubCutaneous at bedtime  insulin lispro (ADMELOG) corrective regimen sliding scale   SubCutaneous three times a day before meals  insulin regular  human recombinant 5 Unit(s) IV Push once  melatonin 9 milliGRAM(s) Oral at bedtime  metoprolol tartrate 50 milliGRAM(s) Oral two times a day  midodrine. 20 milliGRAM(s) Oral once  multivitamin 1 Tablet(s) Oral daily  pantoprazole    Tablet 40 milliGRAM(s) Oral before breakfast  piperacillin/tazobactam IVPB. 3.375 Gram(s) IV Intermittent once  QUEtiapine 12.5 milliGRAM(s) Oral at bedtime  simvastatin 20 milliGRAM(s) Oral at bedtime  sodium bicarbonate  Infusion 0.232 mEq/kG/Hr (100 mL/Hr) IV Continuous <Continuous>  sodium bicarbonate  Injectable 50 milliEquivalent(s) IV Push once  sodium bicarbonate  Injectable 50 milliEquivalent(s) IV Push once    MEDICATIONS  (PRN):  acetaminophen     Tablet .. 650 milliGRAM(s) Oral every 4 hours PRN Temp greater or equal to 38C (100.4F), Severe Pain (7 - 10)  ALBUTerol    90 MICROgram(s) HFA Inhaler 2 Puff(s) Inhalation every 6 hours PRN Bronchospasm  dextrose Oral Gel 15 Gram(s) Oral once PRN Blood Glucose LESS THAN 70 milliGRAM(s)/deciliter  dextrose Oral Gel 15 Gram(s) Oral once PRN Blood Glucose LESS THAN 70 milliGRAM(s)/deciliter  guaiFENesin Oral Liquid (Sugar-Free) 100 milliGRAM(s) Oral every 6 hours PRN Cough  metoprolol tartrate Injectable 5 milliGRAM(s) IV Push every 6 hours PRN sustained HR over 140 bpm      LABS:                        9.2    19.11 )-----------( 179      ( 22 Jul 2022 20:10 )             29.9     Hgb Trend: 9.2<--, 10.6<--, 12.9<--, 10.8<--, 10.7<--  07-22    149<H>  |  114<H>  |  83<H>  ----------------------------<  146<H>  6.6<HH>   |  12<L>  |  2.92<H>    Ca    7.9<L>      22 Jul 2022 20:10  Phos  7.5     07-22  Mg     2.50     07-22    TPro  4.9<L>  /  Alb  2.2<L>  /  TBili  1.0  /  DBili  x   /  AST  530<H>  /  ALT  328<H>  /  AlkPhos  73  07-22    Creatinine Trend: 2.92<--, 1.85<--, 1.16<--, 1.34<--, 1.36<--, 1.19<--      Arterial Blood Gas:  07-22 @ 20:23  7.13/39/95/13/97.5/-15.2  ABG lactate: --    Assessment: 89M PMHx dementia (AAO x 2 at baseline), Afib on eliquis, DM, HTN, [place], recently COVID-19+ on 7/10 s/p Dexamethasone and Remdesivir, presenting for AMS with worsening confusion and decreased responsiveness, found to be in septic shock secondary to acute metabolic acidosis vs aspiration PNA    Neuro  #baseline dementia  -now appearing more altered likely iso toxic metabolic encephalopathy  -will check CT head to r/o structural causes    Pulm  #DDx aspiration PNA  -on NRB  -CXR clear  -possible aspiration PNA given consolidation over right lung base, will cover broadly with Vanc, Zosyn    Cardiac  -wean pressor requirements as tolerated  -on stress dose steroids  -held OAc pending imaging  -held antihypertensives in setting of hypotension    GI  -NPO given possible aspiration  -signs of shock liver, continue to monitor  -given limited clinical findings, will check CT abdomen for occult fluid collections/infectious source    Renal  #TENZIN, prerenal  #severe metabolic acidosis  -will start bicarb bolus and gtt to alleviate both hypovolemia and acidosis  -trend CMP    Endo  #DM  -FS q6h and SSI    ID  #Septic shock with uncertain cause  -Will cover DDx aspiration PNA broadly with Vanc and Zosyn  -Blood cultures x 2  -Trend CBC and lactate  -CTH and CT abdomen pending  -UA unremarkable for infectious cause    Heme/Onc  -held OAc pending imaging    PPx  -SCD for DVT    GOC  -D/w family, plan to pursue trial of pressor support and abx, forgoing HD/RRT      Patient seen and examined with the MICU resident at bed side. Agree with above    89M PMHx dementia (AAO x 2 at baseline), Afib on eliquis, DM, HTN, [place], recently COVID-19+ on 7/10 s/p Dexamethasone and Remdesivir. He was on the medical floors when developed worsening AMS and hypotension. BP in the 70's, unable to answer questions. RRT was called. Lab with lactatic acidosis to 12, shock liver, TENZIN, with hyperkalemia, respiratory distress. Started on pressors after 2L IVF. Brought to micu. Initially on Levo at 2 now down to .8.    # Shock likely vasoplegia/hypovolemic/septic  # TENZIN on CKD  # hyperkalemia  # shock liver, transaminitis  # COVID 19  # Encephalopathy  # Metabolic acidosis with lactic acid  # Afib  - Will need CT head given ams on full a/c, hold apixaban for now  - Shock, will given additional IVF and bicarb gtt, trend labs, abx as above, check vanc levels, f/u cultures  - c/w stress dose steroids  - Mointor IO's, treat hyperkalemia, see GOC note, No RRT  - Will need CT A/P as well given unclear source of infection and lactic acidosis.   - GOC - DNR/DNI, family would still like pressors. Was functional but demented prior. Prognosis is guarded. Family is aware.           Patient is critically ill, requiring critical care services.     Attending: I have personally and independently provided 35 minutes of critical care services.  This excludes any time spent on separate procedures or teaching. MICU Accept Note    CHIEF COMPLAINT: worsening confusion    HPI / INTERVAL HISTORY: Per chart  89M PMHx dementia (AAO x 2 at baseline), Afib on eliquis, DM, HTN, [place], recently COVID-19+ on 7/10 s/p Dexamethasone and Remdesivir, presenting for AMS with worsening confusion and decreased responsiveness.     Course: RRT called prior to ICU admission with hypotension 60s/30s, minimally responsive, in A Fib with rates 90-110s, labs showing leukocytosis with unchanged CXR c/t admission, UA with glucosuria and mild proteinuria, patient received 2L IVF, hydrocortisone 50 mg IV, with cultures sent as well as CBC, CMP, VBG lactate, procal, blood cx. Pt received one dose of Zosyn, IV tylenol and was suctioned, started on Levo for severe sepsis/septic shock. In the ICU, bedside TTE showed grossly LV systolic function with signs of hypovolemia, and mild consolidation over right lung base.    PAST MEDICAL & SURGICAL HISTORY:  Afib  on eliquis      Diabetes mellitus      Hypertension      Hard of hearing      Dementia      Cataract, bilateral      No significant past surgical history          FAMILY HISTORY:  No pertinent family history in first degree relatives        SOCIAL HISTORY:  Smoking: [  ] Never Smoked  [ x ] Former cigar smoker  Substance Use: None  EtOH Use: None  Marital Status: [  ] Single  [ x ]   [  ]   [  ]   Advance Directives: DNR    HOME MEDICATIONS:    Donepizil 5 mg daily  Eliquis 5 mg BID  Glipizide XL 10 mg daily  Losartan 50 mg daily  Nebivolol 5 mg daily    Allergies NKDA    REVIEW OF SYSTEMS:  [x] Unable to assess ROS because AMS    OBJECTIVE:  ICU Vital Signs Last 24 Hrs  T(C): 37.7 (22 Jul 2022 22:01), Max: 37.7 (22 Jul 2022 22:01)  T(F): 99.8 (22 Jul 2022 22:01), Max: 99.8 (22 Jul 2022 22:01)  HR: 110 (22 Jul 2022 22:45) (72 - 125)  BP: 126/75 (22 Jul 2022 22:45) (108/92 - 190/101)  BP(mean): 85 (22 Jul 2022 22:45) (82 - 115)  ABP: --  ABP(mean): --  RR: 30 (22 Jul 2022 22:45) (18 - 30)  SpO2: 93% (22 Jul 2022 22:45) (93% - 97%)    O2 Parameters below as of 22 Jul 2022 22:01  Patient On (Oxygen Delivery Method): mask, nonrebreather              CAPILLARY BLOOD GLUCOSE      POCT Blood Glucose.: 285 mg/dL (22 Jul 2022 19:59)    PHYSICAL EXAM (limited due to AMS):   GENERAL: Frail, uncomfortable, NAD.  HEAD:  Atraumatic. Normocephalic. NRB mask in place.  EYES: EOMI. PERRLA. Normal conjunctiva/sclera.  ENT: Neck supple. No JVD. Moist oral mucosa.  CARDIAC: Not tachy, Irregularly irregular rhythm. S1. S2. No murmur. No rub. No distant heart sounds.  LUNG/CHEST: Mild rhonci over right base. BS equal bilaterally. No wheezes or rales.  ABDOMEN: Soft. No tenderness. No distension. Normal bowel sounds.  VASCULAR: +2 b/l radial or ulnar pulses. Palpable DP pulses.  EXTREMITIES:  No clubbing. No cyanosis. No edema. Moving all 4.  NEUROLOGY: Non-focal exam. Moving all extremities.    ICU Vital Signs Last 24 Hrs  T(C): 37.2 (23 Jul 2022 00:00), Max: 37.7 (22 Jul 2022 22:01)  T(F): 99 (23 Jul 2022 00:00), Max: 99.8 (22 Jul 2022 22:01)  HR: 91 (23 Jul 2022 01:30) (72 - 125)  BP: 127/60 (23 Jul 2022 01:30) (65/37 - 190/101)  BP(mean): 77 (23 Jul 2022 01:30) (74 - 115)  ABP: --  ABP(mean): --  RR: 23 (23 Jul 2022 01:30) (18 - 30)  SpO2: 100% (23 Jul 2022 01:30) (88% - 100%)    O2 Parameters below as of 23 Jul 2022 00:00  Patient On (Oxygen Delivery Method): mask, nonrebreather    O2 Concentration (%): 100        I&O's Summary    22 Jul 2022 07:01  -  23 Jul 2022 02:38  --------------------------------------------------------  IN: 1124 mL / OUT: 325 mL / NET: 799 mL            HOSPITAL MEDICATIONS:  MEDICATIONS  (STANDING):  amLODIPine   Tablet 5 milliGRAM(s) Oral daily  ascorbic acid 500 milliGRAM(s) Oral daily  dextrose 5%. 1000 milliLiter(s) (100 mL/Hr) IV Continuous <Continuous>  dextrose 5%. 1000 milliLiter(s) (50 mL/Hr) IV Continuous <Continuous>  dextrose 5%. 1000 milliLiter(s) (100 mL/Hr) IV Continuous <Continuous>  dextrose 5%. 1000 milliLiter(s) (50 mL/Hr) IV Continuous <Continuous>  dextrose 50% Injectable 25 Gram(s) IV Push once  dextrose 50% Injectable 12.5 Gram(s) IV Push once  dextrose 50% Injectable 25 Gram(s) IV Push once  dextrose 50% Injectable 25 Gram(s) IV Push once  dextrose 50% Injectable 12.5 Gram(s) IV Push once  dextrose 50% Injectable 25 Gram(s) IV Push once  dextrose 50% Injectable 25 milliLiter(s) IV Push once  donepezil 5 milliGRAM(s) Oral at bedtime  glucagon  Injectable 1 milliGRAM(s) IntraMuscular once  glucagon  Injectable 1 milliGRAM(s) IntraMuscular once  hydrocortisone sodium succinate Injectable 50 milliGRAM(s) IV Push once  insulin glargine Injectable (LANTUS) 10 Unit(s) SubCutaneous at bedtime  insulin lispro (ADMELOG) corrective regimen sliding scale   SubCutaneous at bedtime  insulin lispro (ADMELOG) corrective regimen sliding scale   SubCutaneous three times a day before meals  insulin regular  human recombinant 5 Unit(s) IV Push once  melatonin 9 milliGRAM(s) Oral at bedtime  metoprolol tartrate 50 milliGRAM(s) Oral two times a day  midodrine. 20 milliGRAM(s) Oral once  multivitamin 1 Tablet(s) Oral daily  pantoprazole    Tablet 40 milliGRAM(s) Oral before breakfast  piperacillin/tazobactam IVPB. 3.375 Gram(s) IV Intermittent once  QUEtiapine 12.5 milliGRAM(s) Oral at bedtime  simvastatin 20 milliGRAM(s) Oral at bedtime  sodium bicarbonate  Infusion 0.232 mEq/kG/Hr (100 mL/Hr) IV Continuous <Continuous>  sodium bicarbonate  Injectable 50 milliEquivalent(s) IV Push once  sodium bicarbonate  Injectable 50 milliEquivalent(s) IV Push once    MEDICATIONS  (PRN):  acetaminophen     Tablet .. 650 milliGRAM(s) Oral every 4 hours PRN Temp greater or equal to 38C (100.4F), Severe Pain (7 - 10)  ALBUTerol    90 MICROgram(s) HFA Inhaler 2 Puff(s) Inhalation every 6 hours PRN Bronchospasm  dextrose Oral Gel 15 Gram(s) Oral once PRN Blood Glucose LESS THAN 70 milliGRAM(s)/deciliter  dextrose Oral Gel 15 Gram(s) Oral once PRN Blood Glucose LESS THAN 70 milliGRAM(s)/deciliter  guaiFENesin Oral Liquid (Sugar-Free) 100 milliGRAM(s) Oral every 6 hours PRN Cough  metoprolol tartrate Injectable 5 milliGRAM(s) IV Push every 6 hours PRN sustained HR over 140 bpm      LABS:                        9.2    19.11 )-----------( 179      ( 22 Jul 2022 20:10 )             29.9     Hgb Trend: 9.2<--, 10.6<--, 12.9<--, 10.8<--, 10.7<--  07-22    149<H>  |  114<H>  |  83<H>  ----------------------------<  146<H>  6.6<HH>   |  12<L>  |  2.92<H>    Ca    7.9<L>      22 Jul 2022 20:10  Phos  7.5     07-22  Mg     2.50     07-22    TPro  4.9<L>  /  Alb  2.2<L>  /  TBili  1.0  /  DBili  x   /  AST  530<H>  /  ALT  328<H>  /  AlkPhos  73  07-22    Creatinine Trend: 2.92<--, 1.85<--, 1.16<--, 1.34<--, 1.36<--, 1.19<--      Arterial Blood Gas:  07-22 @ 20:23  7.13/39/95/13/97.5/-15.2  ABG lactate: --    Assessment: 89M PMHx dementia (AAO x 2 at baseline), Afib on eliquis, DM, HTN, [place], recently COVID-19+ on 7/10 s/p Dexamethasone and Remdesivir, presenting for AMS with worsening confusion and decreased responsiveness, found to be in septic shock secondary to acute metabolic acidosis vs aspiration PNA    Neuro  #baseline dementia  -now appearing more altered likely iso toxic metabolic encephalopathy  -will check CT head to r/o structural causes    Pulm  #DDx aspiration PNA  -on NRB  -CXR clear  -possible aspiration PNA given consolidation over right lung base, will cover broadly with Vanc, Zosyn    Cardiac  -wean pressor requirements as tolerated  -on stress dose steroids  -held OAc pending imaging  -held antihypertensives in setting of hypotension    GI  -NPO given possible aspiration  -signs of shock liver, continue to monitor  -given limited clinical findings, will check CT abdomen for occult fluid collections/infectious source    Renal  #TENZIN, prerenal  #severe metabolic acidosis  -will start bicarb bolus and gtt to alleviate both hypovolemia and acidosis  -trend CMP    Endo  #DM  -FS q6h and SSI    ID  #Septic shock with uncertain cause  -Will cover DDx aspiration PNA broadly with Vanc and Zosyn  -Blood cultures x 2  -Trend CBC and lactate  -CTH and CT abdomen pending  -UA unremarkable for infectious cause    Heme/Onc  -held OAc pending imaging    PPx  -SCD for DVT    GOC  -D/w family, plan to pursue trial of pressor support and abx, forgoing HD/RRT      Patient seen and examined with the MICU resident at bed side. Agree with above    89M PMHx dementia (AAO x 2 at baseline), Afib on eliquis, DM, HTN, [place], recently COVID-19+ on 7/10 s/p Dexamethasone and Remdesivir. He was on the medical floors when developed worsening AMS and hypotension. BP in the 70's, unable to answer questions. RRT was called. Lab with lactatic acidosis to 12, shock liver, TENZIN, with hyperkalemia, respiratory distress. Started on pressors after 2L IVF. Brought to micu. Initially on Levo at 2 now down to .8.    # Shock likely vasoplegia/hypovolemic/septic  # TENZIN on CKD  # hyperkalemia  # shock liver, transaminitis  # COVID 19  # Encephalopathy  # Metabolic acidosis with lactic acid  # Afib  - Will need CT head given ams on full a/c, hold apixaban for now  - Shock, will given additional IVF and bicarb gtt, trend labs, abx as above, check vanc levels, f/u cultures  - c/w stress dose steroids  - Mointor IO's, treat hyperkalemia, see GOC note, No RRT  - Will need CT A/P as well given unclear source of infection and lactic acidosis.   - GOC - DNR/DNI, family would still like pressors. Was functional but demented prior. Prognosis is guarded. Family is aware.           Patient is critically ill, requiring critical care services.     Attending: I have personally and independently provided 35 minutes of critical care services.  This excludes any time spent on separate procedures or teaching.

## 2022-07-22 NOTE — CHART NOTE - NSCHARTNOTEFT_GEN_A_CORE
88 y/o M with a PMHx of dementia (AAO x 2 at baseline), Afib on eliquis, DM, HTN, hard of hearing a/w COVID now s/p remdesivir and dexamethasone. RRT called for hypotension (68/34) and hypoxia (O2 Sat 89%). Pt's family (daughter Maddie and son-in-law Zeinab) at bedside throughout RRT. After 2L IVF, BP 82/59. O2 Sat 100% on NRB. Pt noted to be in respiratory distress. Patient suctioned and copious amounts of residual food evacuated. Temp 98.1. RR 28. Pt also received Zosyn for suspected sepsis. CXR performed earlier in day was unremarkable. Pt started on pressors, MICU consulted and accepted patient.

## 2022-07-22 NOTE — PROGRESS NOTE ADULT - PROBLEM SELECTOR PLAN 7
- A-fib with intermittent RVR.  - c/w renally dosed apixaban 2.5 mg BID, d/w son-in-law cardiologist who agreed this is the appropriate dose for him  - Continue metoprolol 50mg BID.  - TTE with mild LV systolic dysfunction and stage II diastolic dysfunction.

## 2022-07-22 NOTE — CHART NOTE - NSCHARTNOTEFT_GEN_A_CORE
: Dae Hyeon Kim    INDICATION: Shock ,hypotension    PROCEDURE:  [ x] LIMITED ECHO  [ x] LIMITED CHEST  [ ] LIMITED RETROPERITONEAL  [ ] LIMITED ABDOMINAL  [ ] LIMITED DVT  [ ] NEEDLE GUIDANCE VASCULAR  [ ] NEEDLE GUIDANCE THORACENTESIS  [ ] NEEDLE GUIDANCE PARACENTESIS  [ ] NEEDLE GUIDANCE PERICARDIOCENTESIS  [ ] OTHER    FINDINGS:  Alines anteriorly, no consolidations or plefs  Normal LVSF, hyperdynamic, RV smaller vs LV, no pericardial effusion  IVC < 1cm, fully collapsed with respiration.       INTERPRETATION:  Clear lungs  Grossly normal LVSF  Tiny IVC possible volume down  Hypovolemic shock.     Images uploaded on [employer].

## 2022-07-22 NOTE — PROGRESS NOTE ADULT - PROBLEM SELECTOR PLAN 5
- Exposed 7/6/2022.  - Vaccinated and boosted x2 (Pfizer, LD 04/2022).  - Afebrile, on RA.  - Completed remdesivir.  - Continues on dexamethasone through 7/20/2022.  - Continue to trend D-dimer, CRP.  - now off isolation precautions as per hospital protocol.  - Supportive Care.

## 2022-07-22 NOTE — PROGRESS NOTE ADULT - PROBLEM SELECTOR PLAN 10
DVT prophylaxis: therapeutic apixaban.  Diet: CC / DASH/TLC. Purred/Mod thick liquids per SLP  Dispo: pending Na normalization and stable mood,  MARSHA.  GOC: DNR/DNI.    Communication: updated son-in-law today 7/22

## 2022-07-22 NOTE — PROGRESS NOTE ADULT - PROBLEM SELECTOR PLAN 6
with delirium, worsening since wife was discharged   - repeat EKG today with QTc of 515  - Continue donepezil.  - hold off on PRN haldol given elevated QTc  - c/w 12.5 seroquel HS  - repeat EKG in AM

## 2022-07-22 NOTE — PROGRESS NOTE ADULT - PROBLEM SELECTOR PLAN 8
- A1c is 6.1  - poorly controlled, secondary to steroid induced hyperglycemia  - dexamethasone discontinued 7/19, lantus decreased to 10U per endo  - ISS  - FS qAC/HS.  - Hold oral glycemic control agents.

## 2022-07-22 NOTE — PROGRESS NOTE ADULT - SUBJECTIVE AND OBJECTIVE BOX
Patient is a 89y old  Male who presents with a chief complaint of Increasing confusion, cold like symptoms, covid exposure (21 Jul 2022 13:51)    Jay Davis MD   [facility] Division of Hospital Medicine   Pager [phone]  Reachable on [employer] Teams     SUBJECTIVE / OVERNIGHT EVENTS:  Patient seen and examined this morning. Confused overnight, required 1 dose of haldol.  This morning ate about half his breakfast.      MEDICATIONS  (STANDING):  amLODIPine   Tablet 5 milliGRAM(s) Oral daily  apixaban 2.5 milliGRAM(s) Oral every 12 hours  ascorbic acid 500 milliGRAM(s) Oral daily  dextrose 5% + sodium chloride 0.45%. 1000 milliLiter(s) (75 mL/Hr) IV Continuous <Continuous>  dextrose 5%. 1000 milliLiter(s) (50 mL/Hr) IV Continuous <Continuous>  dextrose 5%. 1000 milliLiter(s) (50 mL/Hr) IV Continuous <Continuous>  dextrose 5%. 1000 milliLiter(s) (100 mL/Hr) IV Continuous <Continuous>  dextrose 5%. 1000 milliLiter(s) (100 mL/Hr) IV Continuous <Continuous>  dextrose 50% Injectable 25 Gram(s) IV Push once  dextrose 50% Injectable 12.5 Gram(s) IV Push once  dextrose 50% Injectable 25 Gram(s) IV Push once  dextrose 50% Injectable 25 Gram(s) IV Push once  dextrose 50% Injectable 12.5 Gram(s) IV Push once  dextrose 50% Injectable 25 Gram(s) IV Push once  donepezil 5 milliGRAM(s) Oral at bedtime  glucagon  Injectable 1 milliGRAM(s) IntraMuscular once  glucagon  Injectable 1 milliGRAM(s) IntraMuscular once  insulin glargine Injectable (LANTUS) 10 Unit(s) SubCutaneous at bedtime  insulin lispro (ADMELOG) corrective regimen sliding scale   SubCutaneous at bedtime  insulin lispro (ADMELOG) corrective regimen sliding scale   SubCutaneous three times a day before meals  melatonin 9 milliGRAM(s) Oral at bedtime  metoprolol tartrate 50 milliGRAM(s) Oral two times a day  multivitamin 1 Tablet(s) Oral daily  pantoprazole    Tablet 40 milliGRAM(s) Oral before breakfast  QUEtiapine 12.5 milliGRAM(s) Oral at bedtime  simvastatin 20 milliGRAM(s) Oral at bedtime    MEDICATIONS  (PRN):  acetaminophen     Tablet .. 650 milliGRAM(s) Oral every 4 hours PRN Temp greater or equal to 38C (100.4F), Severe Pain (7 - 10)  ALBUTerol    90 MICROgram(s) HFA Inhaler 2 Puff(s) Inhalation every 6 hours PRN Bronchospasm  dextrose Oral Gel 15 Gram(s) Oral once PRN Blood Glucose LESS THAN 70 milliGRAM(s)/deciliter  dextrose Oral Gel 15 Gram(s) Oral once PRN Blood Glucose LESS THAN 70 milliGRAM(s)/deciliter  guaiFENesin Oral Liquid (Sugar-Free) 100 milliGRAM(s) Oral every 6 hours PRN Cough  metoprolol tartrate Injectable 5 milliGRAM(s) IV Push every 6 hours PRN sustained HR over 140 bpm      Vital Signs Last 24 Hrs  T(C): 36.9 (22 Jul 2022 11:56), Max: 37.2 (22 Jul 2022 05:40)  T(F): 98.5 (22 Jul 2022 11:56), Max: 98.9 (22 Jul 2022 05:40)  HR: 98 (22 Jul 2022 11:56) (62 - 98)  BP: 131/78 (22 Jul 2022 11:56) (111/66 - 150/65)  BP(mean): --  RR: 20 (22 Jul 2022 11:56) (18 - 20)  SpO2: 96% (22 Jul 2022 11:56) (94% - 100%)  CAPILLARY BLOOD GLUCOSE      POCT Blood Glucose.: 162 mg/dL (22 Jul 2022 12:35)  POCT Blood Glucose.: 108 mg/dL (22 Jul 2022 08:59)  POCT Blood Glucose.: 283 mg/dL (21 Jul 2022 21:25)  POCT Blood Glucose.: 317 mg/dL (21 Jul 2022 17:51)    I&O's Summary      General: frail appearing elderly man laying down in bed appears comfortable in NAD, awake and alert  Eyes:  nonicteric sclera  HENMT: MMM  Respiratory: prominent ribs. No respiratory distress, CTABL, No rales, rhonchi, wheezing.  Cardiovascular: S1,S2; Regular rate and rhythm; No murmurs   Gastrointestinal: scaphoid abdomen, soft, Nontender, Nondistended; +BS.   Extremities: No c/c/e; cool to touch  Psych: AAOx0    LABS:                        10.6   24.94 )-----------( 212      ( 22 Jul 2022 07:15 )             34.1     07-22    146<H>  |  112<H>  |  77<H>  ----------------------------<  138<H>  4.9   |  23  |  1.85<H>    Ca    8.5      22 Jul 2022 07:15  Phos  3.5     07-22  Mg     2.20     07-22                RADIOLOGY & ADDITIONAL TESTS:    Imaging Personally Reviewed:    Consultant(s) Notes Reviewed:      Care Discussed with Consultants/Other Providers:

## 2022-07-22 NOTE — RAPID RESPONSE TEAM SUMMARY - NSSITUATIONBACKGROUNDRRT_GEN_ALL_CORE
89M h/o dementia (A&Ox2 at baseline, DNR.DNI, A-fib (apixaban), DM2, HTN, [place] admitted 7/11 with cough / sneezing / increased confusion after SARS-CoV-2 exposure found to have COVID-19. Course c/b intermittent tachycardia, also with hypernatremia and hyperglycemia in the setting of steroid use. Patient now s/p dexamethasone, and remdesevir, per family AAOx1-2.   RRT called for hypotension 60s/30s with new AMS, non-responsive, rectal temp 99.6F, HR 90s-110s Afib, patient on eliquis.   Labs showing increased leukocytosis, CXR from today no change from admission, UA pending,   TTE previously showed mild-mod decrease in LV systolic function  Gave patient 2L IVF, hydrocortisone 50mg IV, sent cultures and lab work: CBC, CMP, VBG lactate, procal, blood cx.   Gave one dose of Zosyn. IV tylenol for rigoring. Suctioned aggressively.   ABG showed: pH 7.13 pCO2 39 pO2 95 on NRB, lactate 12.2   Started patient on Levo for severe sepsis/septic shock

## 2022-07-22 NOTE — PROGRESS NOTE ADULT - PROBLEM SELECTOR PROBLEM 9
Prophylactic measure
Hypertension
Prophylactic measure

## 2022-07-22 NOTE — PROGRESS NOTE ADULT - ASSESSMENT
89M h/o dementia (A&Ox2 at baseline, A-fib (apixaban), DM2, HTN, [place] admitted 7/11 with cough / sneezing / increased confusion after SARS-CoV-2 exposure found to have COVID-19. Course c/b intermittent tachycardia, also with hypernatremia and hyperglycemia in the setting of steroid use.

## 2022-07-23 LAB
ALBUMIN SERPL ELPH-MCNC: 2 G/DL — LOW (ref 3.3–5)
ALBUMIN SERPL ELPH-MCNC: 2.1 G/DL — LOW (ref 3.3–5)
ALP SERPL-CCNC: 82 U/L — SIGNIFICANT CHANGE UP (ref 40–120)
ALP SERPL-CCNC: 96 U/L — SIGNIFICANT CHANGE UP (ref 40–120)
ALT FLD-CCNC: 1104 U/L — HIGH (ref 4–41)
ALT FLD-CCNC: 990 U/L — HIGH (ref 4–41)
ANION GAP SERPL CALC-SCNC: 15 MMOL/L — HIGH (ref 7–14)
ANION GAP SERPL CALC-SCNC: 16 MMOL/L — HIGH (ref 7–14)
APPEARANCE UR: ABNORMAL
AST SERPL-CCNC: 1916 U/L — HIGH (ref 4–40)
AST SERPL-CCNC: 2427 U/L — HIGH (ref 4–40)
BACTERIA # UR AUTO: ABNORMAL
BASE EXCESS BLDV CALC-SCNC: -0.2 MMOL/L — SIGNIFICANT CHANGE UP (ref -2–3)
BASE EXCESS BLDV CALC-SCNC: -2.1 MMOL/L — LOW (ref -2–3)
BASE EXCESS BLDV CALC-SCNC: -5.1 MMOL/L — LOW (ref -2–3)
BASOPHILS # BLD AUTO: 0 K/UL — SIGNIFICANT CHANGE UP (ref 0–0.2)
BASOPHILS # BLD AUTO: 0.05 K/UL — SIGNIFICANT CHANGE UP (ref 0–0.2)
BASOPHILS NFR BLD AUTO: 0 % — SIGNIFICANT CHANGE UP (ref 0–2)
BASOPHILS NFR BLD AUTO: 0.2 % — SIGNIFICANT CHANGE UP (ref 0–2)
BILIRUB SERPL-MCNC: 0.8 MG/DL — SIGNIFICANT CHANGE UP (ref 0.2–1.2)
BILIRUB SERPL-MCNC: 0.9 MG/DL — SIGNIFICANT CHANGE UP (ref 0.2–1.2)
BILIRUB UR-MCNC: NEGATIVE — SIGNIFICANT CHANGE UP
BLOOD GAS VENOUS COMPREHENSIVE RESULT: SIGNIFICANT CHANGE UP
BUN SERPL-MCNC: 91 MG/DL — HIGH (ref 7–23)
BUN SERPL-MCNC: 92 MG/DL — HIGH (ref 7–23)
CALCIUM SERPL-MCNC: 7.7 MG/DL — LOW (ref 8.4–10.5)
CALCIUM SERPL-MCNC: 7.7 MG/DL — LOW (ref 8.4–10.5)
CHLORIDE BLDV-SCNC: 108 MMOL/L — SIGNIFICANT CHANGE UP (ref 96–108)
CHLORIDE BLDV-SCNC: 111 MMOL/L — HIGH (ref 96–108)
CHLORIDE BLDV-SCNC: 111 MMOL/L — HIGH (ref 96–108)
CHLORIDE SERPL-SCNC: 109 MMOL/L — HIGH (ref 98–107)
CHLORIDE SERPL-SCNC: 109 MMOL/L — HIGH (ref 98–107)
CO2 BLDV-SCNC: 21.8 MMOL/L — LOW (ref 22–26)
CO2 BLDV-SCNC: 25.1 MMOL/L — SIGNIFICANT CHANGE UP (ref 22–26)
CO2 BLDV-SCNC: 27.9 MMOL/L — HIGH (ref 22–26)
CO2 SERPL-SCNC: 19 MMOL/L — LOW (ref 22–31)
CO2 SERPL-SCNC: 24 MMOL/L — SIGNIFICANT CHANGE UP (ref 22–31)
COD CRY URNS QL: SIGNIFICANT CHANGE UP
COLOR SPEC: YELLOW — SIGNIFICANT CHANGE UP
CREAT SERPL-MCNC: 2.5 MG/DL — HIGH (ref 0.5–1.3)
CREAT SERPL-MCNC: 2.79 MG/DL — HIGH (ref 0.5–1.3)
DIFF PNL FLD: ABNORMAL
EGFR: 21 ML/MIN/1.73M2 — LOW
EGFR: 24 ML/MIN/1.73M2 — LOW
EOSINOPHIL # BLD AUTO: 0 K/UL — SIGNIFICANT CHANGE UP (ref 0–0.5)
EOSINOPHIL # BLD AUTO: 0 K/UL — SIGNIFICANT CHANGE UP (ref 0–0.5)
EOSINOPHIL NFR BLD AUTO: 0 % — SIGNIFICANT CHANGE UP (ref 0–6)
EOSINOPHIL NFR BLD AUTO: 0 % — SIGNIFICANT CHANGE UP (ref 0–6)
EPI CELLS # UR: 8 /HPF — HIGH (ref 0–5)
GAS PNL BLDV: 142 MMOL/L — SIGNIFICANT CHANGE UP (ref 136–145)
GAS PNL BLDV: 145 MMOL/L — SIGNIFICANT CHANGE UP (ref 136–145)
GAS PNL BLDV: 146 MMOL/L — HIGH (ref 136–145)
GAS PNL BLDV: SIGNIFICANT CHANGE UP
GAS PNL BLDV: SIGNIFICANT CHANGE UP
GLUCOSE BLDC GLUCOMTR-MCNC: 287 MG/DL — HIGH (ref 70–99)
GLUCOSE BLDC GLUCOMTR-MCNC: 330 MG/DL — HIGH (ref 70–99)
GLUCOSE BLDC GLUCOMTR-MCNC: 330 MG/DL — HIGH (ref 70–99)
GLUCOSE BLDC GLUCOMTR-MCNC: 391 MG/DL — HIGH (ref 70–99)
GLUCOSE BLDV-MCNC: 335 MG/DL — HIGH (ref 70–99)
GLUCOSE BLDV-MCNC: 417 MG/DL — HIGH (ref 70–99)
GLUCOSE BLDV-MCNC: 434 MG/DL — HIGH (ref 70–99)
GLUCOSE SERPL-MCNC: 326 MG/DL — HIGH (ref 70–99)
GLUCOSE SERPL-MCNC: 428 MG/DL — HIGH (ref 70–99)
GLUCOSE UR QL: ABNORMAL
GRAN CASTS # UR COMP ASSIST: SIGNIFICANT CHANGE UP /LPF
HCO3 BLDV-SCNC: 21 MMOL/L — LOW (ref 22–29)
HCO3 BLDV-SCNC: 24 MMOL/L — SIGNIFICANT CHANGE UP (ref 22–29)
HCO3 BLDV-SCNC: 26 MMOL/L — SIGNIFICANT CHANGE UP (ref 22–29)
HCT VFR BLD CALC: 30.3 % — LOW (ref 39–50)
HCT VFR BLD CALC: 30.9 % — LOW (ref 39–50)
HCT VFR BLD CALC: 30.9 % — LOW (ref 39–50)
HCT VFR BLDA CALC: 28 % — LOW (ref 39–51)
HCT VFR BLDA CALC: 29 % — LOW (ref 39–51)
HCT VFR BLDA CALC: 31 % — LOW (ref 39–51)
HGB BLD CALC-MCNC: 10.2 G/DL — LOW (ref 13–17)
HGB BLD CALC-MCNC: 9.4 G/DL — LOW (ref 13–17)
HGB BLD CALC-MCNC: 9.8 G/DL — LOW (ref 13–17)
HGB BLD-MCNC: 9.2 G/DL — LOW (ref 13–17)
HGB BLD-MCNC: 9.8 G/DL — LOW (ref 13–17)
HGB BLD-MCNC: 9.8 G/DL — LOW (ref 13–17)
HYALINE CASTS # UR AUTO: 19 /LPF — HIGH (ref 0–7)
IANC: 21.83 K/UL — HIGH (ref 1.8–7.4)
IANC: 23.18 K/UL — HIGH (ref 1.8–7.4)
IMM GRANULOCYTES NFR BLD AUTO: 1.8 % — HIGH (ref 0–1.5)
KETONES UR-MCNC: NEGATIVE — SIGNIFICANT CHANGE UP
LACTATE BLDV-MCNC: 6.2 MMOL/L — CRITICAL HIGH (ref 0.5–2)
LACTATE BLDV-MCNC: 6.8 MMOL/L — CRITICAL HIGH (ref 0.5–2)
LACTATE BLDV-MCNC: 8.8 MMOL/L — CRITICAL HIGH (ref 0.5–2)
LACTATE SERPL-SCNC: 7 MMOL/L — CRITICAL HIGH (ref 0.5–2)
LACTATE SERPL-SCNC: 8.7 MMOL/L — CRITICAL HIGH (ref 0.5–2)
LEUKOCYTE ESTERASE UR-ACNC: NEGATIVE — SIGNIFICANT CHANGE UP
LYMPHOCYTES # BLD AUTO: 0.42 K/UL — LOW (ref 1–3.3)
LYMPHOCYTES # BLD AUTO: 0.7 K/UL — LOW (ref 1–3.3)
LYMPHOCYTES # BLD AUTO: 1.7 % — LOW (ref 13–44)
LYMPHOCYTES # BLD AUTO: 3 % — LOW (ref 13–44)
MAGNESIUM SERPL-MCNC: 2.2 MG/DL — SIGNIFICANT CHANGE UP (ref 1.6–2.6)
MAGNESIUM SERPL-MCNC: 2.2 MG/DL — SIGNIFICANT CHANGE UP (ref 1.6–2.6)
MCHC RBC-ENTMCNC: 30.4 GM/DL — LOW (ref 32–36)
MCHC RBC-ENTMCNC: 31.7 GM/DL — LOW (ref 32–36)
MCHC RBC-ENTMCNC: 31.7 GM/DL — LOW (ref 32–36)
MCHC RBC-ENTMCNC: 32.1 PG — SIGNIFICANT CHANGE UP (ref 27–34)
MCHC RBC-ENTMCNC: 32.6 PG — SIGNIFICANT CHANGE UP (ref 27–34)
MCHC RBC-ENTMCNC: 32.6 PG — SIGNIFICANT CHANGE UP (ref 27–34)
MCV RBC AUTO: 102.7 FL — HIGH (ref 80–100)
MCV RBC AUTO: 102.7 FL — HIGH (ref 80–100)
MCV RBC AUTO: 105.6 FL — HIGH (ref 80–100)
MONOCYTES # BLD AUTO: 0 K/UL — SIGNIFICANT CHANGE UP (ref 0–0.9)
MONOCYTES # BLD AUTO: 0.22 K/UL — SIGNIFICANT CHANGE UP (ref 0–0.9)
MONOCYTES NFR BLD AUTO: 0 % — LOW (ref 2–14)
MONOCYTES NFR BLD AUTO: 0.9 % — LOW (ref 2–14)
NEUTROPHILS # BLD AUTO: 21.83 K/UL — HIGH (ref 1.8–7.4)
NEUTROPHILS # BLD AUTO: 24.14 K/UL — HIGH (ref 1.8–7.4)
NEUTROPHILS NFR BLD AUTO: 90.5 % — HIGH (ref 43–77)
NEUTROPHILS NFR BLD AUTO: 94.1 % — HIGH (ref 43–77)
NITRITE UR-MCNC: NEGATIVE — SIGNIFICANT CHANGE UP
NRBC # BLD: 0 /100 WBCS — SIGNIFICANT CHANGE UP
NRBC # BLD: 0 /100 WBCS — SIGNIFICANT CHANGE UP
NRBC # FLD: 0.04 K/UL — HIGH
NRBC # FLD: 0.04 K/UL — HIGH
PCO2 BLDV: 40 MMHG — LOW (ref 42–55)
PCO2 BLDV: 44 MMHG — SIGNIFICANT CHANGE UP (ref 42–55)
PCO2 BLDV: 51 MMHG — SIGNIFICANT CHANGE UP (ref 42–55)
PH BLDV: 7.32 — SIGNIFICANT CHANGE UP (ref 7.32–7.43)
PH BLDV: 7.32 — SIGNIFICANT CHANGE UP (ref 7.32–7.43)
PH BLDV: 7.34 — SIGNIFICANT CHANGE UP (ref 7.32–7.43)
PH UR: 5.5 — SIGNIFICANT CHANGE UP (ref 5–8)
PHOSPHATE SERPL-MCNC: 5.2 MG/DL — HIGH (ref 2.5–4.5)
PHOSPHATE SERPL-MCNC: 5.6 MG/DL — HIGH (ref 2.5–4.5)
PLATELET # BLD AUTO: 169 K/UL — SIGNIFICANT CHANGE UP (ref 150–400)
PLATELET # BLD AUTO: 170 K/UL — SIGNIFICANT CHANGE UP (ref 150–400)
PLATELET # BLD AUTO: 170 K/UL — SIGNIFICANT CHANGE UP (ref 150–400)
PO2 BLDV: 21 MMHG — SIGNIFICANT CHANGE UP
PO2 BLDV: 34 MMHG — SIGNIFICANT CHANGE UP
PO2 BLDV: 45 MMHG — SIGNIFICANT CHANGE UP
POTASSIUM BLDV-SCNC: 4.6 MMOL/L — SIGNIFICANT CHANGE UP (ref 3.5–5.1)
POTASSIUM BLDV-SCNC: 5.2 MMOL/L — HIGH (ref 3.5–5.1)
POTASSIUM BLDV-SCNC: 5.8 MMOL/L — HIGH (ref 3.5–5.1)
POTASSIUM SERPL-MCNC: 4.7 MMOL/L — SIGNIFICANT CHANGE UP (ref 3.5–5.3)
POTASSIUM SERPL-MCNC: 5.1 MMOL/L — SIGNIFICANT CHANGE UP (ref 3.5–5.3)
POTASSIUM SERPL-SCNC: 4.7 MMOL/L — SIGNIFICANT CHANGE UP (ref 3.5–5.3)
POTASSIUM SERPL-SCNC: 5.1 MMOL/L — SIGNIFICANT CHANGE UP (ref 3.5–5.3)
PROT SERPL-MCNC: 4.9 G/DL — LOW (ref 6–8.3)
PROT SERPL-MCNC: 5 G/DL — LOW (ref 6–8.3)
PROT UR-MCNC: ABNORMAL
RBC # BLD: 2.87 M/UL — LOW (ref 4.2–5.8)
RBC # BLD: 3.01 M/UL — LOW (ref 4.2–5.8)
RBC # BLD: 3.01 M/UL — LOW (ref 4.2–5.8)
RBC # FLD: 14.9 % — HIGH (ref 10.3–14.5)
RBC # FLD: 14.9 % — HIGH (ref 10.3–14.5)
RBC # FLD: 15 % — HIGH (ref 10.3–14.5)
RBC CASTS # UR COMP ASSIST: 124 /HPF — HIGH (ref 0–4)
SAO2 % BLDV: 23.7 % — SIGNIFICANT CHANGE UP
SAO2 % BLDV: 42.5 % — SIGNIFICANT CHANGE UP
SAO2 % BLDV: 65.7 % — SIGNIFICANT CHANGE UP
SODIUM SERPL-SCNC: 144 MMOL/L — SIGNIFICANT CHANGE UP (ref 135–145)
SODIUM SERPL-SCNC: 148 MMOL/L — HIGH (ref 135–145)
SP GR SPEC: 1.02 — SIGNIFICANT CHANGE UP (ref 1–1.05)
UROBILINOGEN FLD QL: SIGNIFICANT CHANGE UP
VANCOMYCIN FLD-MCNC: <4 UG/ML — SIGNIFICANT CHANGE UP
WBC # BLD: 23.21 K/UL — HIGH (ref 3.8–10.5)
WBC # BLD: 23.21 K/UL — HIGH (ref 3.8–10.5)
WBC # BLD: 24.78 K/UL — HIGH (ref 3.8–10.5)
WBC # FLD AUTO: 23.21 K/UL — HIGH (ref 3.8–10.5)
WBC # FLD AUTO: 23.21 K/UL — HIGH (ref 3.8–10.5)
WBC # FLD AUTO: 24.78 K/UL — HIGH (ref 3.8–10.5)
WBC UR QL: 10 /HPF — HIGH (ref 0–5)

## 2022-07-23 PROCEDURE — 99291 CRITICAL CARE FIRST HOUR: CPT | Mod: GC

## 2022-07-23 RX ORDER — METOPROLOL TARTRATE 50 MG
5 TABLET ORAL ONCE
Refills: 0 | Status: COMPLETED | OUTPATIENT
Start: 2022-07-23 | End: 2022-07-23

## 2022-07-23 RX ORDER — HALOPERIDOL DECANOATE 100 MG/ML
1 INJECTION INTRAMUSCULAR ONCE
Refills: 0 | Status: COMPLETED | OUTPATIENT
Start: 2022-07-23 | End: 2022-07-23

## 2022-07-23 RX ORDER — SODIUM CHLORIDE 9 MG/ML
500 INJECTION, SOLUTION INTRAVENOUS ONCE
Refills: 0 | Status: COMPLETED | OUTPATIENT
Start: 2022-07-23 | End: 2022-07-23

## 2022-07-23 RX ORDER — HYDROCORTISONE 20 MG
50 TABLET ORAL EVERY 6 HOURS
Refills: 0 | Status: DISCONTINUED | OUTPATIENT
Start: 2022-07-23 | End: 2022-07-26

## 2022-07-23 RX ORDER — DEXMEDETOMIDINE HYDROCHLORIDE IN 0.9% SODIUM CHLORIDE 4 UG/ML
0.2 INJECTION INTRAVENOUS
Qty: 400 | Refills: 0 | Status: DISCONTINUED | OUTPATIENT
Start: 2022-07-23 | End: 2022-07-23

## 2022-07-23 RX ORDER — NOREPINEPHRINE BITARTRATE/D5W 8 MG/250ML
0.05 PLASTIC BAG, INJECTION (ML) INTRAVENOUS
Qty: 8 | Refills: 0 | Status: DISCONTINUED | OUTPATIENT
Start: 2022-07-23 | End: 2022-07-23

## 2022-07-23 RX ORDER — VANCOMYCIN HCL 1 G
1000 VIAL (EA) INTRAVENOUS ONCE
Refills: 0 | Status: COMPLETED | OUTPATIENT
Start: 2022-07-23 | End: 2022-07-23

## 2022-07-23 RX ORDER — PIPERACILLIN AND TAZOBACTAM 4; .5 G/20ML; G/20ML
3.38 INJECTION, POWDER, LYOPHILIZED, FOR SOLUTION INTRAVENOUS EVERY 12 HOURS
Refills: 0 | Status: DISCONTINUED | OUTPATIENT
Start: 2022-07-23 | End: 2022-07-28

## 2022-07-23 RX ADMIN — PIPERACILLIN AND TAZOBACTAM 25 GRAM(S): 4; .5 INJECTION, POWDER, LYOPHILIZED, FOR SOLUTION INTRAVENOUS at 08:49

## 2022-07-23 RX ADMIN — Medication 100 MEQ/KG/HR: at 06:18

## 2022-07-23 RX ADMIN — Medication 4: at 17:08

## 2022-07-23 RX ADMIN — Medication 5: at 11:41

## 2022-07-23 RX ADMIN — Medication 6.07 MICROGRAM(S)/KG/MIN: at 06:18

## 2022-07-23 RX ADMIN — QUETIAPINE FUMARATE 12.5 MILLIGRAM(S): 200 TABLET, FILM COATED ORAL at 21:10

## 2022-07-23 RX ADMIN — Medication 50 MILLIGRAM(S): at 06:19

## 2022-07-23 RX ADMIN — Medication 250 MILLIGRAM(S): at 01:17

## 2022-07-23 RX ADMIN — INSULIN GLARGINE 10 UNIT(S): 100 INJECTION, SOLUTION SUBCUTANEOUS at 21:19

## 2022-07-23 RX ADMIN — DONEPEZIL HYDROCHLORIDE 5 MILLIGRAM(S): 10 TABLET, FILM COATED ORAL at 21:10

## 2022-07-23 RX ADMIN — SIMVASTATIN 20 MILLIGRAM(S): 20 TABLET, FILM COATED ORAL at 21:10

## 2022-07-23 RX ADMIN — DEXMEDETOMIDINE HYDROCHLORIDE IN 0.9% SODIUM CHLORIDE 3.24 MICROGRAM(S)/KG/HR: 4 INJECTION INTRAVENOUS at 06:18

## 2022-07-23 RX ADMIN — Medication 100 MEQ/KG/HR: at 20:37

## 2022-07-23 RX ADMIN — Medication 50 MILLIGRAM(S): at 11:42

## 2022-07-23 RX ADMIN — Medication 9 MILLIGRAM(S): at 21:10

## 2022-07-23 RX ADMIN — MIDODRINE HYDROCHLORIDE 20 MILLIGRAM(S): 2.5 TABLET ORAL at 10:40

## 2022-07-23 RX ADMIN — Medication 4: at 06:20

## 2022-07-23 RX ADMIN — Medication 100 MEQ/KG/HR: at 01:04

## 2022-07-23 RX ADMIN — SODIUM CHLORIDE 500 MILLILITER(S): 9 INJECTION, SOLUTION INTRAVENOUS at 15:00

## 2022-07-23 RX ADMIN — Medication 6.07 MICROGRAM(S)/KG/MIN: at 08:48

## 2022-07-23 RX ADMIN — SODIUM CHLORIDE 500 MILLILITER(S): 9 INJECTION, SOLUTION INTRAVENOUS at 17:09

## 2022-07-23 RX ADMIN — Medication 5 MILLIGRAM(S): at 13:10

## 2022-07-23 RX ADMIN — Medication 500 MILLIGRAM(S): at 11:42

## 2022-07-23 RX ADMIN — Medication 50 MILLIGRAM(S): at 17:09

## 2022-07-23 RX ADMIN — PIPERACILLIN AND TAZOBACTAM 25 GRAM(S): 4; .5 INJECTION, POWDER, LYOPHILIZED, FOR SOLUTION INTRAVENOUS at 20:37

## 2022-07-23 RX ADMIN — Medication 5 MILLIGRAM(S): at 12:33

## 2022-07-23 RX ADMIN — Medication 100 MEQ/KG/HR: at 08:49

## 2022-07-23 RX ADMIN — Medication 1 TABLET(S): at 11:42

## 2022-07-23 RX ADMIN — Medication 1: at 21:14

## 2022-07-23 NOTE — GOALS OF CARE CONVERSATION - ADVANCED CARE PLANNING - CONVERSATION DETAILS
D/W daughter who is the HCP and son as well as her  who is an MD> Patient in the MICU in shock on high dose of leveophed with TENZIN , severe metabolic acidosis and lactate of12.     D/w family GOC, patient already DNR and DNI which they would like to continue. D/W family about his renal failure and possibility of renal replacement therapy. After discussion including alternatives, risk and benefit family agrees that HD/RRT is not in the best interest of the patient. Would like a trial of vasopressors and abx.

## 2022-07-23 NOTE — PROGRESS NOTE ADULT - SUBJECTIVE AND OBJECTIVE BOX
CHIEF COMPLAINT: Patient is a 89y old  Male who presents with a chief complaint of Increasing confusion, cold like symptoms, covid exposure (2022 14:29)    Interval Events:    REVIEW OF SYSTEMS:  Constitutional:   Eyes:  ENT:  CV:  Resp:  GI:  :  MSK:  Integumentary:  Neurological:  Psychiatric:  Endocrine:  Hematologic/Lymphatic:  Allergic/Immunologic:  [ ] All other systems negative  [ ] Unable to assess ROS because ________    OBJECTIVE:  ICU Vital Signs Last 24 Hrs  T(C): 37.2 (2022 04:00), Max: 37.7 (2022 22:01)  T(F): 99 (2022 04:00), Max: 99.8 (2022 22:01)  HR: 93 (2022 07:43) (84 - 125)  BP: 129/63 (2022 07:43) (64/33 - 190/101)  BP(mean): 82 (2022 07:43) (43 - 115)  ABP: --  ABP(mean): --  RR: 20 (2022 07:43) (18 - 41)  SpO2: 100% (2022 07:43) (85% - 100%)    O2 Parameters below as of 2022 04:00  Patient On (Oxygen Delivery Method): nasal cannula               @ 07:01  -   @ 07:00  --------------------------------------------------------  IN: 1723 mL / OUT: 741 mL / NET: 982 mL      CAPILLARY BLOOD GLUCOSE      POCT Blood Glucose.: 330 mg/dL (2022 06:01)      PHYSICAL EXAM:  General: NAD, doing well.  HEENT: Intubated. OG in place. Brainstem reflexes in place  Lymph Nodes: No TRAV palpated  Neck: trachea midline. no trach.   Respiratory: CTA b/l. No rales, rhonchi, wheezing appreciated.  Cardiovascular: RRR. +s1/s2, -s3/s4. No murmur, rubs, gallops. No edema  Abdomen: NT/ND. +BS, No HSM.   Extremities: 2+ pulses  Skin: edematous. No rashes, ecchymoses, or petechiae noted  Neurological: AAOx3. DTR 2+. strength 5/5 UE/LE bilaterally.   Psychiatry: Appropriate mood and affect.    HOSPITAL MEDICATIONS:  MEDICATIONS  (STANDING):  amLODIPine   Tablet 5 milliGRAM(s) Oral daily  ascorbic acid 500 milliGRAM(s) Oral daily  dexMEDEtomidine Infusion 0.2 MICROgram(s)/kG/Hr (3.24 mL/Hr) IV Continuous <Continuous>  dextrose 5%. 1000 milliLiter(s) (100 mL/Hr) IV Continuous <Continuous>  dextrose 5%. 1000 milliLiter(s) (50 mL/Hr) IV Continuous <Continuous>  dextrose 5%. 1000 milliLiter(s) (100 mL/Hr) IV Continuous <Continuous>  dextrose 5%. 1000 milliLiter(s) (50 mL/Hr) IV Continuous <Continuous>  dextrose 50% Injectable 25 Gram(s) IV Push once  dextrose 50% Injectable 12.5 Gram(s) IV Push once  dextrose 50% Injectable 25 Gram(s) IV Push once  dextrose 50% Injectable 25 Gram(s) IV Push once  dextrose 50% Injectable 12.5 Gram(s) IV Push once  dextrose 50% Injectable 25 Gram(s) IV Push once  donepezil 5 milliGRAM(s) Oral at bedtime  glucagon  Injectable 1 milliGRAM(s) IntraMuscular once  glucagon  Injectable 1 milliGRAM(s) IntraMuscular once  hydrocortisone sodium succinate Injectable 50 milliGRAM(s) IV Push every 6 hours  insulin glargine Injectable (LANTUS) 10 Unit(s) SubCutaneous at bedtime  insulin lispro (ADMELOG) corrective regimen sliding scale   SubCutaneous at bedtime  insulin lispro (ADMELOG) corrective regimen sliding scale   SubCutaneous three times a day before meals  melatonin 9 milliGRAM(s) Oral at bedtime  metoprolol tartrate 50 milliGRAM(s) Oral two times a day  midodrine. 20 milliGRAM(s) Oral once  multivitamin 1 Tablet(s) Oral daily  norepinephrine Infusion 0.05 MICROgram(s)/kG/Min (6.07 mL/Hr) IV Continuous <Continuous>  pantoprazole    Tablet 40 milliGRAM(s) Oral before breakfast  piperacillin/tazobactam IVPB.. 3.375 Gram(s) IV Intermittent every 12 hours  QUEtiapine 12.5 milliGRAM(s) Oral at bedtime  simvastatin 20 milliGRAM(s) Oral at bedtime  sodium bicarbonate  Infusion 0.232 mEq/kG/Hr (100 mL/Hr) IV Continuous <Continuous>    MEDICATIONS  (PRN):  acetaminophen     Tablet .. 650 milliGRAM(s) Oral every 4 hours PRN Temp greater or equal to 38C (100.4F), Severe Pain (7 - 10)  ALBUTerol    90 MICROgram(s) HFA Inhaler 2 Puff(s) Inhalation every 6 hours PRN Bronchospasm  dextrose Oral Gel 15 Gram(s) Oral once PRN Blood Glucose LESS THAN 70 milliGRAM(s)/deciliter  dextrose Oral Gel 15 Gram(s) Oral once PRN Blood Glucose LESS THAN 70 milliGRAM(s)/deciliter  guaiFENesin Oral Liquid (Sugar-Free) 100 milliGRAM(s) Oral every 6 hours PRN Cough  metoprolol tartrate Injectable 5 milliGRAM(s) IV Push every 6 hours PRN sustained HR over 140 bpm      LABS:  ( @ 06:23)                        9.8  23.21 )-----------( 170                 30.9    Neutrophils = 21.83 (94.1%)  Lymphocytes = 0.70 (3.0%)  Eosinophils = 0.00 (0.0%)  Basophils = 0.05 (0.2%)  Monocytes = 0.22 (0.9%)  Bands = --%    WBC Trend: 23.21<--, 24.78<--, 19.11<--  Hb Trend: 9.8<--, 9.2<--, 9.2<--, 10.6<--, 12.9<--  Plt Trend: 170<--, 169<--, 179<--, 212<--, 198<--      144  |  109<H>  |  92<H>  ----------------------------<  326<H>  5.1   |  19<L>  |  2.79<H>    Ca    7.7<L>      2022 02:30  Phos  5.2       Mg     2.20     07-23    TPro  4.9<L>  /  Alb  2.0<L>  /  TBili  0.9  /  DBili  x   /  AST  2427<H>  /  ALT  990<H>  /  AlkPhos  82      Creatinine Trend: 2.79<--, 2.92<--, 1.85<--, 1.16<--, 1.34<--, 1.36<--    Urinalysis Basic - ( 2022 00:25 )    Color: Yellow / Appearance: Slightly Turbid / S.022 / pH: x  Gluc: x / Ketone: Negative  / Bili: Negative / Urobili: <2 mg/dL   Blood: x / Protein: 30 mg/dL / Nitrite: Negative   Leuk Esterase: Negative / RBC: 124 /HPF / WBC 10 /HPF   Sq Epi: x / Non Sq Epi: 8 /HPF / Bacteria: Occasional      Arterial Blood Gas:   @ 20:23  7.13/39/95/13/97.5/-15.2  ABG lactate: --    Venous Blood Gas:   @ 06:23  7.34/44/21/24/23.7  VBG Lactate: 8.8  Venous Blood Gas:   @ 02:47  7.32/40/45/21/65.7  VBG Lactate: 6.2    CARDIAC MARKERS ( 2022 20:10 )  Trop x     /  U/L / CKMB x             MICROBIOLOGY:   Blood Cx:  Urine Cx:  Sputum Cx:  Legionella:  RVP:    RADIOLOGY:  X Ray:  CT:  MRI:  Ultrasound:  [ ] Reviewed and interpreted by me    EKG:   CHIEF COMPLAINT: Patient is a 89y old  Male who presents with a chief complaint of Increasing confusion, cold like symptoms, covid exposure (2022 14:29)    Interval Events: weaning from sedation and becoming agitated, non-conversational, hypertensive to 240/190 got metoprolol 5 IV, off pressors    REVIEW OF SYSTEMS:   [X] Unable to assess ROS because of agitation and AMS    OBJECTIVE:  ICU Vital Signs Last 24 Hrs  T(C): 37.2 (2022 04:00), Max: 37.7 (2022 22:01)  T(F): 99 (2022 04:00), Max: 99.8 (2022 22:01)  HR: 93 (2022 07:43) (84 - 125)  BP: 129/63 (2022 07:43) (64/33 - 190/101)  BP(mean): 82 (2022 07:43) (43 - 115)  ABP: --  ABP(mean): --  RR: 20 (2022 07:43) (18 - 41)  SpO2: 100% (2022 07:43) (85% - 100%)    O2 Parameters below as of 2022 04:00  Patient On (Oxygen Delivery Method): nasal cannula          - @ 07:01  -   @ 07:00  --------------------------------------------------------  IN: 1723 mL / OUT: 741 mL / NET: 982 mL      CAPILLARY BLOOD GLUCOSE      POCT Blood Glucose.: 330 mg/dL (2022 06:01)      PHYSICAL EXAM:  General: Agitated off sedation and pressors  HEENT: Brainstem reflexes in place, protecting airway,  Lymph Nodes: No TRAV palpated  Neck: trachea midline. no trach.   Respiratory: CTA b/l. No rales, rhonchi, wheezing appreciated.  Cardiovascular: RRR. +s1/s2, -s3/s4. No murmur, rubs, gallops. No edema  Abdomen: NT/ND. +BS, No HSM.   Extremities: 2+ pulses  Skin: edematous. No rashes, ecchymoses, or petechiae noted  Neurological: AAOx0. Psychiatry: Appropriate mood and affect.    HOSPITAL MEDICATIONS:  MEDICATIONS  (STANDING):  amLODIPine   Tablet 5 milliGRAM(s) Oral daily  ascorbic acid 500 milliGRAM(s) Oral daily  dexMEDEtomidine Infusion 0.2 MICROgram(s)/kG/Hr (3.24 mL/Hr) IV Continuous <Continuous>  dextrose 5%. 1000 milliLiter(s) (100 mL/Hr) IV Continuous <Continuous>  dextrose 5%. 1000 milliLiter(s) (50 mL/Hr) IV Continuous <Continuous>  dextrose 5%. 1000 milliLiter(s) (100 mL/Hr) IV Continuous <Continuous>  dextrose 5%. 1000 milliLiter(s) (50 mL/Hr) IV Continuous <Continuous>  dextrose 50% Injectable 25 Gram(s) IV Push once  dextrose 50% Injectable 12.5 Gram(s) IV Push once  dextrose 50% Injectable 25 Gram(s) IV Push once  dextrose 50% Injectable 25 Gram(s) IV Push once  dextrose 50% Injectable 12.5 Gram(s) IV Push once  dextrose 50% Injectable 25 Gram(s) IV Push once  donepezil 5 milliGRAM(s) Oral at bedtime  glucagon  Injectable 1 milliGRAM(s) IntraMuscular once  glucagon  Injectable 1 milliGRAM(s) IntraMuscular once  hydrocortisone sodium succinate Injectable 50 milliGRAM(s) IV Push every 6 hours  insulin glargine Injectable (LANTUS) 10 Unit(s) SubCutaneous at bedtime  insulin lispro (ADMELOG) corrective regimen sliding scale   SubCutaneous at bedtime  insulin lispro (ADMELOG) corrective regimen sliding scale   SubCutaneous three times a day before meals  melatonin 9 milliGRAM(s) Oral at bedtime  metoprolol tartrate 50 milliGRAM(s) Oral two times a day  midodrine. 20 milliGRAM(s) Oral once  multivitamin 1 Tablet(s) Oral daily  norepinephrine Infusion 0.05 MICROgram(s)/kG/Min (6.07 mL/Hr) IV Continuous <Continuous>  pantoprazole    Tablet 40 milliGRAM(s) Oral before breakfast  piperacillin/tazobactam IVPB.. 3.375 Gram(s) IV Intermittent every 12 hours  QUEtiapine 12.5 milliGRAM(s) Oral at bedtime  simvastatin 20 milliGRAM(s) Oral at bedtime  sodium bicarbonate  Infusion 0.232 mEq/kG/Hr (100 mL/Hr) IV Continuous <Continuous>    MEDICATIONS  (PRN):  acetaminophen     Tablet .. 650 milliGRAM(s) Oral every 4 hours PRN Temp greater or equal to 38C (100.4F), Severe Pain (7 - 10)  ALBUTerol    90 MICROgram(s) HFA Inhaler 2 Puff(s) Inhalation every 6 hours PRN Bronchospasm  dextrose Oral Gel 15 Gram(s) Oral once PRN Blood Glucose LESS THAN 70 milliGRAM(s)/deciliter  dextrose Oral Gel 15 Gram(s) Oral once PRN Blood Glucose LESS THAN 70 milliGRAM(s)/deciliter  guaiFENesin Oral Liquid (Sugar-Free) 100 milliGRAM(s) Oral every 6 hours PRN Cough  metoprolol tartrate Injectable 5 milliGRAM(s) IV Push every 6 hours PRN sustained HR over 140 bpm      LABS:  ( @ 06:23)                        9.8  23.21 )-----------( 170                 30.9    Neutrophils = 21.83 (94.1%)  Lymphocytes = 0.70 (3.0%)  Eosinophils = 0.00 (0.0%)  Basophils = 0.05 (0.2%)  Monocytes = 0.22 (0.9%)  Bands = --%    WBC Trend: 23.21<--, 24.78<--, 19.11<--  Hb Trend: 9.8<--, 9.2<--, 9.2<--, 10.6<--, 12.9<--  Plt Trend: 170<--, 169<--, 179<--, 212<--, 198<--      144  |  109<H>  |  92<H>  ----------------------------<  326<H>  5.1   |  19<L>  |  2.79<H>    Ca    7.7<L>      2022 02:30  Phos  5.2       Mg     2.20         TPro  4.9<L>  /  Alb  2.0<L>  /  TBili  0.9  /  DBili  x   /  AST  2427<H>  /  ALT  990<H>  /  AlkPhos  82      Creatinine Trend: 2.79<--, 2.92<--, 1.85<--, 1.16<--, 1.34<--, 1.36<--    Urinalysis Basic - ( 2022 00:25 )    Color: Yellow / Appearance: Slightly Turbid / S.022 / pH: x  Gluc: x / Ketone: Negative  / Bili: Negative / Urobili: <2 mg/dL   Blood: x / Protein: 30 mg/dL / Nitrite: Negative   Leuk Esterase: Negative / RBC: 124 /HPF / WBC 10 /HPF   Sq Epi: x / Non Sq Epi: 8 /HPF / Bacteria: Occasional      Arterial Blood Gas:   @ 20:23  7.13/39/95/13/97.5/-15.2  ABG lactate: --    Venous Blood Gas:   @ 06:23  7.34/44/21/24/23.7  VBG Lactate: 8.8  Venous Blood Gas:   @ 02:47  7.32/40/45/21/65.7  VBG Lactate: 6.2    CARDIAC MARKERS ( 2022 20:10 )  Trop x     /  U/L / CKMB x             MICROBIOLOGY:   Blood Cx:  Urine Cx:  Sputum Cx:  Legionella:  RVP:    RADIOLOGY:  X Ray:  CT:  MRI:  Ultrasound:  [ ] Reviewed and interpreted by me    EKG:   CHIEF COMPLAINT: Patient is a 89y old  Male who presents with a chief complaint of Increasing confusion, cold like symptoms, covid exposure (2022 14:29)    Interval Events: weaning from sedation and becoming agitated, non-conversational, hypertensive to 240/190 got metoprolol 5 IV, off pressors. Had difficulty with pureed diet, so replaced on NPO    REVIEW OF SYSTEMS:   [X] Unable to assess ROS because of agitation and AMS    OBJECTIVE:  ICU Vital Signs Last 24 Hrs  T(C): 37.2 (2022 04:00), Max: 37.7 (2022 22:01)  T(F): 99 (2022 04:00), Max: 99.8 (2022 22:01)  HR: 93 (2022 07:43) (84 - 125)  BP: 129/63 (2022 07:43) (64/33 - 190/101)  BP(mean): 82 (2022 07:43) (43 - 115)  ABP: --  ABP(mean): --  RR: 20 (2022 07:43) (18 - 41)  SpO2: 100% (2022 07:43) (85% - 100%)    O2 Parameters below as of 2022 04:00  Patient On (Oxygen Delivery Method): nasal cannula           @ 07:01  -   @ 07:00  --------------------------------------------------------  IN: 1723 mL / OUT: 741 mL / NET: 982 mL      CAPILLARY BLOOD GLUCOSE      POCT Blood Glucose.: 330 mg/dL (2022 06:01)      PHYSICAL EXAM:  General: Agitated off sedation and pressors  HEENT: Brainstem reflexes in place, protecting airway,  Lymph Nodes: No TRAV palpated  Neck: trachea midline. no trach.   Respiratory: CTA b/l. No rales, rhonchi, wheezing appreciated.  Cardiovascular: RRR. +s1/s2, -s3/s4. No murmur, rubs, gallops. No edema  Abdomen: NT/ND. +BS, No HSM.   Extremities: 2+ pulses  Skin: edematous. No rashes, ecchymoses, or petechiae noted  Neurological: AAOx0. Psychiatry: Appropriate mood and affect.    HOSPITAL MEDICATIONS:  MEDICATIONS  (STANDING):  amLODIPine   Tablet 5 milliGRAM(s) Oral daily  ascorbic acid 500 milliGRAM(s) Oral daily  dexMEDEtomidine Infusion 0.2 MICROgram(s)/kG/Hr (3.24 mL/Hr) IV Continuous <Continuous>  dextrose 5%. 1000 milliLiter(s) (100 mL/Hr) IV Continuous <Continuous>  dextrose 5%. 1000 milliLiter(s) (50 mL/Hr) IV Continuous <Continuous>  dextrose 5%. 1000 milliLiter(s) (100 mL/Hr) IV Continuous <Continuous>  dextrose 5%. 1000 milliLiter(s) (50 mL/Hr) IV Continuous <Continuous>  dextrose 50% Injectable 25 Gram(s) IV Push once  dextrose 50% Injectable 12.5 Gram(s) IV Push once  dextrose 50% Injectable 25 Gram(s) IV Push once  dextrose 50% Injectable 25 Gram(s) IV Push once  dextrose 50% Injectable 12.5 Gram(s) IV Push once  dextrose 50% Injectable 25 Gram(s) IV Push once  donepezil 5 milliGRAM(s) Oral at bedtime  glucagon  Injectable 1 milliGRAM(s) IntraMuscular once  glucagon  Injectable 1 milliGRAM(s) IntraMuscular once  hydrocortisone sodium succinate Injectable 50 milliGRAM(s) IV Push every 6 hours  insulin glargine Injectable (LANTUS) 10 Unit(s) SubCutaneous at bedtime  insulin lispro (ADMELOG) corrective regimen sliding scale   SubCutaneous at bedtime  insulin lispro (ADMELOG) corrective regimen sliding scale   SubCutaneous three times a day before meals  melatonin 9 milliGRAM(s) Oral at bedtime  metoprolol tartrate 50 milliGRAM(s) Oral two times a day  midodrine. 20 milliGRAM(s) Oral once  multivitamin 1 Tablet(s) Oral daily  norepinephrine Infusion 0.05 MICROgram(s)/kG/Min (6.07 mL/Hr) IV Continuous <Continuous>  pantoprazole    Tablet 40 milliGRAM(s) Oral before breakfast  piperacillin/tazobactam IVPB.. 3.375 Gram(s) IV Intermittent every 12 hours  QUEtiapine 12.5 milliGRAM(s) Oral at bedtime  simvastatin 20 milliGRAM(s) Oral at bedtime  sodium bicarbonate  Infusion 0.232 mEq/kG/Hr (100 mL/Hr) IV Continuous <Continuous>    MEDICATIONS  (PRN):  acetaminophen     Tablet .. 650 milliGRAM(s) Oral every 4 hours PRN Temp greater or equal to 38C (100.4F), Severe Pain (7 - 10)  ALBUTerol    90 MICROgram(s) HFA Inhaler 2 Puff(s) Inhalation every 6 hours PRN Bronchospasm  dextrose Oral Gel 15 Gram(s) Oral once PRN Blood Glucose LESS THAN 70 milliGRAM(s)/deciliter  dextrose Oral Gel 15 Gram(s) Oral once PRN Blood Glucose LESS THAN 70 milliGRAM(s)/deciliter  guaiFENesin Oral Liquid (Sugar-Free) 100 milliGRAM(s) Oral every 6 hours PRN Cough  metoprolol tartrate Injectable 5 milliGRAM(s) IV Push every 6 hours PRN sustained HR over 140 bpm      LABS:  ( @ 06:23)                        9.8  23.21 )-----------( 170                 30.9    Neutrophils = 21.83 (94.1%)  Lymphocytes = 0.70 (3.0%)  Eosinophils = 0.00 (0.0%)  Basophils = 0.05 (0.2%)  Monocytes = 0.22 (0.9%)  Bands = --%    WBC Trend: 23.21<--, 24.78<--, 19.11<--  Hb Trend: 9.8<--, 9.2<--, 9.2<--, 10.6<--, 12.9<--  Plt Trend: 170<--, 169<--, 179<--, 212<--, 198<--      144  |  109<H>  |  92<H>  ----------------------------<  326<H>  5.1   |  19<L>  |  2.79<H>    Ca    7.7<L>      2022 02:30  Phos  5.2       Mg     2.20         TPro  4.9<L>  /  Alb  2.0<L>  /  TBili  0.9  /  DBili  x   /  AST  2427<H>  /  ALT  990<H>  /  AlkPhos  82      Creatinine Trend: 2.79<--, 2.92<--, 1.85<--, 1.16<--, 1.34<--, 1.36<--    Urinalysis Basic - ( 2022 00:25 )    Color: Yellow / Appearance: Slightly Turbid / S.022 / pH: x  Gluc: x / Ketone: Negative  / Bili: Negative / Urobili: <2 mg/dL   Blood: x / Protein: 30 mg/dL / Nitrite: Negative   Leuk Esterase: Negative / RBC: 124 /HPF / WBC 10 /HPF   Sq Epi: x / Non Sq Epi: 8 /HPF / Bacteria: Occasional      Arterial Blood Gas:   @ 20:23  7.13/39/95/13/97.5/-15.2  ABG lactate: --    Venous Blood Gas:   @ 06:23  7.34/44/21/24/23.7  VBG Lactate: 8.8  Venous Blood Gas:   @ 02:47  7.32/40/45/21/65.7  VBG Lactate: 6.2    CARDIAC MARKERS ( 2022 20:10 )  Trop x     /  U/L / CKMB x             MICROBIOLOGY:   Blood Cx:  Urine Cx:  Sputum Cx:  Legionella:  RVP:    RADIOLOGY:  X Ray:  CT:  MRI:  Ultrasound:  [ ] Reviewed and interpreted by me    EKG:

## 2022-07-23 NOTE — CHART NOTE - NSCHARTNOTEFT_GEN_A_CORE
: Dae Hyeon Kim    INDICATION: Shock ,hypotension    PROCEDURE:  [ x] LIMITED ECHO  [ x] LIMITED CHEST  [ ] LIMITED RETROPERITONEAL  [ ] LIMITED ABDOMINAL  [ ] LIMITED DVT  [ ] NEEDLE GUIDANCE VASCULAR  [ ] NEEDLE GUIDANCE THORACENTESIS  [ ] NEEDLE GUIDANCE PARACENTESIS  [ ] NEEDLE GUIDANCE PERICARDIOCENTESIS  [ ] OTHER    FINDINGS:  Alines anteriorly, no consolidations or plefs  Normal LVSF, hyperdynamic, RV smaller vs LV, no pericardial effusion  IVC < 1cm, fully collapsed with respiration.       INTERPRETATION:  Clear lungs  Grossly normal LVSF  Tiny IVC possible volume down  Hypovolemic shock.     Images uploaded on [employer]

## 2022-07-23 NOTE — PROGRESS NOTE ADULT - ASSESSMENT
89M PMHx dementia (AAO x 2 at baseline), Afib on eliquis, DM, HTN, [place], recently COVID-19+ on 7/10 s/p Dexamethasone and Remdesivir, presenting for AMS with worsening confusion and decreased responsiveness, found to be in septic shock secondary to acute metabolic acidosis vs aspiration PNA    Neuro  #baseline dementia  -now appearing more altered likely iso toxic metabolic encephalopathy  -will check CT head to r/o structural causes    Pulm  #DDx aspiration PNA  -on NRB  -CXR clear  -possible aspiration PNA given consolidation over right lung base, will cover broadly with Vanc, Zosyn    Cardiac  -wean pressor requirements as tolerated  -on stress dose steroids  -held OAc pending imaging  -held antihypertensives in setting of hypotension    GI  -NPO given possible aspiration  -signs of shock liver, continue to monitor  -given limited clinical findings, will check CT abdomen for occult fluid collections/infectious source    Renal  #TENZIN, prerenal  #severe metabolic acidosis  -will start bicarb bolus and gtt to alleviate both hypovolemia and acidosis  -trend CMP    Endo  #DM  -FS q6h and SSI    ID  #Septic shock with uncertain cause  -Will cover DDx aspiration PNA broadly with Vanc and Zosyn  -Blood cultures x 2  -Trend CBC and lactate  -CTH and CT abdomen pending  -UA unremarkable for infectious cause    Heme/Onc  -held OAc pending imaging    PPx  -SCD for DVT    GOC  -D/w family, plan to pursue trial of pressor support and abx, forgoing HD/RRT     89M PMHx dementia (AAO x 2 at baseline), Afib on eliquis, DM, HTN, [place], recently COVID-19+ on 7/10 s/p Dexamethasone and Remdesivir, presenting for AMS with worsening confusion and decreased responsiveness, found to be in septic shock secondary to acute metabolic acidosis vs aspiration PNA    Neuro  #baseline dementia  -now appearing more altered likely iso toxic metabolic encephalopathy  -will check CT head to r/o structural causes  - Agitation 2/2 baseline dementia - haldol PRN    Pulm  #DDx aspiration PNA  -on NRB  -CXR clear  -possible aspiration PNA given consolidation over right lung base, will cover broadly with Vanc, Zosyn    Cardiac  -wean pressor requirements as tolerated  -on stress dose steroids  -held OAc pending imaging  -held antihypertensives in setting of hypotension  - add midodrine when taking PO and having improvement in mentation.    #hypertensive urgency  - received metoprolol 5 IV for BP of 240/190 corrected to 88/44    GI  -NPO given possible aspiration  -signs of shock liver, continue to monitor  -given limited clinical findings, will check CT abdomen for occult fluid collections/infectious source    Renal  #TENZIN, prerenal  #severe metabolic acidosis  -will start bicarb bolus and gtt to alleviate both hypovolemia and acidosis  -trend CMP    Endo  #DM  -FS q6h and SSI    ID  #Septic shock with uncertain cause  -Will cover DDx aspiration PNA broadly with Vanc and Zosyn  -Blood cultures x 2  -Trend CBC and lactate  -CTH and CT abdomen pending  -UA unremarkable for infectious cause    Heme/Onc  -held OAc pending imaging    PPx  -SCD for DVT    GOC  -D/w family, plan to pursue trial of pressor support and abx, forgoing HD/RRT

## 2022-07-24 LAB
ALBUMIN SERPL ELPH-MCNC: 1.6 G/DL — LOW (ref 3.3–5)
ALP SERPL-CCNC: 71 U/L — SIGNIFICANT CHANGE UP (ref 40–120)
ALT FLD-CCNC: 779 U/L — HIGH (ref 4–41)
ANION GAP SERPL CALC-SCNC: 8 MMOL/L — SIGNIFICANT CHANGE UP (ref 7–14)
AST SERPL-CCNC: 795 U/L — HIGH (ref 4–40)
BASOPHILS # BLD AUTO: 0.01 K/UL — SIGNIFICANT CHANGE UP (ref 0–0.2)
BASOPHILS NFR BLD AUTO: 0.1 % — SIGNIFICANT CHANGE UP (ref 0–2)
BILIRUB SERPL-MCNC: 0.6 MG/DL — SIGNIFICANT CHANGE UP (ref 0.2–1.2)
BUN SERPL-MCNC: 67 MG/DL — HIGH (ref 7–23)
CALCIUM SERPL-MCNC: 7.1 MG/DL — LOW (ref 8.4–10.5)
CHLORIDE SERPL-SCNC: 104 MMOL/L — SIGNIFICANT CHANGE UP (ref 98–107)
CO2 SERPL-SCNC: 35 MMOL/L — HIGH (ref 22–31)
CREAT SERPL-MCNC: 1.72 MG/DL — HIGH (ref 0.5–1.3)
EGFR: 38 ML/MIN/1.73M2 — LOW
EOSINOPHIL # BLD AUTO: 0 K/UL — SIGNIFICANT CHANGE UP (ref 0–0.5)
EOSINOPHIL NFR BLD AUTO: 0 % — SIGNIFICANT CHANGE UP (ref 0–6)
GAS PNL BLDV: SIGNIFICANT CHANGE UP
GLUCOSE BLDC GLUCOMTR-MCNC: 173 MG/DL — HIGH (ref 70–99)
GLUCOSE BLDC GLUCOMTR-MCNC: 196 MG/DL — HIGH (ref 70–99)
GLUCOSE BLDC GLUCOMTR-MCNC: 242 MG/DL — HIGH (ref 70–99)
GLUCOSE BLDC GLUCOMTR-MCNC: 267 MG/DL — HIGH (ref 70–99)
GLUCOSE SERPL-MCNC: 290 MG/DL — HIGH (ref 70–99)
HCT VFR BLD CALC: 23.1 % — LOW (ref 39–50)
HCT VFR BLD CALC: 25.5 % — LOW (ref 39–50)
HGB BLD-MCNC: 7.4 G/DL — LOW (ref 13–17)
HGB BLD-MCNC: 8.3 G/DL — LOW (ref 13–17)
IANC: 10.33 K/UL — HIGH (ref 1.8–7.4)
IMM GRANULOCYTES NFR BLD AUTO: 1.7 % — HIGH (ref 0–1.5)
LYMPHOCYTES # BLD AUTO: 0.34 K/UL — LOW (ref 1–3.3)
LYMPHOCYTES # BLD AUTO: 3.1 % — LOW (ref 13–44)
MAGNESIUM SERPL-MCNC: 2 MG/DL — SIGNIFICANT CHANGE UP (ref 1.6–2.6)
MCHC RBC-ENTMCNC: 32 GM/DL — SIGNIFICANT CHANGE UP (ref 32–36)
MCHC RBC-ENTMCNC: 32.3 PG — SIGNIFICANT CHANGE UP (ref 27–34)
MCHC RBC-ENTMCNC: 32.5 GM/DL — SIGNIFICANT CHANGE UP (ref 32–36)
MCHC RBC-ENTMCNC: 33.5 PG — SIGNIFICANT CHANGE UP (ref 27–34)
MCV RBC AUTO: 100.9 FL — HIGH (ref 80–100)
MCV RBC AUTO: 102.8 FL — HIGH (ref 80–100)
MONOCYTES # BLD AUTO: 0.14 K/UL — SIGNIFICANT CHANGE UP (ref 0–0.9)
MONOCYTES NFR BLD AUTO: 1.3 % — LOW (ref 2–14)
NEUTROPHILS # BLD AUTO: 10.33 K/UL — HIGH (ref 1.8–7.4)
NEUTROPHILS NFR BLD AUTO: 93.8 % — HIGH (ref 43–77)
NRBC # BLD: 0 /100 WBCS — SIGNIFICANT CHANGE UP
NRBC # BLD: 0 /100 WBCS — SIGNIFICANT CHANGE UP
NRBC # FLD: 0 K/UL — SIGNIFICANT CHANGE UP
NRBC # FLD: 0.02 K/UL — HIGH
PHOSPHATE SERPL-MCNC: 2.7 MG/DL — SIGNIFICANT CHANGE UP (ref 2.5–4.5)
PLATELET # BLD AUTO: 108 K/UL — LOW (ref 150–400)
PLATELET # BLD AUTO: 111 K/UL — LOW (ref 150–400)
POTASSIUM SERPL-MCNC: 3.1 MMOL/L — LOW (ref 3.5–5.3)
POTASSIUM SERPL-SCNC: 3.1 MMOL/L — LOW (ref 3.5–5.3)
PROT SERPL-MCNC: 4.1 G/DL — LOW (ref 6–8.3)
RBC # BLD: 2.29 M/UL — LOW (ref 4.2–5.8)
RBC # BLD: 2.48 M/UL — LOW (ref 4.2–5.8)
RBC # FLD: 14.7 % — HIGH (ref 10.3–14.5)
RBC # FLD: 14.8 % — HIGH (ref 10.3–14.5)
SODIUM SERPL-SCNC: 147 MMOL/L — HIGH (ref 135–145)
WBC # BLD: 11.01 K/UL — HIGH (ref 3.8–10.5)
WBC # BLD: 12.34 K/UL — HIGH (ref 3.8–10.5)
WBC # FLD AUTO: 11.01 K/UL — HIGH (ref 3.8–10.5)
WBC # FLD AUTO: 12.34 K/UL — HIGH (ref 3.8–10.5)

## 2022-07-24 PROCEDURE — 99291 CRITICAL CARE FIRST HOUR: CPT

## 2022-07-24 RX ORDER — POTASSIUM CHLORIDE 20 MEQ
10 PACKET (EA) ORAL
Refills: 0 | Status: COMPLETED | OUTPATIENT
Start: 2022-07-24 | End: 2022-07-24

## 2022-07-24 RX ORDER — INSULIN LISPRO 100/ML
VIAL (ML) SUBCUTANEOUS EVERY 6 HOURS
Refills: 0 | Status: DISCONTINUED | OUTPATIENT
Start: 2022-07-24 | End: 2022-07-28

## 2022-07-24 RX ADMIN — Medication 100 MILLIEQUIVALENT(S): at 08:35

## 2022-07-24 RX ADMIN — Medication 3: at 05:46

## 2022-07-24 RX ADMIN — Medication 50 MILLIGRAM(S): at 12:23

## 2022-07-24 RX ADMIN — Medication 50 MILLIGRAM(S): at 00:15

## 2022-07-24 RX ADMIN — PANTOPRAZOLE SODIUM 40 MILLIGRAM(S): 20 TABLET, DELAYED RELEASE ORAL at 05:25

## 2022-07-24 RX ADMIN — Medication 100 MILLIEQUIVALENT(S): at 06:46

## 2022-07-24 RX ADMIN — Medication 50 MILLIGRAM(S): at 17:53

## 2022-07-24 RX ADMIN — Medication 50 MILLIGRAM(S): at 23:13

## 2022-07-24 RX ADMIN — PIPERACILLIN AND TAZOBACTAM 25 GRAM(S): 4; .5 INJECTION, POWDER, LYOPHILIZED, FOR SOLUTION INTRAVENOUS at 08:13

## 2022-07-24 RX ADMIN — PIPERACILLIN AND TAZOBACTAM 25 GRAM(S): 4; .5 INJECTION, POWDER, LYOPHILIZED, FOR SOLUTION INTRAVENOUS at 19:29

## 2022-07-24 RX ADMIN — INSULIN GLARGINE 10 UNIT(S): 100 INJECTION, SOLUTION SUBCUTANEOUS at 23:11

## 2022-07-24 RX ADMIN — Medication 100 MILLIEQUIVALENT(S): at 05:47

## 2022-07-24 RX ADMIN — Medication 50 MILLIGRAM(S): at 05:25

## 2022-07-24 NOTE — SWALLOW BEDSIDE ASSESSMENT ADULT - SWALLOW EVAL: RECOMMENDED DIET
1) Oral nutrition/hydration/medication is contraindicated at this time given aforementioned findings. 2) Consider short term non-oral means of nutrition/hydration.

## 2022-07-24 NOTE — PROGRESS NOTE ADULT - SUBJECTIVE AND OBJECTIVE BOX
CHIEF COMPLAINT: Patient is a 89y old  Male who presents with a chief complaint of Increasing confusion, cold like symptoms, covid exposure (2022 08:19)    Interval Events: No events overnight. Patient intermittently clenching arms and creating false BP measurements. Has been normotensive when not arm clenching    REVIEW OF SYSTEMS:    [X ] Unable to assess ROS because AMS    OBJECTIVE:  ICU Vital Signs Last 24 Hrs  T(C): 36.3 (2022 08:00), Max: 37.3 (2022 16:00)  T(F): 97.3 (2022 08:00), Max: 99.1 (2022 16:00)  HR: 86 (2022 09:00) (70 - 111)  BP: 105/48 (2022 09:00) (78/40 - 262/223)  BP(mean): 65 (2022 09:00) (52 - 234)  ABP: --  ABP(mean): --  RR: 17 (2022 09:00) (15 - 38)  SpO2: 100% (2022 09:00) (78% - 100%)    O2 Parameters below as of 2022 08:00  Patient On (Oxygen Delivery Method): nasal cannula  O2 Flow (L/min): 3             @ :  -   @ 07:00  --------------------------------------------------------  IN: 3909.5 mL / OUT: 2745 mL / NET: 1164.5 mL     @ 07:  -   @ 10:10  --------------------------------------------------------  IN: 200 mL / OUT: 0 mL / NET: 200 mL      CAPILLARY BLOOD GLUCOSE      POCT Blood Glucose.: 267 mg/dL (2022 05:45)      PHYSICAL EXAM:  General: NAD, doing well. not followign commands, very [place]  HEENT: MMM  Lymph Nodes: No TRAV palpated  Neck: trachea midline. no trach.   Respiratory: CTA b/l. No rales, rhonchi, wheezing appreciated.  Cardiovascular: RRR. +s1/s2, -s3/s4. No murmur, rubs, gallops. No edema  Abdomen: NT/ND. +BS, No HSM.   Extremities: 2+ pulses  Skin: edematous. No rashes, ecchymoses, or petechiae noted  Neurological: AAOx0.       HOSPITAL MEDICATIONS:  MEDICATIONS  (STANDING):  ascorbic acid 500 milliGRAM(s) Oral daily  dextrose 5%. 1000 milliLiter(s) (100 mL/Hr) IV Continuous <Continuous>  dextrose 5%. 1000 milliLiter(s) (50 mL/Hr) IV Continuous <Continuous>  dextrose 5%. 1000 milliLiter(s) (100 mL/Hr) IV Continuous <Continuous>  dextrose 5%. 1000 milliLiter(s) (50 mL/Hr) IV Continuous <Continuous>  dextrose 50% Injectable 25 Gram(s) IV Push once  dextrose 50% Injectable 12.5 Gram(s) IV Push once  dextrose 50% Injectable 25 Gram(s) IV Push once  dextrose 50% Injectable 25 Gram(s) IV Push once  dextrose 50% Injectable 12.5 Gram(s) IV Push once  dextrose 50% Injectable 25 Gram(s) IV Push once  donepezil 5 milliGRAM(s) Oral at bedtime  glucagon  Injectable 1 milliGRAM(s) IntraMuscular once  glucagon  Injectable 1 milliGRAM(s) IntraMuscular once  hydrocortisone sodium succinate Injectable 50 milliGRAM(s) IV Push every 6 hours  insulin glargine Injectable (LANTUS) 10 Unit(s) SubCutaneous at bedtime  insulin lispro (ADMELOG) corrective regimen sliding scale   SubCutaneous every 6 hours  melatonin 9 milliGRAM(s) Oral at bedtime  multivitamin 1 Tablet(s) Oral daily  pantoprazole    Tablet 40 milliGRAM(s) Oral before breakfast  piperacillin/tazobactam IVPB.. 3.375 Gram(s) IV Intermittent every 12 hours  QUEtiapine 12.5 milliGRAM(s) Oral at bedtime  simvastatin 20 milliGRAM(s) Oral at bedtime    MEDICATIONS  (PRN):  acetaminophen     Tablet .. 650 milliGRAM(s) Oral every 4 hours PRN Temp greater or equal to 38C (100.4F), Severe Pain (7 - 10)  ALBUTerol    90 MICROgram(s) HFA Inhaler 2 Puff(s) Inhalation every 6 hours PRN Bronchospasm  dextrose Oral Gel 15 Gram(s) Oral once PRN Blood Glucose LESS THAN 70 milliGRAM(s)/deciliter  dextrose Oral Gel 15 Gram(s) Oral once PRN Blood Glucose LESS THAN 70 milliGRAM(s)/deciliter  guaiFENesin Oral Liquid (Sugar-Free) 100 milliGRAM(s) Oral every 6 hours PRN Cough  metoprolol tartrate Injectable 5 milliGRAM(s) IV Push every 6 hours PRN sustained HR over 140 bpm      LABS:  ( @ 04:30)                        7.4  11.01 )-----------( 108                 23.1    Neutrophils = 10.33 (93.8%)  Lymphocytes = 0.34 (3.1%)  Eosinophils = 0.00 (0.0%)  Basophils = 0.01 (0.1%)  Monocytes = 0.14 (1.3%)  Bands = --%    WBC Trend: 11.01<--, 23.21<--, 24.78<--  Hb Trend: 7.4<--, 9.8<--, 9.2<--, 9.2<--, 10.6<--  Plt Trend: 108<--, 170<--, 169<--, 179<--, 212<--      147<H>  |  104  |  67<H>  ----------------------------<  290<H>  3.1<L>   |  35<H>  |  1.72<H>    Ca    7.1<L>      2022 04:30  Phos  2.7     -  Mg     2.00         TPro  4.1<L>  /  Alb  1.6<L>  /  TBili  0.6  /  DBili  x   /  AST  795<H>  /  ALT  779<H>  /  AlkPhos  71      Creatinine Trend: 1.72<--, 2.50<--, 2.79<--, 2.92<--, 1.85<--, 1.16<--    Urinalysis Basic - ( 2022 00:25 )    Color: Yellow / Appearance: Slightly Turbid / S.022 / pH: x  Gluc: x / Ketone: Negative  / Bili: Negative / Urobili: <2 mg/dL   Blood: x / Protein: 30 mg/dL / Nitrite: Negative   Leuk Esterase: Negative / RBC: 124 /HPF / WBC 10 /HPF   Sq Epi: x / Non Sq Epi: 8 /HPF / Bacteria: Occasional      Arterial Blood Gas:   @ 20:23  7.13/39/95/13/97.5/-15.2  ABG lactate: --    Venous Blood Gas:   @ 10:59  7.32/51/34/26/42.5  VBG Lactate: 6.8  Venous Blood Gas:   @ 06:23  7.34/44/21/24/23.7  VBG Lactate: 8.8  Venous Blood Gas:   @ 02:47  7.32/40/45/21/65.7  VBG Lactate: 6.2    CARDIAC MARKERS ( 2022 20:10 )  Trop x     /  U/L / CKMB x             MICROBIOLOGY:   Blood Cx:  Urine Cx:  Sputum Cx:  Legionella:  RVP:    RADIOLOGY:  X Ray:  CT:  MRI:  Ultrasound:  [ ] Reviewed and interpreted by me    EKG:

## 2022-07-24 NOTE — SWALLOW BEDSIDE ASSESSMENT ADULT - ORAL PHASE
Absent posterior transport/Decreased anterior-posterior movement of the bolus
Decreased anterior-posterior movement of the bolus/Delayed oral transit time

## 2022-07-24 NOTE — PROGRESS NOTE ADULT - CRITICAL CARE ATTENDING COMMENT
medical management, review of labs/tests/records, discussed with team, documentation.
medical management, review of labs/tests/records, discussion with patient, team, documentation.

## 2022-07-24 NOTE — PROGRESS NOTE ADULT - ASSESSMENT
89M PMHx dementia (AAO x 2 at baseline), Afib on eliquis, DM, HTN, [place], recently COVID-19+ on 7/10 s/p Dexamethasone and Remdesivir, presenting for AMS with worsening confusion and decreased responsiveness, found to be in septic shock secondary to acute metabolic acidosis vs aspiration PNA    Neuro  #baseline dementia  -now appearing more altered likely iso toxic metabolic encephalopathy  - Agitation 2/2 baseline dementia - haldol PRN when QTc is appropriate    Pulm  #DDx aspiration PNA  -on NC  -CXR clear  -possible aspiration PNA given consolidation over right lung base, will cover broadly with Zosyn    Cardiac  -wean pressor requirements as tolerated  -rcvd stress dose steroids during RRT - previously on dexamethasone for covid pna  -held OAc pending imaging  -held antihypertensives in setting of hypotension  - can add midodrine when taking PO and having improvement in mentation.    #hypertensive urgency  - 7/23 received metoprolol 5 IV for BP of 240/190 corrected to 88/44  - likely false reading i/s/o arm clenching     GI  -NPO given possible aspiration       Renal  #TENZIN, prerenal  #severe metabolic acidosis  -trend CMP    Endo  #DM  -FS q6h and SSI  - has been hyperglycemic since receiving steroid    ID  #aspiration pna  on zosyn    Heme/Onc  -held OAc pending imaging    PPx  -SCD for DVT    GOC  -D/w family, plan to pursue trial of pressor support and abx, forgoing HD/RRT

## 2022-07-25 LAB
ALBUMIN SERPL ELPH-MCNC: 1.9 G/DL — LOW (ref 3.3–5)
ALP SERPL-CCNC: 84 U/L — SIGNIFICANT CHANGE UP (ref 40–120)
ALT FLD-CCNC: 787 U/L — HIGH (ref 4–41)
ANION GAP SERPL CALC-SCNC: 8 MMOL/L — SIGNIFICANT CHANGE UP (ref 7–14)
AST SERPL-CCNC: 513 U/L — HIGH (ref 4–40)
BASE EXCESS BLDV CALC-SCNC: 15.9 MMOL/L — HIGH (ref -2–3)
BASOPHILS # BLD AUTO: 0.02 K/UL — SIGNIFICANT CHANGE UP (ref 0–0.2)
BASOPHILS NFR BLD AUTO: 0.2 % — SIGNIFICANT CHANGE UP (ref 0–2)
BILIRUB SERPL-MCNC: 0.9 MG/DL — SIGNIFICANT CHANGE UP (ref 0.2–1.2)
BLOOD GAS VENOUS COMPREHENSIVE RESULT: SIGNIFICANT CHANGE UP
BUN SERPL-MCNC: 60 MG/DL — HIGH (ref 7–23)
CALCIUM SERPL-MCNC: 7.4 MG/DL — LOW (ref 8.4–10.5)
CHLORIDE BLDV-SCNC: 108 MMOL/L — SIGNIFICANT CHANGE UP (ref 96–108)
CHLORIDE SERPL-SCNC: 106 MMOL/L — SIGNIFICANT CHANGE UP (ref 98–107)
CO2 BLDV-SCNC: 42.3 MMOL/L — HIGH (ref 22–26)
CO2 SERPL-SCNC: 34 MMOL/L — HIGH (ref 22–31)
CREAT SERPL-MCNC: 1.43 MG/DL — HIGH (ref 0.5–1.3)
EGFR: 47 ML/MIN/1.73M2 — LOW
EOSINOPHIL # BLD AUTO: 0 K/UL — SIGNIFICANT CHANGE UP (ref 0–0.5)
EOSINOPHIL NFR BLD AUTO: 0 % — SIGNIFICANT CHANGE UP (ref 0–6)
GAS PNL BLDV: 146 MMOL/L — HIGH (ref 136–145)
GAS PNL BLDV: SIGNIFICANT CHANGE UP
GLUCOSE BLDC GLUCOMTR-MCNC: 202 MG/DL — HIGH (ref 70–99)
GLUCOSE BLDC GLUCOMTR-MCNC: 246 MG/DL — HIGH (ref 70–99)
GLUCOSE BLDC GLUCOMTR-MCNC: 254 MG/DL — HIGH (ref 70–99)
GLUCOSE BLDV-MCNC: 261 MG/DL — HIGH (ref 70–99)
GLUCOSE SERPL-MCNC: 270 MG/DL — HIGH (ref 70–99)
HCO3 BLDV-SCNC: 41 MMOL/L — HIGH (ref 22–29)
HCT VFR BLD CALC: 26.7 % — LOW (ref 39–50)
HCT VFR BLDA CALC: 26 % — LOW (ref 39–51)
HGB BLD CALC-MCNC: 8.5 G/DL — LOW (ref 13–17)
HGB BLD-MCNC: 8.6 G/DL — LOW (ref 13–17)
IANC: 10.43 K/UL — HIGH (ref 1.8–7.4)
IMM GRANULOCYTES NFR BLD AUTO: 1.2 % — SIGNIFICANT CHANGE UP (ref 0–1.5)
LACTATE BLDV-MCNC: 2.5 MMOL/L — HIGH (ref 0.5–2)
LYMPHOCYTES # BLD AUTO: 0.4 K/UL — LOW (ref 1–3.3)
LYMPHOCYTES # BLD AUTO: 3.6 % — LOW (ref 13–44)
MAGNESIUM SERPL-MCNC: 2.1 MG/DL — SIGNIFICANT CHANGE UP (ref 1.6–2.6)
MCHC RBC-ENTMCNC: 32.2 GM/DL — SIGNIFICANT CHANGE UP (ref 32–36)
MCHC RBC-ENTMCNC: 33 PG — SIGNIFICANT CHANGE UP (ref 27–34)
MCV RBC AUTO: 102.3 FL — HIGH (ref 80–100)
MONOCYTES # BLD AUTO: 0.12 K/UL — SIGNIFICANT CHANGE UP (ref 0–0.9)
MONOCYTES NFR BLD AUTO: 1.1 % — LOW (ref 2–14)
NEUTROPHILS # BLD AUTO: 10.43 K/UL — HIGH (ref 1.8–7.4)
NEUTROPHILS NFR BLD AUTO: 93.9 % — HIGH (ref 43–77)
NRBC # BLD: 0 /100 WBCS — SIGNIFICANT CHANGE UP
NRBC # FLD: 0 K/UL — SIGNIFICANT CHANGE UP
PCO2 BLDV: 51 MMHG — SIGNIFICANT CHANGE UP (ref 42–55)
PH BLDV: 7.51 — HIGH (ref 7.32–7.43)
PHOSPHATE SERPL-MCNC: 3.4 MG/DL — SIGNIFICANT CHANGE UP (ref 2.5–4.5)
PLATELET # BLD AUTO: 102 K/UL — LOW (ref 150–400)
PO2 BLDV: 43 MMHG — SIGNIFICANT CHANGE UP
POTASSIUM BLDV-SCNC: 4.6 MMOL/L — SIGNIFICANT CHANGE UP (ref 3.5–5.1)
POTASSIUM SERPL-MCNC: 4.7 MMOL/L — SIGNIFICANT CHANGE UP (ref 3.5–5.3)
POTASSIUM SERPL-SCNC: 4.7 MMOL/L — SIGNIFICANT CHANGE UP (ref 3.5–5.3)
PROT SERPL-MCNC: 5.2 G/DL — LOW (ref 6–8.3)
RBC # BLD: 2.61 M/UL — LOW (ref 4.2–5.8)
RBC # FLD: 15.3 % — HIGH (ref 10.3–14.5)
SAO2 % BLDV: 72.6 % — SIGNIFICANT CHANGE UP
SODIUM SERPL-SCNC: 148 MMOL/L — HIGH (ref 135–145)
VANCOMYCIN FLD-MCNC: <4 UG/ML — SIGNIFICANT CHANGE UP
WBC # BLD: 11.1 K/UL — HIGH (ref 3.8–10.5)
WBC # FLD AUTO: 11.1 K/UL — HIGH (ref 3.8–10.5)

## 2022-07-25 PROCEDURE — 99232 SBSQ HOSP IP/OBS MODERATE 35: CPT | Mod: GC

## 2022-07-25 PROCEDURE — 76604 US EXAM CHEST: CPT | Mod: 26,GC

## 2022-07-25 RX ADMIN — PIPERACILLIN AND TAZOBACTAM 25 GRAM(S): 4; .5 INJECTION, POWDER, LYOPHILIZED, FOR SOLUTION INTRAVENOUS at 07:39

## 2022-07-25 RX ADMIN — Medication 50 MILLIGRAM(S): at 05:51

## 2022-07-25 RX ADMIN — Medication 50 MILLIGRAM(S): at 11:44

## 2022-07-25 RX ADMIN — Medication 4: at 17:33

## 2022-07-25 RX ADMIN — PIPERACILLIN AND TAZOBACTAM 25 GRAM(S): 4; .5 INJECTION, POWDER, LYOPHILIZED, FOR SOLUTION INTRAVENOUS at 19:49

## 2022-07-25 RX ADMIN — Medication 50 MILLIGRAM(S): at 17:29

## 2022-07-25 RX ADMIN — Medication 4: at 05:51

## 2022-07-25 RX ADMIN — Medication 6: at 13:24

## 2022-07-25 NOTE — PROGRESS NOTE ADULT - ATTENDING COMMENTS
1. Septic shock from aspiration pneumonia. Shock has resolved. Continue  Zosyn.  2. Covid . Continue dexamethasone.  3. Dementia: stable. Haldol prn. Check QTC  before giving.
pt with COVID s/p treatment, admitted to MICU for aspiration pna and septic shock. Now off pressor support. Check swallow evaluation, TENZIN, Creatinine improving. continue zosyn for total 5-7 days.
89M PMHx dementia (AAO x 2 at baseline), Afib on eliquis, DM, HTN, [place], recently COVID-19+ on 7/10 s/p Dexamethasone and Remdesivir, presenting for AMS with worsening confusion and decreased responsiveness, found to be in septic shock secondary to acute metabolic acidosis vs aspiration PNA. admitted for vasopressor support, now off. lactate improving. off. continue empiric antibiotics. trend transaminitis due to hypotension.

## 2022-07-25 NOTE — CHART NOTE - NSCHARTNOTEFT_GEN_A_CORE
: Juana Gilbert MD, Ayaz Pedro MD, Arthur Copeland MD      INDICATION:    PROCEDURE:  [ ] LIMITED ECHO  [x] LIMITED CHEST  [ ] LIMITED RETROPERITONEAL  [ ] LIMITED ABDOMINAL  [ ] LIMITED DVT  [ ] NEEDLE GUIDANCE VASCULAR  [ ] NEEDLE GUIDANCE THORACENTESIS  [ ] NEEDLE GUIDANCE PARACENTESIS  [ ] NEEDLE GUIDANCE PERICARDIOCENTESIS  [ ] OTHER    FINDINGS:    Trace pleural effusion at left lung base with LLL consolidation     B line predominant pattern at the bases     Grossly normal LVSF       INTERPRETATION:    LLL consolidation concerning for pneumonia         Images uploaded on [employer] : Juana Gilbert MD, Ayaz Pedro MD, Arthur Copeland MD      INDICATION: Aspiration pna and septic shock.    PROCEDURE:  [ ] LIMITED ECHO  [x] LIMITED CHEST  [ ] LIMITED RETROPERITONEAL  [ ] LIMITED ABDOMINAL  [ ] LIMITED DVT  [ ] NEEDLE GUIDANCE VASCULAR  [ ] NEEDLE GUIDANCE THORACENTESIS  [ ] NEEDLE GUIDANCE PARACENTESIS  [ ] NEEDLE GUIDANCE PERICARDIOCENTESIS  [ ] OTHER    FINDINGS:    Trace pleural effusion at left lung base with LLL consolidation     B line predominant pattern at the bases     Grossly normal LVSF       INTERPRETATION:    LLL consolidation concerning for pneumonia         Images uploaded on [employer] [facility] : Juana Gilbert MD, Ayaz Pedro MD, Arthur Copeland MD      INDICATION: Aspiration pna and septic shock.    PROCEDURE:  [ ] LIMITED ECHO  [x] LIMITED CHEST  [ ] LIMITED RETROPERITONEAL  [ ] LIMITED ABDOMINAL  [ ] LIMITED DVT  [ ] NEEDLE GUIDANCE VASCULAR  [ ] NEEDLE GUIDANCE THORACENTESIS  [ ] NEEDLE GUIDANCE PARACENTESIS  [ ] NEEDLE GUIDANCE PERICARDIOCENTESIS  [ ] OTHER    FINDINGS:    Trace pleural effusion at left lung base with LLL consolidation     B line predominant pattern at the bases      normal LVSF       INTERPRETATION:    LLL consolidation concerning for pneumonia.        Images uploaded on [employer]

## 2022-07-25 NOTE — CHART NOTE - NSCHARTNOTEFT_GEN_A_CORE
Please refer to MICU progress notes for more detail:     89M PMHx dementia (AAO x 2 at baseline), Afib on eliquis, DM, HTN, [place], recently COVID-19+ on 7/10 s/p Dexamethasone and Remdesivir, presenting for AMS with worsening confusion and decreased responsiveness, found to be in septic shock secondary to acute metabolic acidosis vs aspiration PNA    For follow-up:  [] GOC/Palliative consult. Patient failed S&S  [] duration of IV antibiotics

## 2022-07-25 NOTE — PROGRESS NOTE ADULT - ASSESSMENT
89M PMHx dementia (AAO x 2 at baseline), Afib on eliquis, DM, HTN, [place], recently COVID-19+ on 7/10 s/p Dexamethasone and Remdesivir, presenting for AMS with worsening confusion and decreased responsiveness, found to be in septic shock secondary to acute metabolic acidosis vs aspiration PNA    Neuro  #baseline dementia  -now appearing more altered likely iso toxic metabolic encephalopathy  - Agitation 2/2 baseline dementia - haldol PRN when QTc is appropriate    Pulm  #DDx aspiration PNA  -on NC  -CXR clear  -possible aspiration PNA given consolidation over right lung base, will cover broadly with Zosyn    Cardiac  -wean pressor requirements as tolerated  -rcvd stress dose steroids during RRT - previously on dexamethasone for covid pna  -held OAc pending imaging  -held antihypertensives in setting of hypotension  - can add midodrine when taking PO and having improvement in mentation.    #hypertensive urgency  - 7/23 received metoprolol 5 IV for BP of 240/190 corrected to 88/44  - likely false reading i/s/o arm clenching     GI  -NPO given possible aspiration       Renal  #TENZIN, prerenal  #severe metabolic acidosis  -trend CMP    Endo  #DM  -FS q6h and SSI  - has been hyperglycemic since receiving steroid    ID  #aspiration pna  on zosyn    Heme/Onc  -held OAc pending imaging    PPx  -SCD for DVT    GOC  -D/w family, plan to pursue trial of pressor support and abx, forgoing HD/RRT     89M PMHx dementia (AAO x 2 at baseline), Afib on eliquis, DM, HTN, [place], recently COVID-19+ on 7/10 s/p Dexamethasone and Remdesivir, presenting for AMS with worsening confusion and decreased responsiveness, found to be in septic shock secondary to acute metabolic acidosis vs aspiration PNA    Neuro  #baseline dementia  - Agitation 2/2 baseline dementia - haldol PRN when QTc is appropriate    Pulm  #DDx aspiration PNA  -on NC  -CXR clear  -possible aspiration PNA given consolidation over right lung base, will cover broadly with Zosyn    Cardiac  - No longer requiring pressors      GI  - NPO given possible aspiration   - Discussed NG tube vs. pleasure feeds as patient failed speech/swallow eval with son-in-law, will hold off at this time    Renal  #TENZIN, prerenal  #severe metabolic acidosis  -trend CMP    Endo  #DM  -FS q6h and SSI  - has been hyperglycemic since receiving steroid    ID  #aspiration pna  on zosyn    Heme/Onc  -held OAc pending imaging    PPx  -SCD for DVT    GOC  -D/w family, plan to pursue trial of pressor support and abx, forgoing HD/RRT

## 2022-07-25 NOTE — PROGRESS NOTE ADULT - SUBJECTIVE AND OBJECTIVE BOX
*******************************  Maddie Hinojosa PGY-1  *******************************    INTERVAL HPI/OVERNIGHT EVENTS:    SUBJECTIVE: Patient seen and examined at bedside.     CONSTITUTIONAL: No weakness, fevers or chills  EYES/ENT: No visual changes;  No vertigo or throat pain   NECK: No pain or stiffness  RESPIRATORY: No cough, wheezing, hemoptysis; No shortness of breath  CARDIOVASCULAR: No chest pain or palpitations  GASTROINTESTINAL: No abdominal or epigastric pain. No nausea, vomiting, or hematemesis; No diarrhea or constipation. No melena or hematochezia.  GENITOURINARY: No dysuria, frequency or hematuria  NEUROLOGICAL: No numbness or weakness  SKIN: No itching, rashes    OBJECTIVE:    VITAL SIGNS:  ICU Vital Signs Last 24 Hrs  T(C): 36.1 (25 Jul 2022 08:00), Max: 36.3 (24 Jul 2022 16:00)  T(F): 96.9 (25 Jul 2022 08:00), Max: 97.4 (24 Jul 2022 16:00)  HR: 100 (25 Jul 2022 11:00) (79 - 106)  BP: 145/119 (25 Jul 2022 11:00) (107/81 - 145/119)  BP(mean): 123 (25 Jul 2022 11:00) (56 - 123)  ABP: --  ABP(mean): --  RR: 38 (25 Jul 2022 11:00) (15 - 38)  SpO2: 99% (25 Jul 2022 11:00) (93% - 100%)    O2 Parameters below as of 25 Jul 2022 11:00  Patient On (Oxygen Delivery Method): nasal cannula  O2 Flow (L/min): 2            07-24 @ 07:01  -  07-25 @ 07:00  --------------------------------------------------------  IN: 300 mL / OUT: 1615 mL / NET: -1315 mL    07-25 @ 07:01 - 07-25 @ 13:41  --------------------------------------------------------  IN: 100 mL / OUT: 60 mL / NET: 40 mL      CAPILLARY BLOOD GLUCOSE      POCT Blood Glucose.: 254 mg/dL (25 Jul 2022 12:56)        PHYSICAL EXAM:  General: NAD, doing well. not followign commands, very [place]  HEENT: MMM  Lymph Nodes: No TRAV palpated  Neck: trachea midline. no trach.   Respiratory: CTA b/l. No rales, rhonchi, wheezing appreciated.  Cardiovascular: RRR. +s1/s2, -s3/s4. No murmur, rubs, gallops. No edema  Abdomen: NT/ND. +BS, No HSM.   Extremities: 2+ pulses  Skin: edematous. No rashes, ecchymoses, or petechiae noted  Neurological: AAOx0.       MEDICATIONS:  MEDICATIONS  (STANDING):  ascorbic acid 500 milliGRAM(s) Oral daily  dextrose 5%. 1000 milliLiter(s) (100 mL/Hr) IV Continuous <Continuous>  dextrose 5%. 1000 milliLiter(s) (50 mL/Hr) IV Continuous <Continuous>  dextrose 5%. 1000 milliLiter(s) (50 mL/Hr) IV Continuous <Continuous>  dextrose 5%. 1000 milliLiter(s) (100 mL/Hr) IV Continuous <Continuous>  dextrose 50% Injectable 25 Gram(s) IV Push once  dextrose 50% Injectable 12.5 Gram(s) IV Push once  dextrose 50% Injectable 25 Gram(s) IV Push once  dextrose 50% Injectable 25 Gram(s) IV Push once  dextrose 50% Injectable 12.5 Gram(s) IV Push once  dextrose 50% Injectable 25 Gram(s) IV Push once  donepezil 5 milliGRAM(s) Oral at bedtime  glucagon  Injectable 1 milliGRAM(s) IntraMuscular once  glucagon  Injectable 1 milliGRAM(s) IntraMuscular once  hydrocortisone sodium succinate Injectable 50 milliGRAM(s) IV Push every 6 hours  insulin glargine Injectable (LANTUS) 10 Unit(s) SubCutaneous at bedtime  insulin lispro (ADMELOG) corrective regimen sliding scale   SubCutaneous every 6 hours  melatonin 9 milliGRAM(s) Oral at bedtime  multivitamin 1 Tablet(s) Oral daily  pantoprazole    Tablet 40 milliGRAM(s) Oral before breakfast  piperacillin/tazobactam IVPB.. 3.375 Gram(s) IV Intermittent every 12 hours  QUEtiapine 12.5 milliGRAM(s) Oral at bedtime  simvastatin 20 milliGRAM(s) Oral at bedtime    MEDICATIONS  (PRN):  acetaminophen     Tablet .. 650 milliGRAM(s) Oral every 4 hours PRN Temp greater or equal to 38C (100.4F), Severe Pain (7 - 10)  ALBUTerol    90 MICROgram(s) HFA Inhaler 2 Puff(s) Inhalation every 6 hours PRN Bronchospasm  dextrose Oral Gel 15 Gram(s) Oral once PRN Blood Glucose LESS THAN 70 milliGRAM(s)/deciliter  dextrose Oral Gel 15 Gram(s) Oral once PRN Blood Glucose LESS THAN 70 milliGRAM(s)/deciliter  guaiFENesin Oral Liquid (Sugar-Free) 100 milliGRAM(s) Oral every 6 hours PRN Cough  metoprolol tartrate Injectable 5 milliGRAM(s) IV Push every 6 hours PRN sustained HR over 140 bpm      ALLERGIES:  Allergies    No Known Allergies    Intolerances        LABS:                        8.6    11.10 )-----------( 102      ( 25 Jul 2022 02:50 )             26.7     07-25    148<H>  |  106  |  60<H>  ----------------------------<  270<H>  4.7   |  34<H>  |  1.43<H>    Ca    7.4<L>      25 Jul 2022 02:50  Phos  3.4     07-25  Mg     2.10     07-25    TPro  5.2<L>  /  Alb  1.9<L>  /  TBili  0.9  /  DBili  x   /  AST  513<H>  /  ALT  787<H>  /  AlkPhos  84  07-25          RADIOLOGY & ADDITIONAL TESTS: Reviewed.     *******************************  Maddie Hinojosa PGY-1  *******************************    INTERVAL HPI/OVERNIGHT EVENTS: No acute events overnight.    SUBJECTIVE: Patient seen and examined at bedside. Attempted to interview patient through  #[id number], however unable to understand patient.     Unable to obtain ROS as patient not speaking identifiable words     OBJECTIVE:    VITAL SIGNS:  ICU Vital Signs Last 24 Hrs  T(C): 36.1 (25 Jul 2022 08:00), Max: 36.3 (24 Jul 2022 16:00)  T(F): 96.9 (25 Jul 2022 08:00), Max: 97.4 (24 Jul 2022 16:00)  HR: 100 (25 Jul 2022 11:00) (79 - 106)  BP: 145/119 (25 Jul 2022 11:00) (107/81 - 145/119)  BP(mean): 123 (25 Jul 2022 11:00) (56 - 123)  ABP: --  ABP(mean): --  RR: 38 (25 Jul 2022 11:00) (15 - 38)  SpO2: 99% (25 Jul 2022 11:00) (93% - 100%)    O2 Parameters below as of 25 Jul 2022 11:00  Patient On (Oxygen Delivery Method): nasal cannula  O2 Flow (L/min): 2            07-24 @ 07:01 - 07-25 @ 07:00  --------------------------------------------------------  IN: 300 mL / OUT: 1615 mL / NET: -1315 mL    07-25 @ 07:01 - 07-25 @ 13:41  --------------------------------------------------------  IN: 100 mL / OUT: 60 mL / NET: 40 mL      CAPILLARY BLOOD GLUCOSE      POCT Blood Glucose.: 254 mg/dL (25 Jul 2022 12:56)        PHYSICAL EXAM:  General: NAD, cachectic. not following commands, very [place]  HEENT: MMM, poor dentition  Neck: trachea midline   Respiratory: CTA b/l. No rales, rhonchi, wheezing appreciated.  Cardiovascular: RRR. +s1/s2, -s3/s4. No murmur, rubs, gallops. No edema  Abdomen: NT/ND. +BS, No HSM.   Extremities: 2+ pulses  Skin: edematous. No rashes, ecchymoses, or petechiae noted  Neurological: Patient not speaking coherently      MEDICATIONS:  MEDICATIONS  (STANDING):  ascorbic acid 500 milliGRAM(s) Oral daily  dextrose 5%. 1000 milliLiter(s) (100 mL/Hr) IV Continuous <Continuous>  dextrose 5%. 1000 milliLiter(s) (50 mL/Hr) IV Continuous <Continuous>  dextrose 5%. 1000 milliLiter(s) (50 mL/Hr) IV Continuous <Continuous>  dextrose 5%. 1000 milliLiter(s) (100 mL/Hr) IV Continuous <Continuous>  dextrose 50% Injectable 25 Gram(s) IV Push once  dextrose 50% Injectable 12.5 Gram(s) IV Push once  dextrose 50% Injectable 25 Gram(s) IV Push once  dextrose 50% Injectable 25 Gram(s) IV Push once  dextrose 50% Injectable 12.5 Gram(s) IV Push once  dextrose 50% Injectable 25 Gram(s) IV Push once  donepezil 5 milliGRAM(s) Oral at bedtime  glucagon  Injectable 1 milliGRAM(s) IntraMuscular once  glucagon  Injectable 1 milliGRAM(s) IntraMuscular once  hydrocortisone sodium succinate Injectable 50 milliGRAM(s) IV Push every 6 hours  insulin glargine Injectable (LANTUS) 10 Unit(s) SubCutaneous at bedtime  insulin lispro (ADMELOG) corrective regimen sliding scale   SubCutaneous every 6 hours  melatonin 9 milliGRAM(s) Oral at bedtime  multivitamin 1 Tablet(s) Oral daily  pantoprazole    Tablet 40 milliGRAM(s) Oral before breakfast  piperacillin/tazobactam IVPB.. 3.375 Gram(s) IV Intermittent every 12 hours  QUEtiapine 12.5 milliGRAM(s) Oral at bedtime  simvastatin 20 milliGRAM(s) Oral at bedtime    MEDICATIONS  (PRN):  acetaminophen     Tablet .. 650 milliGRAM(s) Oral every 4 hours PRN Temp greater or equal to 38C (100.4F), Severe Pain (7 - 10)  ALBUTerol    90 MICROgram(s) HFA Inhaler 2 Puff(s) Inhalation every 6 hours PRN Bronchospasm  dextrose Oral Gel 15 Gram(s) Oral once PRN Blood Glucose LESS THAN 70 milliGRAM(s)/deciliter  dextrose Oral Gel 15 Gram(s) Oral once PRN Blood Glucose LESS THAN 70 milliGRAM(s)/deciliter  guaiFENesin Oral Liquid (Sugar-Free) 100 milliGRAM(s) Oral every 6 hours PRN Cough  metoprolol tartrate Injectable 5 milliGRAM(s) IV Push every 6 hours PRN sustained HR over 140 bpm      ALLERGIES:  Allergies    No Known Allergies    Intolerances        LABS:                        8.6    11.10 )-----------( 102      ( 25 Jul 2022 02:50 )             26.7     07-25    148<H>  |  106  |  60<H>  ----------------------------<  270<H>  4.7   |  34<H>  |  1.43<H>    Ca    7.4<L>      25 Jul 2022 02:50  Phos  3.4     07-25  Mg     2.10     07-25    TPro  5.2<L>  /  Alb  1.9<L>  /  TBili  0.9  /  DBili  x   /  AST  513<H>  /  ALT  787<H>  /  AlkPhos  84  07-25          RADIOLOGY & ADDITIONAL TESTS: Reviewed.

## 2022-07-26 DIAGNOSIS — R13.10 DYSPHAGIA, UNSPECIFIED: ICD-10-CM

## 2022-07-26 DIAGNOSIS — J69.0 PNEUMONITIS DUE TO INHALATION OF FOOD AND VOMIT: ICD-10-CM

## 2022-07-26 DIAGNOSIS — Z51.5 ENCOUNTER FOR PALLIATIVE CARE: ICD-10-CM

## 2022-07-26 DIAGNOSIS — Z71.89 OTHER SPECIFIED COUNSELING: ICD-10-CM

## 2022-07-26 DIAGNOSIS — R53.81 OTHER MALAISE: ICD-10-CM

## 2022-07-26 LAB
ANION GAP SERPL CALC-SCNC: 9 MMOL/L — SIGNIFICANT CHANGE UP (ref 7–14)
BASOPHILS # BLD AUTO: 0.01 K/UL — SIGNIFICANT CHANGE UP (ref 0–0.2)
BASOPHILS NFR BLD AUTO: 0.1 % — SIGNIFICANT CHANGE UP (ref 0–2)
BUN SERPL-MCNC: 57 MG/DL — HIGH (ref 7–23)
CALCIUM SERPL-MCNC: 8 MG/DL — LOW (ref 8.4–10.5)
CHLORIDE SERPL-SCNC: 109 MMOL/L — HIGH (ref 98–107)
CO2 SERPL-SCNC: 35 MMOL/L — HIGH (ref 22–31)
CREAT SERPL-MCNC: 1.27 MG/DL — SIGNIFICANT CHANGE UP (ref 0.5–1.3)
EGFR: 54 ML/MIN/1.73M2 — LOW
EOSINOPHIL # BLD AUTO: 0 K/UL — SIGNIFICANT CHANGE UP (ref 0–0.5)
EOSINOPHIL NFR BLD AUTO: 0 % — SIGNIFICANT CHANGE UP (ref 0–6)
GLUCOSE BLDC GLUCOMTR-MCNC: 175 MG/DL — HIGH (ref 70–99)
GLUCOSE BLDC GLUCOMTR-MCNC: 195 MG/DL — HIGH (ref 70–99)
GLUCOSE SERPL-MCNC: 166 MG/DL — HIGH (ref 70–99)
HCT VFR BLD CALC: 33.8 % — LOW (ref 39–50)
HGB BLD-MCNC: 10.4 G/DL — LOW (ref 13–17)
IANC: 9.54 K/UL — HIGH (ref 1.8–7.4)
IMM GRANULOCYTES NFR BLD AUTO: 1.4 % — SIGNIFICANT CHANGE UP (ref 0–1.5)
LYMPHOCYTES # BLD AUTO: 0.33 K/UL — LOW (ref 1–3.3)
LYMPHOCYTES # BLD AUTO: 3.2 % — LOW (ref 13–44)
MAGNESIUM SERPL-MCNC: 2.2 MG/DL — SIGNIFICANT CHANGE UP (ref 1.6–2.6)
MCHC RBC-ENTMCNC: 30.8 GM/DL — LOW (ref 32–36)
MCHC RBC-ENTMCNC: 32.3 PG — SIGNIFICANT CHANGE UP (ref 27–34)
MCV RBC AUTO: 105 FL — HIGH (ref 80–100)
MONOCYTES # BLD AUTO: 0.21 K/UL — SIGNIFICANT CHANGE UP (ref 0–0.9)
MONOCYTES NFR BLD AUTO: 2.1 % — SIGNIFICANT CHANGE UP (ref 2–14)
NEUTROPHILS # BLD AUTO: 9.54 K/UL — HIGH (ref 1.8–7.4)
NEUTROPHILS NFR BLD AUTO: 93.2 % — HIGH (ref 43–77)
NRBC # BLD: 0 /100 WBCS — SIGNIFICANT CHANGE UP
NRBC # FLD: 0 K/UL — SIGNIFICANT CHANGE UP
PHOSPHATE SERPL-MCNC: 3.3 MG/DL — SIGNIFICANT CHANGE UP (ref 2.5–4.5)
PLATELET # BLD AUTO: 119 K/UL — LOW (ref 150–400)
POTASSIUM SERPL-MCNC: 3.2 MMOL/L — LOW (ref 3.5–5.3)
POTASSIUM SERPL-SCNC: 3.2 MMOL/L — LOW (ref 3.5–5.3)
RBC # BLD: 3.22 M/UL — LOW (ref 4.2–5.8)
RBC # FLD: 14.9 % — HIGH (ref 10.3–14.5)
SODIUM SERPL-SCNC: 153 MMOL/L — HIGH (ref 135–145)
WBC # BLD: 10.23 K/UL — SIGNIFICANT CHANGE UP (ref 3.8–10.5)
WBC # FLD AUTO: 10.23 K/UL — SIGNIFICANT CHANGE UP (ref 3.8–10.5)

## 2022-07-26 PROCEDURE — 99233 SBSQ HOSP IP/OBS HIGH 50: CPT

## 2022-07-26 PROCEDURE — 99498 ADVNCD CARE PLAN ADDL 30 MIN: CPT

## 2022-07-26 PROCEDURE — 99497 ADVNCD CARE PLAN 30 MIN: CPT | Mod: 25

## 2022-07-26 PROCEDURE — 99223 1ST HOSP IP/OBS HIGH 75: CPT

## 2022-07-26 RX ORDER — POTASSIUM CHLORIDE 20 MEQ
10 PACKET (EA) ORAL
Refills: 0 | Status: COMPLETED | OUTPATIENT
Start: 2022-07-26 | End: 2022-07-26

## 2022-07-26 RX ORDER — SODIUM CHLORIDE 9 MG/ML
1000 INJECTION, SOLUTION INTRAVENOUS
Refills: 0 | Status: DISCONTINUED | OUTPATIENT
Start: 2022-07-26 | End: 2022-07-29

## 2022-07-26 RX ORDER — HEPARIN SODIUM 5000 [USP'U]/ML
5000 INJECTION INTRAVENOUS; SUBCUTANEOUS EVERY 12 HOURS
Refills: 0 | Status: DISCONTINUED | OUTPATIENT
Start: 2022-07-26 | End: 2022-08-08

## 2022-07-26 RX ORDER — THIAMINE MONONITRATE (VIT B1) 100 MG
500 TABLET ORAL DAILY
Refills: 0 | Status: COMPLETED | OUTPATIENT
Start: 2022-07-26 | End: 2022-07-30

## 2022-07-26 RX ORDER — LANOLIN ALCOHOL/MO/W.PET/CERES
3 CREAM (GRAM) TOPICAL AT BEDTIME
Refills: 0 | Status: DISCONTINUED | OUTPATIENT
Start: 2022-07-26 | End: 2022-08-08

## 2022-07-26 RX ADMIN — HEPARIN SODIUM 5000 UNIT(S): 5000 INJECTION INTRAVENOUS; SUBCUTANEOUS at 18:44

## 2022-07-26 RX ADMIN — PIPERACILLIN AND TAZOBACTAM 25 GRAM(S): 4; .5 INJECTION, POWDER, LYOPHILIZED, FOR SOLUTION INTRAVENOUS at 06:31

## 2022-07-26 RX ADMIN — Medication 4: at 12:21

## 2022-07-26 RX ADMIN — SIMVASTATIN 20 MILLIGRAM(S): 20 TABLET, FILM COATED ORAL at 22:50

## 2022-07-26 RX ADMIN — Medication 3 MILLIGRAM(S): at 22:50

## 2022-07-26 RX ADMIN — Medication 2: at 08:24

## 2022-07-26 RX ADMIN — Medication 50 MILLIGRAM(S): at 06:32

## 2022-07-26 RX ADMIN — INSULIN GLARGINE 10 UNIT(S): 100 INJECTION, SOLUTION SUBCUTANEOUS at 23:00

## 2022-07-26 RX ADMIN — Medication 100 MILLIEQUIVALENT(S): at 11:32

## 2022-07-26 RX ADMIN — Medication 105 MILLIGRAM(S): at 14:06

## 2022-07-26 RX ADMIN — SODIUM CHLORIDE 75 MILLILITER(S): 9 INJECTION, SOLUTION INTRAVENOUS at 09:56

## 2022-07-26 RX ADMIN — Medication 50 MILLIGRAM(S): at 01:07

## 2022-07-26 RX ADMIN — Medication 100 MILLIEQUIVALENT(S): at 13:09

## 2022-07-26 RX ADMIN — Medication 6: at 18:41

## 2022-07-26 RX ADMIN — PIPERACILLIN AND TAZOBACTAM 25 GRAM(S): 4; .5 INJECTION, POWDER, LYOPHILIZED, FOR SOLUTION INTRAVENOUS at 19:44

## 2022-07-26 RX ADMIN — QUETIAPINE FUMARATE 12.5 MILLIGRAM(S): 200 TABLET, FILM COATED ORAL at 22:50

## 2022-07-26 RX ADMIN — Medication 100 MILLIEQUIVALENT(S): at 09:55

## 2022-07-26 RX ADMIN — Medication 2: at 01:06

## 2022-07-26 NOTE — CONSULT NOTE ADULT - PROBLEM SELECTOR RECOMMENDATION 3
Patient with declining mental status. Per discussion with daughter, she has not noted any improvement in his mental since admission to hospital. Patient does have periods of improved mentation but overall family understands that dementia is a progressive disease.   Supportive care

## 2022-07-26 NOTE — SWALLOW BEDSIDE ASSESSMENT ADULT - SWALLOW EVAL: CURRENT DIET
puree with mildly thick liquids
NPO pending formal swallow evaluation per MD order
NPO as per MD order

## 2022-07-26 NOTE — PROGRESS NOTE ADULT - PROBLEM SELECTOR PLAN 5
Hemodynamically stable, off pressors  monitor bp Hemodynamically stable, off pressors  d/c stress steroid  monitor bp

## 2022-07-26 NOTE — SWALLOW BEDSIDE ASSESSMENT ADULT - ASR SWALLOW RECOMMEND DIAG
Objective testing is NOT indicated at this time as patient present with overt s/sx of impaired airway protection for thin liquids only.
Objective testing NOT warranted given patient with refusal behaviors
objective testing is NOT recommended given clear chest imaging/reluctance to participate in evaluation.
Objective testing is NOT warranted given severity of oral stage deficits

## 2022-07-26 NOTE — SWALLOW BEDSIDE ASSESSMENT ADULT - ASR SWALLOW REFERRAL
MD to consider RD consult given patient is at an increased nutritional risk./Registered Dietitian
MD to consider RD consult given patient is at an increased nutritional risk/Registered Dietitian

## 2022-07-26 NOTE — SWALLOW BEDSIDE ASSESSMENT ADULT - SPECIFY REASON(S)
to assess swallow mechanism
to re-assess swallow mechanism
to re-assess swallow function
to assess swallow function

## 2022-07-26 NOTE — CONSULT NOTE ADULT - PROBLEM SELECTOR RECOMMENDATION 2
PPSV 30%   Performance status declining and family recognize this.   Please assist with ADLs and reposition patient routinely to avoid skin injuries.

## 2022-07-26 NOTE — PROGRESS NOTE ADULT - SUBJECTIVE AND OBJECTIVE BOX
Dr. Arlette Kwok  Pager [phone]    PROGRESS NOTE:     Patient is a 89y old  Male who presents with a chief complaint of Increasing confusion, cold like symptoms, covid exposure (25 Jul 2022 13:41)      SUBJECTIVE / OVERNIGHT EVENTS: pt is demented and confused, unable to verbalize any complaint  ADDITIONAL REVIEW OF SYSTEMS: afebrile     MEDICATIONS  (STANDING):  ascorbic acid 500 milliGRAM(s) Oral daily  dextrose 5%. 1000 milliLiter(s) (100 mL/Hr) IV Continuous <Continuous>  dextrose 5%. 1000 milliLiter(s) (50 mL/Hr) IV Continuous <Continuous>  dextrose 5%. 1000 milliLiter(s) (75 mL/Hr) IV Continuous <Continuous>  dextrose 5%. 1000 milliLiter(s) (100 mL/Hr) IV Continuous <Continuous>  dextrose 5%. 1000 milliLiter(s) (50 mL/Hr) IV Continuous <Continuous>  dextrose 50% Injectable 25 Gram(s) IV Push once  dextrose 50% Injectable 12.5 Gram(s) IV Push once  dextrose 50% Injectable 25 Gram(s) IV Push once  dextrose 50% Injectable 25 Gram(s) IV Push once  dextrose 50% Injectable 12.5 Gram(s) IV Push once  dextrose 50% Injectable 25 Gram(s) IV Push once  donepezil 5 milliGRAM(s) Oral at bedtime  glucagon  Injectable 1 milliGRAM(s) IntraMuscular once  glucagon  Injectable 1 milliGRAM(s) IntraMuscular once  hydrocortisone sodium succinate Injectable 50 milliGRAM(s) IV Push every 6 hours  insulin glargine Injectable (LANTUS) 10 Unit(s) SubCutaneous at bedtime  insulin lispro (ADMELOG) corrective regimen sliding scale   SubCutaneous every 6 hours  melatonin 9 milliGRAM(s) Oral at bedtime  multivitamin 1 Tablet(s) Oral daily  pantoprazole    Tablet 40 milliGRAM(s) Oral before breakfast  piperacillin/tazobactam IVPB.. 3.375 Gram(s) IV Intermittent every 12 hours  potassium chloride  10 mEq/100 mL IVPB 10 milliEquivalent(s) IV Intermittent every 1 hour  QUEtiapine 12.5 milliGRAM(s) Oral at bedtime  simvastatin 20 milliGRAM(s) Oral at bedtime    MEDICATIONS  (PRN):  acetaminophen     Tablet .. 650 milliGRAM(s) Oral every 4 hours PRN Temp greater or equal to 38C (100.4F), Severe Pain (7 - 10)  ALBUTerol    90 MICROgram(s) HFA Inhaler 2 Puff(s) Inhalation every 6 hours PRN Bronchospasm  dextrose Oral Gel 15 Gram(s) Oral once PRN Blood Glucose LESS THAN 70 milliGRAM(s)/deciliter  dextrose Oral Gel 15 Gram(s) Oral once PRN Blood Glucose LESS THAN 70 milliGRAM(s)/deciliter  guaiFENesin Oral Liquid (Sugar-Free) 100 milliGRAM(s) Oral every 6 hours PRN Cough  metoprolol tartrate Injectable 5 milliGRAM(s) IV Push every 6 hours PRN sustained HR over 140 bpm      CAPILLARY BLOOD GLUCOSE      POCT Blood Glucose.: 206 mg/dL (26 Jul 2022 11:48)  POCT Blood Glucose.: 175 mg/dL (26 Jul 2022 07:31)  POCT Blood Glucose.: 195 mg/dL (26 Jul 2022 00:32)  POCT Blood Glucose.: 202 mg/dL (25 Jul 2022 17:31)  POCT Blood Glucose.: 254 mg/dL (25 Jul 2022 12:56)    I&O's Summary    25 Jul 2022 07:01  -  26 Jul 2022 07:00  --------------------------------------------------------  IN: 200 mL / OUT: 370 mL / NET: -170 mL        PHYSICAL EXAM:  Vital Signs Last 24 Hrs  T(C): 36.4 (26 Jul 2022 06:24), Max: 36.8 (26 Jul 2022 04:00)  T(F): 97.5 (26 Jul 2022 06:24), Max: 98.3 (26 Jul 2022 04:00)  HR: 90 (26 Jul 2022 06:24) (81 - 108)  BP: 149/68 (26 Jul 2022 06:24) (103/79 - 165/83)  BP(mean): 85 (25 Jul 2022 21:00) (85 - 104)  RR: 22 (26 Jul 2022 06:24) (20 - 28)  SpO2: 100% (26 Jul 2022 06:24) (86% - 100%)    Parameters below as of 26 Jul 2022 06:24  Patient On (Oxygen Delivery Method): nasal cannula  O2 Flow (L/min): 2    General: NAD, cachectic. not following commands, hard of hearing  HEENT: MMM, poor dentition  Neck: trachea midline   Respiratory: CTA b/l. No rales, rhonchi, wheezing appreciated.  Cardiovascular: RRR. +s1/s2, -s3/s4. No murmur, rubs, gallops. No edema  Abdomen: NT/ND. +BS, No HSM.   Extremities: 2+ pulses  Skin: edematous. No rashes, ecchymoses, or petechiae noted  Neurological: a/0x1 self, incoherent, confused    LABS:                        10.4   10.23 )-----------( 119      ( 26 Jul 2022 05:45 )             33.8     07-26    153<H>  |  109<H>  |  57<H>  ----------------------------<  166<H>  3.2<L>   |  35<H>  |  1.27    Ca    8.0<L>      26 Jul 2022 05:45  Phos  3.3     07-26  Mg     2.20     07-26    TPro  5.2<L>  /  Alb  1.9<L>  /  TBili  0.9  /  DBili  x   /  AST  513<H>  /  ALT  787<H>  /  AlkPhos  84  07-25        RADIOLOGY & ADDITIONAL TESTS:  Results Reviewed:   Imaging Personally Reviewed:  r< from: Xray Chest 1 View- PORTABLE-Routine (Xray Chest 1 View- PORTABLE-Routine .) (07.22.22 @ 10:05) >    IMPRESSION:  No active pulmonary disease.    < from: Transthoracic Echocardiogram (07.13.22 @ 14:58) >  CONCLUSIONS:  LVEF 47%  1. Mitral annular calcification, otherwise normal mitral  valve. Moderate mitral regurgitation.  2. Severely dilated left atrium.  LA volume index = 57  cc/m2.  3. Normal left ventricular internal dimensions and wall  thicknesses.  4. Mild global left ventricular systolic dysfunction.  5. Increased E/e'  is consistent with elevated left  ventricular filling pressure. Moderate diastolic  dysfunction (Stage II).  6. Normal right ventricular size and function.  7. Estimated pulmonary artery systolic pressure equals 38  mm Hg, assuming right atrial pressure equals 10  mm Hg,  consistent with borderline pulmonary hypertension.    Electrocardiogram Personally Reviewed:    COORDINATION OF CARE:  Care Discussed with Consultants/Other Providers [Y/N]: MICK Nieto, CT chest   Prior or Outpatient Records Reviewed [Y/N]:

## 2022-07-26 NOTE — GOALS OF CARE CONVERSATION - ADVANCED CARE PLANNING - CONVERSATION DETAILS
discussed with daughter and son-in law- pleasure feeds     introduced hospice     complete note to follow Referral for complex decision making and symptom management in setting of advanced illness. Spoke with daughter, Maddie ([phone]) and introduced role of palliative care in assisting with complex decision making. Maddie explained that patient was previously living with his wife who also has dementia but they had a HHA that came to assist with ADLs and family would visit them to supplement the remaining time without the aid. Maddie recognizes that her father's condition has declined and feels that she has not seen improvement in his mental status despite the aggressive medical care he has received. Discussed nutritional goc as Maddie is aware that patient failed S&S. Explained the option of pleasure feeds vs. artificial nutrition. Maddie understands the risk of artificial nutrition and the potential for self-harm if patient were to pull the tube out, risk of infection, etc. She asked that provider speak with her , Dr. Ramachandran, as well but she was in agreement with pleasure feeds. She wants to focus on patient's comfort. I also explained the philosophy of hospice services in setting of advancing dementia. She recognizes that her father is requiring more care and is unable to be managed at home. Maddie was overwhelmed as she has also been helping to manage phone calls for patient's wife who is at [place]. I explained that I would speak to her  and share our discussion with him.     Palliative provider spoke with Dr. Ramachandran and introduced role of palliative care to him. Explained my discussion with his wife. He expressed understanding and was also in agreement with pleasure feeds. Discussed hospice benefit with him as well. He also recognizes that his parent in laws require 24 hour care and family is in the process of looking at the finances for long term care. He stated he will speak to his wife and brother in law- Baron to discuss transition to long term care and potentially adding hospice on as well.

## 2022-07-26 NOTE — SWALLOW BEDSIDE ASSESSMENT ADULT - SWALLOW EVAL: DIAGNOSIS
Severe OroPharyngeal dysphagia given puree via teaspoon marked by reduced utensil stripping for which SLP utilized patient's upper lip to assist in bolus retrieval. Patient with absent bolus manipulation and bolus holding in oral cavity with no attempts to transport/transfer bolus posteriorly despite maximum SLP verbal encouragement and prompting. Patient with absent swallow trigger. SLP manually removed bolus from oral cavity via Yankauer suction. Further trials deferred given severity of oral stage deficits. Patient not receptive to PO trials.
Unable to assess oral and pharyngeal stage of swallow as patient refused to participate in evaluation, swatting away SLP  and tsp and cup presentation provided despite encouragement provided.
Mild oral stage deficits given thin liquids, mildly thick liquids and puree marked by adequate bolus containment, slowed bolus manipulation and slowed anterior-posterior transport. adequate oral clearance noted post primary swallow. Pharyngeal stage marked by observed initiation of the pharyngeal swallow trigger judged via laryngeal palpation. Patient observed with immediate cough response for thin liquids, possibly indicative of impaired airway protection. No overt s/sx of impaired airway protection observed for puree and mildly thick liquids.
SLP attempted trials of puree via teaspoon for which patient pushed/swatted SLP arm away and turned away. SLP and RN provided max verbal encouragement and prompting, however patient continued to refuse PO trials. Patient with adamant refusal marked by tight labial seal, turning head and body away from SLP, and pushing SLP away. Oral/pharyngeal stages of swallow could not be assessed given patient's refusal to accept PO intake. Further PO trials deferred.

## 2022-07-26 NOTE — SWALLOW BEDSIDE ASSESSMENT ADULT - ADDITIONAL RECOMMENDATIONS
1. Monitor for diet tolerance and reconsult this department as indicated. 2. This department to follow up as schedule permits
1) This service to follow up as schedule permits. 2) Reconsult this service when patient is noted with improvement in medical and cognitive status to assess for candidacy of an oral diet.
1. reconsult this department as patient becomes more willing to participate in evaluation/accept PO trials. 2. This department to follow up as schedule permits.
1) Reconsult this service as patient is noted with medical improvement to assess for candidacy of an oral diet. 2) This service to follow up as schedule permits

## 2022-07-26 NOTE — PROGRESS NOTE ADULT - ASSESSMENT
89M PMHx dementia (AAO x 2 at baseline), Afib on eliquis, DM, HTN, [place], recently COVID-19+ on 7/10 s/p Dexamethasone and Remdesivir, presenting for AMS with worsening confusion and decreased responsiveness, found to be in septic shock secondary to acute metabolic acidosis vs aspiration PNA    Neuro  #baseline dementia  - Agitation 2/2 baseline dementia - haldol PRN when QTc is appropriate    Pulm  #DDx aspiration PNA  -on NC  -CXR clear  -possible aspiration PNA given consolidation over right lung base, will cover broadly with Zosyn    Cardiac  - No longer requiring pressors      GI  - NPO given possible aspiration   - Discussed NG tube vs. pleasure feeds as patient failed speech/swallow eval with son-in-law, will hold off at this time    Renal  #TENZIN, prerenal  #severe metabolic acidosis  -trend CMP    Endo  #DM  -FS q6h and SSI  - has been hyperglycemic since receiving steroid    ID  #aspiration pna  on zosyn    Heme/Onc  -held OAc pending imaging    PPx  -SCD for DVT    GOC  -D/w family, plan to pursue trial of pressor support and abx, forgoing HD/RRT

## 2022-07-26 NOTE — SWALLOW BEDSIDE ASSESSMENT ADULT - SWALLOW EVAL: RECOMMENDED DIET
1) Defer PO diet to MD given patient's adament refusel marked by pushing SLP away and tight labial seal. 2) Consider nutritional goals of care discussion with patient and family (i.e., pleasure feeds vs non-oral means). 1) Defer PO diet to MD given patient's adamant refusal marked by pushing SLP away and tight labial seal. 2) Consider nutritional goals of care discussion with patient and family (i.e., pleasure feeds vs non-oral means).

## 2022-07-26 NOTE — PROGRESS NOTE ADULT - PROBLEM SELECTOR PLAN 1
DDx aspiration PNA  -on NC 2L  -CXR clear  -per ICU team, possible aspiration PNA given consolidation over right lung base, on zosyn empirically  -will get CT chest  -speech/swallow eval, pt is currently NPO d/t aspiration risk  -overall prognosis poor with multiple comorbidities, palliative care consult ordered  ICU team d/w family while he was in the unit, agree with trial of pressor support and abx, forgoing HD/RRT, hold off NGT at this time  -DNR DDx aspiration PNA  -on NC 2L, recent covid infection 7/10 s/p rem/dex  -CXR clear  -per ICU team, possible aspiration PNA given consolidation over right lung base, on zosyn empirically  -will get CT chest  -speech/swallow eval, pt is currently NPO d/t aspiration risk  -overall prognosis poor with multiple comorbidities, palliative care consult ordered  ICU team d/w family while he was in the unit, agree with trial of pressor support and abx, forgoing HD/RRT, hold off NGT at this time  -DNR

## 2022-07-26 NOTE — PROGRESS NOTE ADULT - PROBLEM SELECTOR PLAN 2
#baseline dementia  - Agitation 2/2 baseline dementia - haldol PRN when QTc is appropriate (<500ms)  - trial of iv thiamine as he appears malnourished  -avoid benzo/sedatives, frequent reorientation # acute metabolic encephalopathy superimposed on baseline dementia  - Agitation 2/2 baseline dementia - haldol PRN when QTc is appropriate (<500ms)  - trial of iv thiamine as he appears malnourished  -avoid benzo/sedatives, frequent reorientation

## 2022-07-26 NOTE — SWALLOW BEDSIDE ASSESSMENT ADULT - SWALLOW EVAL: ORAL MUSCULATURE
poor participation/unable to assess due to poor participation/comprehension
unable to assess due to poor participation/comprehension
unable to assess due to poor participation/comprehension
generally intact

## 2022-07-26 NOTE — CHART NOTE - NSCHARTNOTEFT_GEN_A_CORE
NUTRITION FOLLOW-UP:    89M PMH dementia (AAO x 2 at baseline), Afib, DM, HTN, [place], recently COVID-19+ on 7/10 s/p Dexamethasone and Remdesivir, presenting for AMS with worsening confusion and decreased responsiveness, found to be in septic shock secondary to acute metabolic acidosis vs aspiration PNA    Pt f/u for severe protein calorie malnutrition.  As per SLP recommendation (7/24/22)- NPO with non- oral means of nutrition. As per GOC- discussed with daughter and son-in law- pleasure feeds. Will remain available as needed.       Weight: 145.9# (7/24/22)    Labs:  07-26 Na 153 mmol/L<H> Glu 166 mg/dL<H> K+ 3.2 mmol/L<L> Cr 1.27 mg/dL BUN 57 mg/dL<H> Phos 3.3 mg/dL  07-26 @ 11:48 POCT 206 mg/dL  07-26 @ 07:31 POCT 175 mg/dL  07-26 @ 00:32 POCT 195 mg/dL  07-25 @ 17:31 POCT 202 mg/dL    Current Diet: Diet, NPO (07-24-22 @ 19:45)    Skin- ecchymosis as per RN flow sheet  Estimated needs: NO changes since previous assessment     Nutrition Diagnosis:  Ongoing- Severe Malnutrition    RD to Remain Available as needed.  GOC- Pleasure feeds per family decision.      Jodee Anderson RDN #[id number] NUTRITION FOLLOW-UP:    89M PMH dementia (AAO x 2 at baseline), Afib, DM, HTN, [place], recently COVID-19+ on 7/10 s/p Dexamethasone and Remdesivir, presenting for AMS with worsening confusion and decreased responsiveness, found to be in septic shock secondary to acute metabolic acidosis vs aspiration PNA    Pt f/u for severe protein calorie malnutrition.  As per SLP recommendation (7/24/22)- NPO with non- oral means of nutrition.  MD Discussed NG tube vs. pleasure feeds as patient failed speech/swallow eval.  As per GOC discussion with daughter and son-in law - Pleasure Feeds.   Will remain available as needed.       Weight: 145.9# (7/24/22)    Labs:  07-26 Na 153 mmol/L<H> Glu 166 mg/dL<H> K+ 3.2 mmol/L<L> Cr 1.27 mg/dL BUN 57 mg/dL<H> Phos 3.3 mg/dL  07-26 @ 11:48 POCT 206 mg/dL  07-26 @ 07:31 POCT 175 mg/dL  07-26 @ 00:32 POCT 195 mg/dL  07-25 @ 17:31 POCT 202 mg/dL    Current Diet: Diet, NPO (07-24-22 @ 19:45)    Skin- ecchymosis as per RN flow sheet  Estimated needs: NO changes since previous assessment     Nutrition Diagnosis:  Ongoing- Severe Malnutrition    RD to Remain Available as needed.  GOC- Pleasure feeds per family decision.      Jodee Anderson RDN #[id number]

## 2022-07-26 NOTE — CONSULT NOTE ADULT - SUBJECTIVE AND OBJECTIVE BOX
[facility] Geriatrics and Palliative Care  Bernie Us, Palliative Care Attending  Contact Info: Page [phone] (including Nights/Weekends), message on Microsoft Teams (Bernie Us), or leave VM at Palliative Office [phone] (non-urgent)     HPI: 89M PMHx dementia (AAO x 2 at baseline), Afib on eliquis, DM, HTN, hard of hearing who presents with cough, sneezing, and increased confusion at home after Covid exposure Wednesday July 6. Of note, patient currently AAO x 1 is unable to provide any ROS or contribution to information regarding symptoms due to dementia/delirium. As per his son in law Dr. Zeinab Frost (cardiologist; [phone]), his baseline mental status is AAO x 2 usually, able to basically care for himself for the most part with help of HHA at home. Ambulates independently. As per my discussion with his daughter Maddie, patient has been coughing and sneezing and has been "sundowning" at home, more confused than baseline. He lives with his wife who is currently in ER as well, who also presented with Covid and cognitive decline/increasing confusion. Patient denies having any pain when asked in Estonian using , as well as staff at bedside who speaks Estonian. Denied having any other complaints but very limited history as noted above. In the ED, he was noted to have wheezing and mild tachypnea, was given duonebs and decadron, 500cc LR, and haldol 2.5mg IV for agitation. He was noted to have fever of 100.8F and be saturating well on room air.  (11 Jul 2022 08:33)    PERTINENT PM/SXH:   Afib  Diabetes mellitus  Hypertension  Hard of hearing  Dementia  Cataract, bilateral    No significant past surgical history    FAMILY HISTORY:  No pertinent family history in first degree relatives    SOCIAL HISTORY:   Significant other/partner[ x]  Children[ x]  Sabianism/Spirituality:  Substance hx:  [ ]   Tobacco hx:  [ ]   Alcohol hx: [ ]   Home Opioid hx:  [ ] I-Stop Reference No: [id number]  Living Situation: [x ]Home  [ ]Long term care  [ ]Rehab [ ]Other    ADVANCE DIRECTIVES:    DNR/MOLST  [ ]  Living Will  [ ]   DECISION MAKER(s):  [ ] Health Care Proxy(s)  [ ] Surrogate(s)  [ ] Guardian           Name(s): Phone Number(s):    BASELINE (I)ADL(s) (prior to admission):  [place]: [ ]Total  [ ] Moderate [ ]Dependent    Allergies    No Known Allergies    Intolerances    MEDICATIONS  (STANDING):  ascorbic acid 500 milliGRAM(s) Oral daily  dextrose 5%. 1000 milliLiter(s) (100 mL/Hr) IV Continuous <Continuous>  dextrose 5%. 1000 milliLiter(s) (50 mL/Hr) IV Continuous <Continuous>  dextrose 5%. 1000 milliLiter(s) (75 mL/Hr) IV Continuous <Continuous>  dextrose 5%. 1000 milliLiter(s) (50 mL/Hr) IV Continuous <Continuous>  dextrose 5%. 1000 milliLiter(s) (100 mL/Hr) IV Continuous <Continuous>  dextrose 50% Injectable 25 Gram(s) IV Push once  dextrose 50% Injectable 12.5 Gram(s) IV Push once  dextrose 50% Injectable 25 Gram(s) IV Push once  dextrose 50% Injectable 25 Gram(s) IV Push once  dextrose 50% Injectable 12.5 Gram(s) IV Push once  dextrose 50% Injectable 25 Gram(s) IV Push once  donepezil 5 milliGRAM(s) Oral at bedtime  glucagon  Injectable 1 milliGRAM(s) IntraMuscular once  glucagon  Injectable 1 milliGRAM(s) IntraMuscular once  heparin   Injectable 5000 Unit(s) SubCutaneous every 12 hours  insulin glargine Injectable (LANTUS) 10 Unit(s) SubCutaneous at bedtime  insulin lispro (ADMELOG) corrective regimen sliding scale   SubCutaneous every 6 hours  melatonin 3 milliGRAM(s) Oral at bedtime  multivitamin 1 Tablet(s) Oral daily  pantoprazole    Tablet 40 milliGRAM(s) Oral before breakfast  piperacillin/tazobactam IVPB.. 3.375 Gram(s) IV Intermittent every 12 hours  potassium chloride  10 mEq/100 mL IVPB 10 milliEquivalent(s) IV Intermittent every 1 hour  QUEtiapine 12.5 milliGRAM(s) Oral at bedtime  simvastatin 20 milliGRAM(s) Oral at bedtime  thiamine IVPB 500 milliGRAM(s) IV Intermittent daily    MEDICATIONS  (PRN):  acetaminophen     Tablet .. 650 milliGRAM(s) Oral every 4 hours PRN Temp greater or equal to 38C (100.4F), Severe Pain (7 - 10)  ALBUTerol    90 MICROgram(s) HFA Inhaler 2 Puff(s) Inhalation every 6 hours PRN Bronchospasm  dextrose Oral Gel 15 Gram(s) Oral once PRN Blood Glucose LESS THAN 70 milliGRAM(s)/deciliter  dextrose Oral Gel 15 Gram(s) Oral once PRN Blood Glucose LESS THAN 70 milliGRAM(s)/deciliter  guaiFENesin Oral Liquid (Sugar-Free) 100 milliGRAM(s) Oral every 6 hours PRN Cough  metoprolol tartrate Injectable 5 milliGRAM(s) IV Push every 6 hours PRN sustained HR over 140 bpm    PRESENT SYMPTOMS: [ ]Unable to self-report  [ ] CPOT [ ] PAINADs [ ] RDOS  Source if other than patient:  [ ]Family   [ ]Team     Pain: [ ]yes [ ]no  QOL impact -   Location -                    Aggravating factors -  Quality -  Radiation -  Timing-  Severity (0-10 scale):  Minimal acceptable level (0-10 scale):     CPOT:    https://www.[facility].org/getattachment/[email or web address]/Critical-Care-Pain-Observation-Tool-(CPOT)    PAIN AD Score:   http://[email or web address]/cog/painad.pdf (press ctrl +  left click to view)    Dyspnea:                           [ ]Mild [ ]Moderate [ ]Severe      RDOS:  0 to 2  minimal or no respiratory distress   3  mild distress  4 to 6 moderate distress  >7 severe distress  https://[email or web address]/handouts/hen/Respiratory_Distress_Observation_Scale.pdf (Ctrl +  left click to view)     Anxiety:                             [ ]Mild [ ]Moderate [ ]Severe  Fatigue:                             [ ]Mild [ ]Moderate [ ]Severe  Nausea:                             [ ]Mild [ ]Moderate [ ]Severe  Loss of appetite:              [ ]Mild [ ]Moderate [ ]Severe  Constipation:                    [ ]Mild [ ]Moderate [ ]Severe    PCSSQ[Palliative Care Spiritual Screening Question]   Severity (0-10):  Chaplaincy Referral: [ ] yes [ ] refused [ ] following    Other Symptoms:  [ ]All other review of systems negative     Palliative Performance Status Version 2:         %    http://npcrc.org/files/news/palliative_performance_scale_ppsv2.pdf  PHYSICAL EXAM:  Vital Signs Last 24 Hrs  T(C): 36.4 (26 Jul 2022 06:24), Max: 36.8 (26 Jul 2022 04:00)  T(F): 97.5 (26 Jul 2022 06:24), Max: 98.3 (26 Jul 2022 04:00)  HR: 90 (26 Jul 2022 06:24) (81 - 108)  BP: 149/68 (26 Jul 2022 06:24) (103/79 - 165/83)  BP(mean): 85 (25 Jul 2022 21:00) (85 - 104)  RR: 22 (26 Jul 2022 06:24) (20 - 28)  SpO2: 100% (26 Jul 2022 06:24) (86% - 100%)    Parameters below as of 26 Jul 2022 06:24  Patient On (Oxygen Delivery Method): nasal cannula  O2 Flow (L/min): 2   I&O's Summary    25 Jul 2022 07:01  -  26 Jul 2022 07:00  --------------------------------------------------------  IN: 200 mL / OUT: 370 mL / NET: -170 mL      GENERAL: [ ]Cachexia    [ ]Alert  [ ]Oriented x   [ ]Lethargic  [ ]Unarousable  [ ]Verbal  [ ]Non-Verbal  Behavioral:   [ ] Anxiety  [ ] Delirium [ ] Agitation [ ] Other  HEENT:  [ ]Normal   [ ]Dry mouth   [ ]ET Tube/Trach  [ ]Oral lesions  PULMONARY:   [ ]Clear [ ]Tachypnea  [ ]Audible excessive secretions   [ ]Rhonchi        [ ]Right [ ]Left [ ]Bilateral  [ ]Crackles        [ ]Right [ ]Left [ ]Bilateral  [ ]Wheezing     [ ]Right [ ]Left [ ]Bilateral  [ ]Diminished breath sounds [ ]right [ ]left [ ]bilateral  CARDIOVASCULAR:    [ ]Regular [ ]Irregular [ ]Tachy  [ ]Zay [ ]Murmur [ ]Other  GASTROINTESTINAL:  [ ]Soft  [ ]Distended   [ ]+BS  [ ]Non tender [ ]Tender  [ ]Other [ ]PEG [ ]OGT/ NGT  Last BM:  GENITOURINARY:  [ ]Normal [ ] Incontinent   [ ]Oliguria/Anuria   [ ]Jacobo  MUSCULOSKELETAL:   [ ]Normal   [ ]Weakness  [ ]Bed/Wheelchair bound [ ]Edema  NEUROLOGIC:   [ ]No focal deficits  [ ]Cognitive impairment  [ ]Dysphagia [ ]Dysarthria [ ]Paresis [ ]Other   SKIN:   [ ]Normal  [ ]Rash  [ ]Other  [ ]Pressure ulcer(s)       Present on admission [ ]y [ ]n    CRITICAL CARE:  [ ] Shock Present  [ ]Septic [ ]Cardiogenic [ ]Neurologic [ ]Hypovolemic  [ ]  Vasopressors [ ]  Inotropes   [ ]Respiratory failure present [ ]Mechanical ventilation [ ]Non-invasive ventilatory support [ ]High flow    [ ]Acute  [ ]Chronic [ ]Hypoxic  [ ]Hypercarbic [ ]Other  [ ]Other organ failure     LABS:                        10.4   10.23 )-----------( 119      ( 26 Jul 2022 05:45 )             33.8   07-26    153<H>  |  109<H>  |  57<H>  ----------------------------<  166<H>  3.2<L>   |  35<H>  |  1.27    Ca    8.0<L>      26 Jul 2022 05:45  Phos  3.3     07-26  Mg     2.20     07-26    TPro  5.2<L>  /  Alb  1.9<L>  /  TBili  0.9  /  DBili  x   /  AST  513<H>  /  ALT  787<H>  /  AlkPhos  84  07-25        RADIOLOGY & ADDITIONAL STUDIES:    PROTEIN CALORIE MALNUTRITION PRESENT: [ ]mild [ ]moderate [ ]severe [ ]underweight [ ]morbid obesity  https://www.andeal.org/vault/2440/web/files/ONC/Table_Clinical%20Characteristics%20to%20Document%20Malnutrition-White%20JV%20et%20al%202012.pdf    Height (cm): 172.7 (07-22-22 @ 22:01)  Weight (kg): 64.7 (07-12-22 @ 06:00), 73.0012 (03-03-22 @ 12:00)  BMI (kg/m2): 21.7 (07-22-22 @ 22:01)    [ ]PPSV2 < or = to 30% [ ]significant weight loss  [ ]poor nutritional intake  [ ]anasarca[ ]Artificial Nutrition      Other REFERRALS:  [ ]Hospice  [ ]Child Life  [ ]Social Work  [ ]Case management [ ]Holistic Therapy     Goals of Care Document: SOPHIE Ibarra (07-23-22 @ 00:45)  Goals of Care Conversation:   Advance Directives:  · Caregiver:  yes  · Name  Patient does not remember  · Phone Number  Patient does not remember    Conversation Discussion:  · Conversation  Diagnosis; Prognosis  · Conversation Details  D/W daughter who is the HCP and son as well as her  who is an MD> Patient in the MICU in shock on high dose of leveophed with TENZIN , severe metabolic acidosis and lactate of12.     D/w family GOC, patient already DNR and DNI which they would like to continue. D/W family about his renal failure and possibility of renal replacement therapy. After discussion including alternatives, risk and benefit family agrees that HD/RRT is not in the best interest of the patient. Would like a trial of vasopressors and abx.    Personal Advance Directives Treatment Guidelines:   Treatment Guidelines:  · Decision Maker  Health Care Proxy    MOLST:  · Completed  12-Jul-2022    Location of Discussion:   Time Spent on Advance Care Planning:  I spent 35 (in minutes) on advance care planning services with the patient.  This time is separate and distinct from any other care management services provided on this date.    Location of Discussion:  · Location of discussion  Face to face      Electronic Signatures:  Kim, Dae Hyeon (MD)  (Signed 23-Jul-2022 00:49)  	Authored: Goals of Care Conversation, Personal Advance Directives Treatment Guidelines, Location of Discussion      Last Updated: 23-Jul-2022 00:49 by Kim, Dae Hyeon (MD)     [facility] Geriatrics and Palliative Care  Bernie Us, Palliative Care Attending  Contact Info: Page [phone] (including Nights/Weekends), message on Microsoft Teams (Bernie Us), or leave VM at Palliative Office [phone] (non-urgent)     HPI: 89M PMHx dementia (AAO x 2 at baseline), Afib on eliquis, DM, HTN, hard of hearing who presents with cough, sneezing, and increased confusion at home after Covid exposure Wednesday July 6. Of note, patient currently AAO x 1 is unable to provide any ROS or contribution to information regarding symptoms due to dementia/delirium. As per his son in law Dr. Zeinab Frost (cardiologist; [phone]), his baseline mental status is AAO x 2 usually, able to basically care for himself for the most part with help of HHA at home. Ambulates independently. As per my discussion with his daughter Maddie, patient has been coughing and sneezing and has been "sundowning" at home, more confused than baseline. He lives with his wife who is currently in ER as well, who also presented with Covid and cognitive decline/increasing confusion. Patient denies having any pain when asked in Romanian using , as well as staff at bedside who speaks Romanian. Denied having any other complaints but very limited history as noted above. In the ED, he was noted to have wheezing and mild tachypnea, was given duonebs and decadron, 500cc LR, and haldol 2.5mg IV for agitation. He was noted to have fever of 100.8F and be saturating well on room air.  (11 Jul 2022 08:33)    PERTINENT PM/SXH:   Afib  Diabetes mellitus  Hypertension  Hard of hearing  Dementia  Cataract, bilateral    No significant past surgical history    FAMILY HISTORY:  No pertinent family history in first degree relatives    SOCIAL HISTORY:   Significant other/partner[ x]  Children[ x]  Baptist/Spirituality:   Substance hx:  [ ]   Tobacco hx:  [ ]   Alcohol hx: [ ]   Home Opioid hx:  [ ] I-Stop Reference No: [id number]  Living Situation: [x ]Home  [ ]Long term care  [ ]Rehab [ ]Other    ADVANCE DIRECTIVES:    DNR/MOLST  [x ]  Living Will  [ ]   DECISION MAKER(s): Patient's children- Maddie and Baron.   [ ] Health Care Proxy(s)  [ ] Surrogate(s)  [ ] Guardian           Name(s): Maddie (daughter) Phone Number(s): [phone]    BASELINE (I)ADL(s) (prior to admission): Per daughter, patient was living with his wife and they both had an aid that came for a 4-6hours/5 days. Patient had memory impairments prior to hospitalization and was requiring more assistance with ADLs.   [place]: [ ]Total  [ ] Moderate [ x]Dependent    Allergies    No Known Allergies    Intolerances    MEDICATIONS  (STANDING):  ascorbic acid 500 milliGRAM(s) Oral daily  dextrose 5%. 1000 milliLiter(s) (100 mL/Hr) IV Continuous <Continuous>  dextrose 5%. 1000 milliLiter(s) (50 mL/Hr) IV Continuous <Continuous>  dextrose 5%. 1000 milliLiter(s) (75 mL/Hr) IV Continuous <Continuous>  dextrose 5%. 1000 milliLiter(s) (50 mL/Hr) IV Continuous <Continuous>  dextrose 5%. 1000 milliLiter(s) (100 mL/Hr) IV Continuous <Continuous>  dextrose 50% Injectable 25 Gram(s) IV Push once  dextrose 50% Injectable 12.5 Gram(s) IV Push once  dextrose 50% Injectable 25 Gram(s) IV Push once  dextrose 50% Injectable 25 Gram(s) IV Push once  dextrose 50% Injectable 12.5 Gram(s) IV Push once  dextrose 50% Injectable 25 Gram(s) IV Push once  donepezil 5 milliGRAM(s) Oral at bedtime  glucagon  Injectable 1 milliGRAM(s) IntraMuscular once  glucagon  Injectable 1 milliGRAM(s) IntraMuscular once  heparin   Injectable 5000 Unit(s) SubCutaneous every 12 hours  insulin glargine Injectable (LANTUS) 10 Unit(s) SubCutaneous at bedtime  insulin lispro (ADMELOG) corrective regimen sliding scale   SubCutaneous every 6 hours  melatonin 3 milliGRAM(s) Oral at bedtime  multivitamin 1 Tablet(s) Oral daily  pantoprazole    Tablet 40 milliGRAM(s) Oral before breakfast  piperacillin/tazobactam IVPB.. 3.375 Gram(s) IV Intermittent every 12 hours  potassium chloride  10 mEq/100 mL IVPB 10 milliEquivalent(s) IV Intermittent every 1 hour  QUEtiapine 12.5 milliGRAM(s) Oral at bedtime  simvastatin 20 milliGRAM(s) Oral at bedtime  thiamine IVPB 500 milliGRAM(s) IV Intermittent daily    MEDICATIONS  (PRN):  acetaminophen     Tablet .. 650 milliGRAM(s) Oral every 4 hours PRN Temp greater or equal to 38C (100.4F), Severe Pain (7 - 10)  ALBUTerol    90 MICROgram(s) HFA Inhaler 2 Puff(s) Inhalation every 6 hours PRN Bronchospasm  dextrose Oral Gel 15 Gram(s) Oral once PRN Blood Glucose LESS THAN 70 milliGRAM(s)/deciliter  dextrose Oral Gel 15 Gram(s) Oral once PRN Blood Glucose LESS THAN 70 milliGRAM(s)/deciliter  guaiFENesin Oral Liquid (Sugar-Free) 100 milliGRAM(s) Oral every 6 hours PRN Cough  metoprolol tartrate Injectable 5 milliGRAM(s) IV Push every 6 hours PRN sustained HR over 140 bpm    PRESENT SYMPTOMS: [x ]Unable to self-report  [ ] CPOT [ x] PAINADs [ ] RDOS  Source if other than patient:  [ ]Family   [ ]Team     Pain: [ ]yes [ ]no  QOL impact -   Location -                    Aggravating factors -  Quality -  Radiation -  Timing-  Severity (0-10 scale):  Minimal acceptable level (0-10 scale):     CPOT:  n/a   https://www.[facility].org/getattachment/[email or web address]/Critical-Care-Pain-Observation-Tool-(CPOT)    PAIN AD Score: 1  http://[email or web address]/cog/painad.pdf (press ctrl +  left click to view)    Dyspnea:                           [ ]Mild [ ]Moderate [ ]Severe      RDOS: n/a   0 to 2  minimal or no respiratory distress   3  mild distress  4 to 6 moderate distress  >7 severe distress  https://[email or web address]/handouts/hen/Respiratory_Distress_Observation_Scale.pdf (Ctrl +  left click to view)     Anxiety:                             [ ]Mild [ ]Moderate [ ]Severe  Fatigue:                             [ ]Mild [ ]Moderate [ ]Severe  Nausea:                             [ ]Mild [ ]Moderate [ ]Severe  Loss of appetite:              [ ]Mild [ ]Moderate [ ]Severe  Constipation:                    [ ]Mild [ ]Moderate [ ]Severe    PCSSQ[Palliative Care Spiritual Screening Question]   Severity (0-10):  Chaplaincy Referral: [ ] yes [ ] refused [ ] following    Other Symptoms:  [ ]All other review of systems negative     Palliative Performance Status Version 2:   30      %    http://[email or web address].org/files/news/palliative_performance_scale_[email or web address].pdf  PHYSICAL EXAM:  Vital Signs Last 24 Hrs  T(C): 36.4 (26 Jul 2022 06:24), Max: 36.8 (26 Jul 2022 04:00)  T(F): 97.5 (26 Jul 2022 06:24), Max: 98.3 (26 Jul 2022 04:00)  HR: 90 (26 Jul 2022 06:24) (81 - 108)  BP: 149/68 (26 Jul 2022 06:24) (103/79 - 165/83)  BP(mean): 85 (25 Jul 2022 21:00) (85 - 104)  RR: 22 (26 Jul 2022 06:24) (20 - 28)  SpO2: 100% (26 Jul 2022 06:24) (86% - 100%)    Parameters below as of 26 Jul 2022 06:24  Patient On (Oxygen Delivery Method): nasal cannula  O2 Flow (L/min): 2   I&O's Summary    25 Jul 2022 07:01  -  26 Jul 2022 07:00  --------------------------------------------------------  IN: 200 mL / OUT: 370 mL / NET: -170 mL      GENERAL: [ ]Cachexia    [x ]Alert  [ ]Oriented x   [ ]Lethargic  [ ]Unarousable  [x ]Verbal - unintelligible speech despite using   [ ]Non-Verbal  Behavioral:   [ ] Anxiety  [ ] Delirium [ ] Agitation [x ] Other  HEENT:   [ ]Normal   [ x]Dry mouth   [ ]ET Tube/Trach  [ ]Oral lesions  PULMONARY:   [ ]Clear [ ]Tachypnea  [ ]Audible excessive secretions   [ ]Rhonchi        [ ]Right [ ]Left [ ]Bilateral  [ ]Crackles        [ ]Right [ ]Left [ ]Bilateral  [ ]Wheezing     [ ]Right [ ]Left [ ]Bilateral  [x ]Diminished breath sounds [ ]right [ ]left [x ]bilateral  CARDIOVASCULAR:    [x ]Regular [ ]Irregular [ ]Tachy  [ ]Zay [ ]Murmur [ ]Other  GASTROINTESTINAL:  [x ]Soft  [ ]Distended   [ ]+BS  [x ]Non tender [ ]Tender  [ ]Other [ ]PEG [ ]OGT/ NGT  Last BM: 7/23  GENITOURINARY:  [ ]Normal [x ] Incontinent   [ ]Oliguria/Anuria   [ ]Jacobo  MUSCULOSKELETAL:   [ ]Normal   [ x]Weakness  [x ]Bed/Wheelchair bound [ ]Edema  NEUROLOGIC:   [ ]No focal deficits  [x ]Cognitive impairment  [ x]Dysphagia [ ]Dysarthria [ ]Paresis [ ]Other   SKIN: Please see flowsheets   [x ]Normal  [ ]Rash  [ ]Other  [ ]Pressure ulcer(s)       Present on admission [ ]y [ ]n    CRITICAL CARE:  [ ] Shock Present  [ ]Septic [ ]Cardiogenic [ ]Neurologic [ ]Hypovolemic  [ ]  Vasopressors [ ]  Inotropes   [ ]Respiratory failure present [ ]Mechanical ventilation [ ]Non-invasive ventilatory support [ ]High flow    [ ]Acute  [ ]Chronic [ ]Hypoxic  [ ]Hypercarbic [ ]Other  [ ]Other organ failure     LABS:                        10.4   10.23 )-----------( 119      ( 26 Jul 2022 05:45 )             33.8   07-26    153<H>  |  109<H>  |  57<H>  ----------------------------<  166<H>  3.2<L>   |  35<H>  |  1.27    Ca    8.0<L>      26 Jul 2022 05:45  Phos  3.3     07-26  Mg     2.20     07-26    TPro  5.2<L>  /  Alb  1.9<L>  /  TBili  0.9  /  DBili  x   /  AST  513<H>  /  ALT  787<H>  /  AlkPhos  84  07-25        RADIOLOGY & ADDITIONAL STUDIES: < from: Xray Chest 1 View- PORTABLE-Routine (Xray Chest 1 View- PORTABLE-Routine .) (07.22.22 @ 10:05) >    INTERPRETATION:  Chest one view    HISTORY: Leukocytosis    COMPARISON STUDY: 7/13/2022    Frontal expiratory view of the chest shows the heart to be similar in   size. Various rib calcifications are again noted.    The lungs are clear and there is no evidence of pneumothorax nor pleural   effusion.    IMPRESSION:  No active pulmonary disease.    < end of copied text >      PROTEIN CALORIE MALNUTRITION PRESENT: [ ]mild [ ]moderate [ ]severe [ ]underweight [ ]morbid obesity  https://www.andeal.org/vault/2440/web/files/ONC/Table_Clinical%20Characteristics%20to%20Document%20Malnutrition-White%20JV%20et%20al%202012.pdf    Height (cm): 172.7 (07-22-22 @ 22:01)  Weight (kg): 64.7 (07-12-22 @ 06:00), 73.0012 (03-03-22 @ 12:00)  BMI (kg/m2): 21.7 (07-22-22 @ 22:01)    [ ]PPSV2 < or = to 30% [ ]significant weight loss  [ ]poor nutritional intake  [ ]anasarca[ ]Artificial Nutrition      Other REFERRALS:  [ ]Hospice  [ ]Child Life  [ ]Social Work  [ ]Case management [ ]Holistic Therapy     Goals of Care Document: SOPHIE Ibarra (07-23-22 @ 00:45)  Goals of Care Conversation:   Advance Directives:  · Caregiver:  yes  · Name  Patient does not remember  · Phone Number  Patient does not remember    Conversation Discussion:  · Conversation  Diagnosis; Prognosis  · Conversation Details  D/W daughter who is the HCP and son as well as her  who is an MD> Patient in the MICU in shock on high dose of leveophed with TENZIN , severe metabolic acidosis and lactate of12.     D/w family GOC, patient already DNR and DNI which they would like to continue. D/W family about his renal failure and possibility of renal replacement therapy. After discussion including alternatives, risk and benefit family agrees that HD/RRT is not in the best interest of the patient. Would like a trial of vasopressors and abx.    Personal Advance Directives Treatment Guidelines:   Treatment Guidelines:  · Decision Maker  Health Care Proxy    MOLST:  · Completed  12-Jul-2022    Location of Discussion:   Time Spent on Advance Care Planning:  I spent 35 (in minutes) on advance care planning services with the patient.  This time is separate and distinct from any other care management services provided on this date.    Location of Discussion:  · Location of discussion  Face to face      Electronic Signatures:  Kim, Dae Hyeon (MD)  (Signed 23-Jul-2022 00:49)  	Authored: Goals of Care Conversation, Personal Advance Directives Treatment Guidelines, Location of Discussion      Last Updated: 23-Jul-2022 00:49 by Kim, Dae Hyeon (MD)

## 2022-07-26 NOTE — CONSULT NOTE ADULT - PROBLEM SELECTOR RECOMMENDATION 4
DNR/DNI, MOLST completed on 7/12 prior to palliative consult   Discussed nutritional goc and introduced hospice to patient's family today. Please see separate goc note  They are in agreement with pleasure feeds at this time.

## 2022-07-26 NOTE — CONSULT NOTE ADULT - PROBLEM SELECTOR RECOMMENDATION 5
Thank you for allowing us to participate in your patient's care. We will continue to follow with you. Please page [phone] for any q's or c's.     Bernie Us D.O.   Palliative Medicine Thank you for allowing us to participate in your patient's care. Please page [phone] for any q's or c's.     Bernie Us D.O.   Palliative Medicine

## 2022-07-26 NOTE — SWALLOW BEDSIDE ASSESSMENT ADULT - ASR SWALLOW ASPIRATION MONITOR
change of breathing pattern/cough/gurgly voice/fever/pneumonia/throat clearing/upper respiratory infection
change of breathing pattern/gurgly voice/fever/pneumonia/upper respiratory infection

## 2022-07-26 NOTE — CONSULT NOTE ADULT - PROBLEM SELECTOR RECOMMENDATION 9
Patient with S&S evals on 7/24 and 7/26- PO contraindicated.   Nutritional goc discussion with patient's family (daughter and son-in law). They are in agreement with pleasure/comfort feeds with modified diet. They understand risk of aspiration with gentle hand feeding.   Aspiration precautions   Ensure patient is sitting up right and awake when being fed.

## 2022-07-26 NOTE — CONSULT NOTE ADULT - ASSESSMENT
89M PMHx dementia (AAO x 2 at baseline), Afib on eliquis, DM, HTN, [place], recently COVID-19+ on 7/10 s/p Dexamethasone and Remdesivir, presenting for AMS with worsening confusion and decreased responsiveness, found to be in septic shock secondary to acute metabolic acidosis vs aspiration PNA. Palliative care consulted for complex decision making regarding goc in setting of advanced illness. 
A/P: 89 yo M with dementia, afib, DM2, HTN, with COVID s/p dex course. Endocrine consulted for DM2 management.    #Type 2 Diabetes Mellitus c/b steroid induced hyperglycemia  - last dex 7/19 - dex effect will be waning off and sugars will improve going forward  - a1c 6.1%, home regimen glipizide 10 mg  - Recommend reducing lantus 10 units qhs. If sugars <140s can reduce to 8 units   - Recommend low dose admelog correction scale TIDQAC and QHS  - Please check FSG before meals and QHS, or q6h while NPO  - inpt glucose goals 140-180  - carb consistent diet  - hypo protocol  - d/c planning - if no further steroids, would discharge off glipizide. Glucoses likely tightly controlled at home prior to covid/dex. Outpatient can consider alternative/non sulfonylurea agent such as DPP4i (tradjenta, januvia, alogliptin). If going to rehab, can discharge on just low dose correction scale with meals/before bed if no steroids    #Hypertension  - goal BP <130/80  - Management as per primary team    #Hyperlipidemia  - check fasting lipid panel as outpt  - continue simvastatin 20 mg    insulin order updated  discussed with team    endocrinology to sign off. please call back if any questions    Avis Feliciano MD  Attending Physician   Department of Endocrinology, Diabetes and Metabolism   Pager: [phone]    If before 9AM or after 5PM, or on weekends/holidays, please call the Endocrine answering service for assistance ([phone]).  For nonurgent matters, please email [email or web address] for assistance.

## 2022-07-26 NOTE — PROGRESS NOTE ADULT - PROBLEM SELECTOR PLAN 4
-FS q6h and SSI  - has been hyperglycemic since receiving steroid  -A1c 6.1%  on lantus 10u hs and ISS -FS q6h and SSI  - has been hyperglycemic since receiving steroid, dc steroid as no clear evidence of adrenal insufficiency  -A1c 6.1%  on lantus 10u hs and ISS

## 2022-07-26 NOTE — SWALLOW BEDSIDE ASSESSMENT ADULT - COMMENTS
Per MICU note 7/24 "89M PMHx dementia (AAO x 2 at baseline), Afib on eliquis, DM, HTN, [place], recently COVID-19+ on 7/10 s/p Dexamethasone and Remdesivir, presenting for AMS with worsening confusion and decreased responsiveness, found to be in septic shock secondary to acute metabolic acidosis vs aspiration PNA".    WBC is elevated. CXR 7/22 "No active pulmonary disease."    Patient seen for clinical swallow evaluation on 7/18 and 7/21, with most recent recommendations for puree with mildly thick liquids (see report for details).     Patient seen at bedside in the MICU, awake/alert, for clinical swallow re-assessment this PM. Patient's son present at bedside who provided [place] translation. However, Patient noted with confusion, and difficulty following simple directions and/or answering questions. SLP and RN provided gentle oral care prior to PO trials. Patient is receiving supplemental O2 via nasal cannula.
Hospitalist note 7/26 "89M PMHx dementia (AAO x 2 at baseline), Afib on eliquis, DM, HTN, [place], recently COVID-19+ on 7/10 s/p Dexamethasone and Remdesivir, presenting for AMS with worsening confusion and decreased responsiveness, found to be in septic shock secondary to acute metabolic acidosis vs aspiration PNA  Neuro  #baseline dementia  - Agitation 2/2 baseline dementia - haldol PRN when QTc is appropriate".     WBC is WFL. CXR 7/22 "No active pulmonary disease."     Patient seen for multiple clinical swallow evaluations on 7/18, 7/21, and 7/24 with most recent recommendations for oral nutrition is contraindicated and consideration for non-oral means of nutrition (see report for details).     Patient seen at bedside, awake/alert, during clinical swallow re-assessment this PM. Patient noted with confusion and difficulty following directions. Patient did not accept PO trials despite max SLP verbal encouragement and prompting.
Per Hospitalist Note 7/21/22: "89M h/o dementia (A&Ox2 at baseline, A-fib (apixaban), DM2, HTN, [place] admitted 7/11 with cough / sneezing / increased confusion after SARS-CoV-2 exposure found to have COVID-19. Course c/b intermittent tachycardia, also with hypernatremia and hyperglycemia in the setting of steroid use."     Patient is familiar to this department as the patient was seen for an initial swallow evaluation on 7/18/22 with recommendation of puree with mildly thick liquids. refer to consult for further details.    Patient received upright in bed, asleep, though arousable via verbal cues. Language Line  utilized throughout the evaluation, ID # [id number]. Patient reluctant to participate in evaluation and required encouragement from SLP and 1:1 PCA.
Per Hospitalist Note 7/17/22: "89M h/o dementia (A&Ox2 at baseline, A-fib (apixaban), DM2, HTN, [place] admitted 7/11 with cough / sneezing / increased confusion after SARS-CoV-2 exposure found to have COVID-19. Course c/b intermittent tachycardia, also with hypernatremia and hyperglycemia in the setting of steroid use."    Patient received upright in bed awake and alert with 1:1 PCA present who assisted in [place] Translation. Patient requiring cues for redirection to task as patient noted with confusion.

## 2022-07-27 LAB
ANION GAP SERPL CALC-SCNC: 7 MMOL/L — SIGNIFICANT CHANGE UP (ref 7–14)
ANION GAP SERPL CALC-SCNC: 9 MMOL/L — SIGNIFICANT CHANGE UP (ref 7–14)
BUN SERPL-MCNC: 35 MG/DL — HIGH (ref 7–23)
BUN SERPL-MCNC: 42 MG/DL — HIGH (ref 7–23)
CALCIUM SERPL-MCNC: 7.4 MG/DL — LOW (ref 8.4–10.5)
CALCIUM SERPL-MCNC: 7.9 MG/DL — LOW (ref 8.4–10.5)
CHLORIDE SERPL-SCNC: 107 MMOL/L — SIGNIFICANT CHANGE UP (ref 98–107)
CHLORIDE SERPL-SCNC: 107 MMOL/L — SIGNIFICANT CHANGE UP (ref 98–107)
CO2 SERPL-SCNC: 33 MMOL/L — HIGH (ref 22–31)
CO2 SERPL-SCNC: 34 MMOL/L — HIGH (ref 22–31)
CORTIS AM PEAK SERPL-MCNC: 18.3 UG/DL — SIGNIFICANT CHANGE UP (ref 6–18.4)
CREAT SERPL-MCNC: 1.15 MG/DL — SIGNIFICANT CHANGE UP (ref 0.5–1.3)
CREAT SERPL-MCNC: 1.15 MG/DL — SIGNIFICANT CHANGE UP (ref 0.5–1.3)
CULTURE RESULTS: SIGNIFICANT CHANGE UP
CULTURE RESULTS: SIGNIFICANT CHANGE UP
EGFR: 61 ML/MIN/1.73M2 — SIGNIFICANT CHANGE UP
EGFR: 61 ML/MIN/1.73M2 — SIGNIFICANT CHANGE UP
GLUCOSE SERPL-MCNC: 106 MG/DL — HIGH (ref 70–99)
GLUCOSE SERPL-MCNC: 133 MG/DL — HIGH (ref 70–99)
HCT VFR BLD CALC: 27.1 % — LOW (ref 39–50)
HGB BLD-MCNC: 8.3 G/DL — LOW (ref 13–17)
MAGNESIUM SERPL-MCNC: 1.9 MG/DL — SIGNIFICANT CHANGE UP (ref 1.6–2.6)
MAGNESIUM SERPL-MCNC: 1.9 MG/DL — SIGNIFICANT CHANGE UP (ref 1.6–2.6)
MCHC RBC-ENTMCNC: 30.6 GM/DL — LOW (ref 32–36)
MCHC RBC-ENTMCNC: 31.8 PG — SIGNIFICANT CHANGE UP (ref 27–34)
MCV RBC AUTO: 103.8 FL — HIGH (ref 80–100)
NRBC # BLD: 0 /100 WBCS — SIGNIFICANT CHANGE UP
NRBC # FLD: 0 K/UL — SIGNIFICANT CHANGE UP
PHOSPHATE SERPL-MCNC: 2 MG/DL — LOW (ref 2.5–4.5)
PHOSPHATE SERPL-MCNC: 2.6 MG/DL — SIGNIFICANT CHANGE UP (ref 2.5–4.5)
PLATELET # BLD AUTO: 100 K/UL — LOW (ref 150–400)
POTASSIUM SERPL-MCNC: 2.9 MMOL/L — CRITICAL LOW (ref 3.5–5.3)
POTASSIUM SERPL-MCNC: 3.9 MMOL/L — SIGNIFICANT CHANGE UP (ref 3.5–5.3)
POTASSIUM SERPL-SCNC: 2.9 MMOL/L — CRITICAL LOW (ref 3.5–5.3)
POTASSIUM SERPL-SCNC: 3.9 MMOL/L — SIGNIFICANT CHANGE UP (ref 3.5–5.3)
RBC # BLD: 2.61 M/UL — LOW (ref 4.2–5.8)
RBC # FLD: 15 % — HIGH (ref 10.3–14.5)
SODIUM SERPL-SCNC: 148 MMOL/L — HIGH (ref 135–145)
SODIUM SERPL-SCNC: 149 MMOL/L — HIGH (ref 135–145)
SPECIMEN SOURCE: SIGNIFICANT CHANGE UP
SPECIMEN SOURCE: SIGNIFICANT CHANGE UP
WBC # BLD: 8.46 K/UL — SIGNIFICANT CHANGE UP (ref 3.8–10.5)
WBC # FLD AUTO: 8.46 K/UL — SIGNIFICANT CHANGE UP (ref 3.8–10.5)

## 2022-07-27 PROCEDURE — 71250 CT THORAX DX C-: CPT | Mod: 26

## 2022-07-27 PROCEDURE — 99233 SBSQ HOSP IP/OBS HIGH 50: CPT

## 2022-07-27 RX ORDER — POTASSIUM CHLORIDE 20 MEQ
40 PACKET (EA) ORAL ONCE
Refills: 0 | Status: COMPLETED | OUTPATIENT
Start: 2022-07-27 | End: 2022-07-27

## 2022-07-27 RX ORDER — POTASSIUM CHLORIDE 20 MEQ
10 PACKET (EA) ORAL
Refills: 0 | Status: COMPLETED | OUTPATIENT
Start: 2022-07-27 | End: 2022-07-27

## 2022-07-27 RX ORDER — CALCIUM GLUCONATE 100 MG/ML
1 VIAL (ML) INTRAVENOUS ONCE
Refills: 0 | Status: COMPLETED | OUTPATIENT
Start: 2022-07-27 | End: 2022-07-27

## 2022-07-27 RX ORDER — SODIUM,POTASSIUM PHOSPHATES 278-250MG
1 POWDER IN PACKET (EA) ORAL ONCE
Refills: 0 | Status: COMPLETED | OUTPATIENT
Start: 2022-07-27 | End: 2022-07-27

## 2022-07-27 RX ADMIN — HEPARIN SODIUM 5000 UNIT(S): 5000 INJECTION INTRAVENOUS; SUBCUTANEOUS at 06:55

## 2022-07-27 RX ADMIN — Medication 100 GRAM(S): at 12:30

## 2022-07-27 RX ADMIN — SIMVASTATIN 20 MILLIGRAM(S): 20 TABLET, FILM COATED ORAL at 23:35

## 2022-07-27 RX ADMIN — Medication 100 MILLIEQUIVALENT(S): at 14:22

## 2022-07-27 RX ADMIN — HEPARIN SODIUM 5000 UNIT(S): 5000 INJECTION INTRAVENOUS; SUBCUTANEOUS at 18:23

## 2022-07-27 RX ADMIN — Medication 100 MILLIEQUIVALENT(S): at 11:31

## 2022-07-27 RX ADMIN — Medication 105 MILLIGRAM(S): at 18:22

## 2022-07-27 RX ADMIN — Medication 3 MILLIGRAM(S): at 23:35

## 2022-07-27 RX ADMIN — PIPERACILLIN AND TAZOBACTAM 25 GRAM(S): 4; .5 INJECTION, POWDER, LYOPHILIZED, FOR SOLUTION INTRAVENOUS at 22:05

## 2022-07-27 RX ADMIN — PANTOPRAZOLE SODIUM 40 MILLIGRAM(S): 20 TABLET, DELAYED RELEASE ORAL at 06:55

## 2022-07-27 RX ADMIN — PIPERACILLIN AND TAZOBACTAM 25 GRAM(S): 4; .5 INJECTION, POWDER, LYOPHILIZED, FOR SOLUTION INTRAVENOUS at 07:19

## 2022-07-27 RX ADMIN — SODIUM CHLORIDE 75 MILLILITER(S): 9 INJECTION, SOLUTION INTRAVENOUS at 22:06

## 2022-07-27 RX ADMIN — Medication 2: at 08:13

## 2022-07-27 RX ADMIN — Medication 2: at 00:05

## 2022-07-27 RX ADMIN — DONEPEZIL HYDROCHLORIDE 5 MILLIGRAM(S): 10 TABLET, FILM COATED ORAL at 05:18

## 2022-07-27 RX ADMIN — INSULIN GLARGINE 10 UNIT(S): 100 INJECTION, SOLUTION SUBCUTANEOUS at 23:36

## 2022-07-27 RX ADMIN — DONEPEZIL HYDROCHLORIDE 5 MILLIGRAM(S): 10 TABLET, FILM COATED ORAL at 23:35

## 2022-07-27 RX ADMIN — Medication 100 MILLIEQUIVALENT(S): at 17:20

## 2022-07-27 NOTE — PROVIDER CONTACT NOTE (CRITICAL VALUE NOTIFICATION) - SITUATION
The patient of [address] A has H/O of DM and Altered mental status and dementia. admitted on 07/11/2022.
lactate 3.4, drawn from VBG
VBG gucose 456
bmp glucose 514
INR: 5.45

## 2022-07-27 NOTE — PROGRESS NOTE ADULT - SUBJECTIVE AND OBJECTIVE BOX
Dr. Arlette Kwok  Pager [phone]    PROGRESS NOTE:     Patient is a 89y old  Male who presents with a chief complaint of Increasing confusion, cold like symptoms, covid exposure (26 Jul 2022 12:45)      SUBJECTIVE / OVERNIGHT EVENTS: confused, demented  ADDITIONAL REVIEW OF SYSTEMS: afebrile, on 2L NC    MEDICATIONS  (STANDING):  ascorbic acid 500 milliGRAM(s) Oral daily  calcium gluconate IVPB 1 Gram(s) IV Intermittent once  dextrose 5%. 1000 milliLiter(s) (75 mL/Hr) IV Continuous <Continuous>  dextrose 5%. 1000 milliLiter(s) (50 mL/Hr) IV Continuous <Continuous>  dextrose 5%. 1000 milliLiter(s) (100 mL/Hr) IV Continuous <Continuous>  dextrose 5%. 1000 milliLiter(s) (100 mL/Hr) IV Continuous <Continuous>  dextrose 5%. 1000 milliLiter(s) (50 mL/Hr) IV Continuous <Continuous>  dextrose 50% Injectable 25 Gram(s) IV Push once  dextrose 50% Injectable 12.5 Gram(s) IV Push once  dextrose 50% Injectable 25 Gram(s) IV Push once  dextrose 50% Injectable 12.5 Gram(s) IV Push once  dextrose 50% Injectable 25 Gram(s) IV Push once  dextrose 50% Injectable 25 Gram(s) IV Push once  donepezil 5 milliGRAM(s) Oral at bedtime  glucagon  Injectable 1 milliGRAM(s) IntraMuscular once  glucagon  Injectable 1 milliGRAM(s) IntraMuscular once  heparin   Injectable 5000 Unit(s) SubCutaneous every 12 hours  insulin glargine Injectable (LANTUS) 10 Unit(s) SubCutaneous at bedtime  insulin lispro (ADMELOG) corrective regimen sliding scale   SubCutaneous every 6 hours  melatonin 3 milliGRAM(s) Oral at bedtime  multivitamin 1 Tablet(s) Oral daily  pantoprazole    Tablet 40 milliGRAM(s) Oral before breakfast  piperacillin/tazobactam IVPB.. 3.375 Gram(s) IV Intermittent every 12 hours  potassium chloride   Powder 40 milliEquivalent(s) Oral once  potassium chloride   Powder 40 milliEquivalent(s) Oral once  potassium chloride  10 mEq/100 mL IVPB 10 milliEquivalent(s) IV Intermittent every 1 hour  potassium phosphate / sodium phosphate Powder (PHOS-NaK) 1 Packet(s) Oral once  QUEtiapine 12.5 milliGRAM(s) Oral at bedtime  simvastatin 20 milliGRAM(s) Oral at bedtime  thiamine IVPB 500 milliGRAM(s) IV Intermittent daily    MEDICATIONS  (PRN):  acetaminophen     Tablet .. 650 milliGRAM(s) Oral every 4 hours PRN Temp greater or equal to 38C (100.4F), Severe Pain (7 - 10)  ALBUTerol    90 MICROgram(s) HFA Inhaler 2 Puff(s) Inhalation every 6 hours PRN Bronchospasm  dextrose Oral Gel 15 Gram(s) Oral once PRN Blood Glucose LESS THAN 70 milliGRAM(s)/deciliter  dextrose Oral Gel 15 Gram(s) Oral once PRN Blood Glucose LESS THAN 70 milliGRAM(s)/deciliter  guaiFENesin Oral Liquid (Sugar-Free) 100 milliGRAM(s) Oral every 6 hours PRN Cough  metoprolol tartrate Injectable 5 milliGRAM(s) IV Push every 6 hours PRN sustained HR over 140 bpm      CAPILLARY BLOOD GLUCOSE      POCT Blood Glucose.: 128 mg/dL (27 Jul 2022 11:42)  POCT Blood Glucose.: 163 mg/dL (27 Jul 2022 08:09)  POCT Blood Glucose.: 199 mg/dL (26 Jul 2022 23:43)  POCT Blood Glucose.: 208 mg/dL (26 Jul 2022 22:55)  POCT Blood Glucose.: 282 mg/dL (26 Jul 2022 18:32)    I&O's Summary    26 Jul 2022 07:01  -  27 Jul 2022 07:00  --------------------------------------------------------  IN: 1250 mL / OUT: 110 mL / NET: 1140 mL        PHYSICAL EXAM:  Vital Signs Last 24 Hrs  T(C): 37 (27 Jul 2022 06:45), Max: 37 (27 Jul 2022 06:45)  T(F): 98.6 (27 Jul 2022 06:45), Max: 98.6 (27 Jul 2022 06:45)  HR: 86 (27 Jul 2022 06:45) (86 - 88)  BP: 130/66 (27 Jul 2022 06:45) (130/66 - 144/77)  BP(mean): --  RR: 17 (27 Jul 2022 06:45) (17 - 18)  SpO2: 93% (27 Jul 2022 06:45) (93% - 99%)    Parameters below as of 27 Jul 2022 06:45  Patient On (Oxygen Delivery Method): room air      General: NAD, cachectic. not following commands, hard of hearing  HEENT: MMM, poor dentition  Neck: trachea midline   Respiratory: CTA b/l. No rales, rhonchi, wheezing appreciated.  Cardiovascular: RRR. +s1/s2, -s3/s4. No murmur, rubs, gallops. No edema  Abdomen: NT/ND. +BS, No HSM.   Extremities: 2+ pulses  Skin: edematous. No rashes, ecchymoses, or petechiae noted  Neurological: a/0x1 self, incoherent, confused  LABS:                        8.3    8.46  )-----------( 100      ( 27 Jul 2022 06:10 )             27.1     07-27    149<H>  |  107  |  42<H>  ----------------------------<  133<H>  2.9<LL>   |  33<H>  |  1.15    Ca    7.4<L>      27 Jul 2022 06:10  Phos  2.0     07-27  Mg     1.90     07-27                  RADIOLOGY & ADDITIONAL TESTS:  Results Reviewed:   Imaging Personally Reviewed:    Electrocardiogram Personally Reviewed:    COORDINATION OF CARE:  Care Discussed with Consultants/Other Providers [Y/N]: MICK Becerra dc plan to LTC facility, expedite ct chest  Prior or Outpatient Records Reviewed [Y/N]:

## 2022-07-27 NOTE — PROVIDER CONTACT NOTE (CRITICAL VALUE NOTIFICATION) - RECOMMENDATIONS
For supplementation of K+
As per PA, continue to monitor pt. at this time. Interventions to follow. Will endorse to oncoming nurse to continue to monitor.
PA made aware, no new orders at this time

## 2022-07-27 NOTE — PROGRESS NOTE ADULT - PROBLEM SELECTOR PLAN 1
DDx aspiration PNA  -on NC 2L, recent covid infection 7/10 s/p rem/dex  -CXR clear  -per ICU team, possible aspiration PNA given consolidation over right lung base, on zosyn empirically  -expedite CT chest  -diet advanced to puree diet, aspiration risk, pleasure feeds per family  -overall prognosis poor with multiple comorbidities, seen by palliative care, family okay with pleasure feeds and dc plan to LTC placement with possible hospice   No plan for NGT or tube feed   -dispo: DC plan LTC facility transition to hospice DDx aspiration PNA  -on NC 2L, recent covid infection 7/10 s/p rem/dex  -CXR clear  -per ICU team, possible aspiration PNA given consolidation over right lung base, on zosyn empirically  -expedite CT chest  -diet advanced to puree diet, aspiration risk, pleasure feeds per family  -overall prognosis poor with multiple comorbidities, seen by palliative care, family okay with pleasure feeds and dc plan to LTC placement with possible hospice   No plan for NGT or tube feed   -dispo: DC plan inpt rehab vs. LTC facility with transition to hospice. Updated son in law Zeinab on 7/27

## 2022-07-27 NOTE — PROGRESS NOTE ADULT - PROBLEM SELECTOR PLAN 5
Hemodynamically stable, off pressors  d/c stress steroid, am cortisol 18.3 (no adrenal insufficiency)  monitor bp

## 2022-07-27 NOTE — PROVIDER CONTACT NOTE (CRITICAL VALUE NOTIFICATION) - ASSESSMENT
patient asymptomatic
Vitals : Stable except Sao2 is fluctuating from 90%-93%. In total assist. IncontinenetX2. And ON DNR and DNI.
On assessment, pt. is asymptomatic. Observed sleeping in bed, awakens to voice. Unable to answer questions at baseline, alters between periods of restlessness and brief periods of sleep. No signs of bleeding observed at this time.
lactate 3.4, drawn from VBG

## 2022-07-27 NOTE — PROVIDER CONTACT NOTE (CRITICAL VALUE NOTIFICATION) - BACKGROUND
Admitting dx: AMS/ COVID-19. PMH: Dementia, [place], HTN, DM2, afib. Currently NPO, pending S&S.
Pt bis A&O X1or 0. Confused.
Patient admitted for altered mental status. PMH DM, afib, HTN.
Pt admitted for altered mental status, found to be covid positive.,

## 2022-07-27 NOTE — PROVIDER CONTACT NOTE (CRITICAL VALUE NOTIFICATION) - ACTION/TREATMENT ORDERED:
give sliding scale and reassess fingersticks per provider
As per PA, continue to monitor pt. at this time. Interventions to follow. Will endorse to oncoming nurse to continue to monitor.
insulin lispro corrective regimen sliding scale q 4 hr
PA made aware, no new orders at this time
K= supplement may be needed.

## 2022-07-27 NOTE — PROGRESS NOTE ADULT - PROBLEM SELECTOR PLAN 6
TENZIN with hypernatremia, creat downtrending  hypokalemia/hypernatremia, k supplementation, d5w, repeat BMP this afternoon

## 2022-07-27 NOTE — PROGRESS NOTE ADULT - PROBLEM SELECTOR PLAN 2
# acute metabolic encephalopathy superimposed on baseline dementia  - Agitation 2/2 baseline dementia - haldol PRN when QTc is appropriate (<500ms)  - trial of iv thiamine as he appears malnourished  -avoid benzo/sedatives, frequent reorientation

## 2022-07-27 NOTE — PROGRESS NOTE ADULT - ASSESSMENT
89M PMHx dementia (AAO x 2 at baseline), Afib on eliquis, DM, HTN, [place], recently COVID-19+ on 7/10 s/p Dexamethasone and Remdesivir, presenting for AMS with worsening confusion and decreased responsiveness, found to be in septic shock secondary to acute metabolic acidosis vs aspiration PNA    Neuro  #baseline dementia  - Agitation 2/2 baseline dementia - haldol PRN when QTc is appropriate    Pulm  #DDx aspiration PNA  -on NC  -CXR clear  -possible aspiration PNA given consolidation over right lung base, will cover broadly with Zosyn    Cardiac  - No longer requiring pressors      GI  - NPO given possible aspiration   - Discussed NG tube vs. pleasure feeds as patient failed speech/swallow eval with son-in-law, will hold off at this time    Renal  #TENZIN, prerenal  #severe metabolic acidosis  -trend CMP    Endo  #DM  -FS q6h and SSI  - has been hyperglycemic since receiving steroid    ID  #aspiration pna  on zosyn    Heme/Onc  -held OAc pending imaging    PPx  -SCD for DVT    GOC  -D/w family, plan to pursue trial of pressor support and abx, forgoing HD/RRT

## 2022-07-27 NOTE — PROGRESS NOTE ADULT - PROBLEM SELECTOR PLAN 4
-FS q6h and SSI  - has been hyperglycemic since receiving steroid, dc steroid as no clear evidence of adrenal insufficiency  -A1c 6.1%  on lantus 10u hs and ISS

## 2022-07-28 LAB
ANION GAP SERPL CALC-SCNC: 9 MMOL/L — SIGNIFICANT CHANGE UP (ref 7–14)
BUN SERPL-MCNC: 32 MG/DL — HIGH (ref 7–23)
CALCIUM SERPL-MCNC: 7.4 MG/DL — LOW (ref 8.4–10.5)
CHLORIDE SERPL-SCNC: 105 MMOL/L — SIGNIFICANT CHANGE UP (ref 98–107)
CO2 SERPL-SCNC: 29 MMOL/L — SIGNIFICANT CHANGE UP (ref 22–31)
CREAT SERPL-MCNC: 0.99 MG/DL — SIGNIFICANT CHANGE UP (ref 0.5–1.3)
CULTURE RESULTS: SIGNIFICANT CHANGE UP
EGFR: 73 ML/MIN/1.73M2 — SIGNIFICANT CHANGE UP
GLUCOSE SERPL-MCNC: 110 MG/DL — HIGH (ref 70–99)
HCT VFR BLD CALC: 30 % — LOW (ref 39–50)
HGB BLD-MCNC: 9.4 G/DL — LOW (ref 13–17)
MAGNESIUM SERPL-MCNC: 1.8 MG/DL — SIGNIFICANT CHANGE UP (ref 1.6–2.6)
MCHC RBC-ENTMCNC: 31.3 GM/DL — LOW (ref 32–36)
MCHC RBC-ENTMCNC: 32.4 PG — SIGNIFICANT CHANGE UP (ref 27–34)
MCV RBC AUTO: 103.4 FL — HIGH (ref 80–100)
NRBC # BLD: 0 /100 WBCS — SIGNIFICANT CHANGE UP
NRBC # FLD: 0 K/UL — SIGNIFICANT CHANGE UP
PHOSPHATE SERPL-MCNC: 2.5 MG/DL — SIGNIFICANT CHANGE UP (ref 2.5–4.5)
PLATELET # BLD AUTO: 85 K/UL — LOW (ref 150–400)
POTASSIUM SERPL-MCNC: 3.3 MMOL/L — LOW (ref 3.5–5.3)
POTASSIUM SERPL-SCNC: 3.3 MMOL/L — LOW (ref 3.5–5.3)
RBC # BLD: 2.9 M/UL — LOW (ref 4.2–5.8)
RBC # FLD: 14.7 % — HIGH (ref 10.3–14.5)
SODIUM SERPL-SCNC: 143 MMOL/L — SIGNIFICANT CHANGE UP (ref 135–145)
SPECIMEN SOURCE: SIGNIFICANT CHANGE UP
WBC # BLD: 7.92 K/UL — SIGNIFICANT CHANGE UP (ref 3.8–10.5)
WBC # FLD AUTO: 7.92 K/UL — SIGNIFICANT CHANGE UP (ref 3.8–10.5)

## 2022-07-28 PROCEDURE — 99233 SBSQ HOSP IP/OBS HIGH 50: CPT

## 2022-07-28 RX ORDER — POTASSIUM CHLORIDE 20 MEQ
10 PACKET (EA) ORAL
Refills: 0 | Status: COMPLETED | OUTPATIENT
Start: 2022-07-28 | End: 2022-07-28

## 2022-07-28 RX ORDER — POTASSIUM CHLORIDE 20 MEQ
40 PACKET (EA) ORAL EVERY 4 HOURS
Refills: 0 | Status: DISCONTINUED | OUTPATIENT
Start: 2022-07-28 | End: 2022-07-28

## 2022-07-28 RX ORDER — PANTOPRAZOLE SODIUM 20 MG/1
40 TABLET, DELAYED RELEASE ORAL
Refills: 0 | Status: DISCONTINUED | OUTPATIENT
Start: 2022-07-28 | End: 2022-08-08

## 2022-07-28 RX ORDER — PIPERACILLIN AND TAZOBACTAM 4; .5 G/20ML; G/20ML
3.38 INJECTION, POWDER, LYOPHILIZED, FOR SOLUTION INTRAVENOUS EVERY 8 HOURS
Refills: 0 | Status: COMPLETED | OUTPATIENT
Start: 2022-07-28 | End: 2022-07-31

## 2022-07-28 RX ORDER — INSULIN LISPRO 100/ML
VIAL (ML) SUBCUTANEOUS
Refills: 0 | Status: DISCONTINUED | OUTPATIENT
Start: 2022-07-28 | End: 2022-07-30

## 2022-07-28 RX ORDER — INSULIN LISPRO 100/ML
VIAL (ML) SUBCUTANEOUS AT BEDTIME
Refills: 0 | Status: DISCONTINUED | OUTPATIENT
Start: 2022-07-28 | End: 2022-07-30

## 2022-07-28 RX ADMIN — Medication 1 TABLET(S): at 11:58

## 2022-07-28 RX ADMIN — Medication 500 MILLIGRAM(S): at 11:58

## 2022-07-28 RX ADMIN — INSULIN GLARGINE 10 UNIT(S): 100 INJECTION, SOLUTION SUBCUTANEOUS at 22:11

## 2022-07-28 RX ADMIN — PANTOPRAZOLE SODIUM 40 MILLIGRAM(S): 20 TABLET, DELAYED RELEASE ORAL at 07:48

## 2022-07-28 RX ADMIN — Medication 40 MILLIEQUIVALENT(S): at 11:56

## 2022-07-28 RX ADMIN — SIMVASTATIN 20 MILLIGRAM(S): 20 TABLET, FILM COATED ORAL at 22:16

## 2022-07-28 RX ADMIN — HEPARIN SODIUM 5000 UNIT(S): 5000 INJECTION INTRAVENOUS; SUBCUTANEOUS at 07:48

## 2022-07-28 RX ADMIN — Medication 100 MILLIEQUIVALENT(S): at 17:24

## 2022-07-28 RX ADMIN — PIPERACILLIN AND TAZOBACTAM 25 GRAM(S): 4; .5 INJECTION, POWDER, LYOPHILIZED, FOR SOLUTION INTRAVENOUS at 22:11

## 2022-07-28 RX ADMIN — PIPERACILLIN AND TAZOBACTAM 25 GRAM(S): 4; .5 INJECTION, POWDER, LYOPHILIZED, FOR SOLUTION INTRAVENOUS at 13:54

## 2022-07-28 RX ADMIN — DONEPEZIL HYDROCHLORIDE 5 MILLIGRAM(S): 10 TABLET, FILM COATED ORAL at 22:16

## 2022-07-28 RX ADMIN — Medication 100 MILLIEQUIVALENT(S): at 13:54

## 2022-07-28 RX ADMIN — Medication 105 MILLIGRAM(S): at 11:56

## 2022-07-28 RX ADMIN — HEPARIN SODIUM 5000 UNIT(S): 5000 INJECTION INTRAVENOUS; SUBCUTANEOUS at 17:24

## 2022-07-28 RX ADMIN — Medication 3 MILLIGRAM(S): at 22:15

## 2022-07-28 RX ADMIN — Medication 100 MILLIEQUIVALENT(S): at 15:23

## 2022-07-28 NOTE — ADVANCED PRACTICE NURSE CONSULT - REASON FOR CONSULT
Patient seen on skin care rounds after wound care referral received for assessment of skin impairment and recommendations of topical management. Patient is a 89M PMHx dementia), Afib on eliquis, DM, HTN, hard of hearing who presents with cough, sneezing, and increased confusion at home after Covid exposure. Chart reviewed: COVID + 7/10, WBC 7.92, D Dimer 180 , hA1c 6.1.

## 2022-07-28 NOTE — ADVANCED PRACTICE NURSE CONSULT - RECOMMEDATIONS
Topical recommendations:     Bilateral ears - offload nasal cannula with posey trach tie.    Left buttock - Cleanse with NS, pat dry. Apply Liquid barrier film to periwound skin (allow to dry). Apply small silicone foam with border. Change daily or PRN if soiled.     Continue low air loss bed therapy, continue heel elevation, continue to turn & reposition per protocol, soft pillow between bony prominences, continue moisture management with barrier creams & single breathable pad, continue measures to decrease friction/shear/pressure. Continue with nutritional support as per dietary/orders.     Plan of care discussed with primary RN Reina.     Please contact Wound/Ostomy Care Service Line if we can be of further assistance (ext [phone]).  Topical recommendations:     Bilateral ears - offload nasal cannula with posey trach tie.    Left buttock - Cleanse with NS, pat dry. Apply Liquid barrier film to periwound skin (allow to dry). Apply small silicone foam with border. Change daily or PRN if soiled.     Continue low air loss bed therapy, continue heel elevation, continue to turn & reposition per protocol, soft pillow between bony prominences, continue moisture management with barrier creams & single breathable pad, continue measures to decrease friction/shear/pressure. Continue with nutritional support as per dietary/orders.     Plan of care discussed with primary RN Melody.     Please contact Wound/Ostomy Care Service Line if we can be of further assistance (ext [phone]).

## 2022-07-28 NOTE — PROGRESS NOTE ADULT - ASSESSMENT
89M PMHx dementia (AAO x 2 at baseline), Afib on eliquis, DM, HTN, [place], recently COVID-19+ on 7/10 s/p Dexamethasone and Remdesivir, presenting for AMS with worsening confusion and decreased responsiveness, found to be in septic shock secondary to acute metabolic acidosis vs aspiration PNA    Neuro  #baseline dementia  - Agitation 2/2 baseline dementia - haldol PRN when QTc is appropriate    Pulm  #DDx aspiration PNA  -on NC  -CXR clear  -possible aspiration PNA given consolidation over right lung base, will cover broadly with Zosyn    Cardiac  - No longer requiring pressors      GI  - NPO given possible aspiration   - Discussed NG tube vs. pleasure feeds as patient failed speech/swallow eval with son-in-law, will hold off at this time    Renal  #TENZIN, prerenal  #severe metabolic acidosis  -trend CMP    Endo  #DM  -FS q6h and SSI  - has been hyperglycemic since receiving steroid    ID  #aspiration pna  on zosyn    Heme/Onc  -held OAc pending imaging    PPx  -SCD for DVT    GOC  -D/w family, plan to pursue trial of pressor support and abx, forgoing HD/RRT     89M PMHx dementia (AAO x 2 at baseline), Afib on eliquis, DM, HTN, [place], recently COVID-19+ on 7/10 s/p Dexamethasone and Remdesivir, presenting for AMS with worsening confusion and decreased responsiveness, found to be in septic shock secondary to aspiration pnuemonia

## 2022-07-28 NOTE — ADVANCED PRACTICE NURSE CONSULT - ASSESSMENT
General: Opens eyes spontaneously, bedbound, incontinent of urine and stool. Nasal cannula in place. Skin warm, dry with increased moisture in intertriginous folds, adequate skin turgor, scattered areas of ecchymosis (without hematoma) on bilateral upper extremities.     Left buttock - stage 2 pressure injury complicated by friction and shear. Patient assessed while lying on right side, loss of epidermis over bony prominence measuring [id number], wound bed is red moist agranular tissue with skin slippage. Scant serosanguinous drainage. Periwound with blanchable erythema, no increased warmth, no edema, no induration, no signs or symptoms of overt skin infection noted. Goals of care: offload pressure, friction, and shear, protect from excess moisture.

## 2022-07-28 NOTE — PROGRESS NOTE ADULT - PROBLEM SELECTOR PLAN 2
# acute metabolic encephalopathy superimposed on baseline dementia, very hard of hearing  - Agitation 2/2 baseline dementia - haldol PRN when QTc is appropriate (<500ms)  - trial of iv thiamine as he appears malnourished  -avoid benzo/sedatives, frequent reorientation

## 2022-07-28 NOTE — PROGRESS NOTE ADULT - SUBJECTIVE AND OBJECTIVE BOX
Dr. Arlette Kwok  Pager [phone]    PROGRESS NOTE:     Patient is a 89y old  Male who presents with a chief complaint of Increasing confusion, cold like symptoms, covid exposure (27 Jul 2022 12:30)      SUBJECTIVE / OVERNIGHT EVENTS: pt is very hard of hearing, confused  ADDITIONAL REVIEW OF SYSTEMS: afebrile     MEDICATIONS  (STANDING):  ascorbic acid 500 milliGRAM(s) Oral daily  dextrose 5%. 1000 milliLiter(s) (100 mL/Hr) IV Continuous <Continuous>  dextrose 5%. 1000 milliLiter(s) (50 mL/Hr) IV Continuous <Continuous>  dextrose 5%. 1000 milliLiter(s) (75 mL/Hr) IV Continuous <Continuous>  dextrose 5%. 1000 milliLiter(s) (50 mL/Hr) IV Continuous <Continuous>  dextrose 5%. 1000 milliLiter(s) (100 mL/Hr) IV Continuous <Continuous>  dextrose 50% Injectable 25 Gram(s) IV Push once  dextrose 50% Injectable 12.5 Gram(s) IV Push once  dextrose 50% Injectable 25 Gram(s) IV Push once  dextrose 50% Injectable 25 Gram(s) IV Push once  dextrose 50% Injectable 12.5 Gram(s) IV Push once  dextrose 50% Injectable 25 Gram(s) IV Push once  donepezil 5 milliGRAM(s) Oral at bedtime  glucagon  Injectable 1 milliGRAM(s) IntraMuscular once  glucagon  Injectable 1 milliGRAM(s) IntraMuscular once  heparin   Injectable 5000 Unit(s) SubCutaneous every 12 hours  insulin glargine Injectable (LANTUS) 10 Unit(s) SubCutaneous at bedtime  insulin lispro (ADMELOG) corrective regimen sliding scale   SubCutaneous every 6 hours  melatonin 3 milliGRAM(s) Oral at bedtime  multivitamin 1 Tablet(s) Oral daily  pantoprazole   Suspension 40 milliGRAM(s) Oral before breakfast  piperacillin/tazobactam IVPB.. 3.375 Gram(s) IV Intermittent every 8 hours  potassium chloride  10 mEq/100 mL IVPB 10 milliEquivalent(s) IV Intermittent every 1 hour  simvastatin 20 milliGRAM(s) Oral at bedtime  thiamine IVPB 500 milliGRAM(s) IV Intermittent daily    MEDICATIONS  (PRN):  acetaminophen     Tablet .. 650 milliGRAM(s) Oral every 4 hours PRN Temp greater or equal to 38C (100.4F), Severe Pain (7 - 10)  ALBUTerol    90 MICROgram(s) HFA Inhaler 2 Puff(s) Inhalation every 6 hours PRN Bronchospasm  dextrose Oral Gel 15 Gram(s) Oral once PRN Blood Glucose LESS THAN 70 milliGRAM(s)/deciliter  dextrose Oral Gel 15 Gram(s) Oral once PRN Blood Glucose LESS THAN 70 milliGRAM(s)/deciliter  guaiFENesin Oral Liquid (Sugar-Free) 100 milliGRAM(s) Oral every 6 hours PRN Cough  metoprolol tartrate Injectable 5 milliGRAM(s) IV Push every 6 hours PRN sustained HR over 140 bpm      CAPILLARY BLOOD GLUCOSE      POCT Blood Glucose.: 116 mg/dL (28 Jul 2022 11:55)  POCT Blood Glucose.: 119 mg/dL (28 Jul 2022 06:48)  POCT Blood Glucose.: 114 mg/dL (27 Jul 2022 23:15)  POCT Blood Glucose.: 80 mg/dL (27 Jul 2022 17:06)    I&O's Summary    27 Jul 2022 07:01  -  28 Jul 2022 07:00  --------------------------------------------------------  IN: 570 mL / OUT: 210 mL / NET: 360 mL        PHYSICAL EXAM:  Vital Signs Last 24 Hrs  T(C): 36.7 (28 Jul 2022 07:53), Max: 37.3 (27 Jul 2022 17:19)  T(F): 98 (28 Jul 2022 07:53), Max: 99.2 (27 Jul 2022 17:19)  HR: 80 (28 Jul 2022 07:53) (80 - 94)  BP: 128/58 (28 Jul 2022 07:53) (128/58 - 142/67)  BP(mean): --  RR: 18 (28 Jul 2022 07:53) (18 - 18)  SpO2: 95% (28 Jul 2022 07:53) (95% - 98%)    Parameters below as of 28 Jul 2022 07:53  Patient On (Oxygen Delivery Method): nasal cannula  O2 Flow (L/min): 2    General: NAD, cachectic. not following commands, very hard of hearing  HEENT: MMM, poor dentition  Neck: trachea midline   Respiratory: CTA b/l. No rales, rhonchi, wheezing appreciated.  Cardiovascular: RRR. +s1/s2, -s3/s4. No murmur, rubs, gallops. No edema  Abdomen: NT/ND. +BS, No HSM.   Extremities: 2+ pulses  Skin: edematous. No rashes, ecchymoses, or petechiae noted  Neurological: a/0x1 self, incoherent, confused    LABS:                        9.4    7.92  )-----------( 85       ( 28 Jul 2022 09:15 )             30.0     07-28    143  |  105  |  32<H>  ----------------------------<  110<H>  3.3<L>   |  29  |  0.99    Ca    7.4<L>      28 Jul 2022 09:15  Phos  2.5     07-28  Mg     1.80     07-28        RADIOLOGY & ADDITIONAL TESTS:  Results Reviewed:   Imaging Personally Reviewed:    Care Discussed with Consultants/Other Providers [Y/N]:  ra< from: CT Chest No Cont (07.27.22 @ 15:40) >  LYMPH NODES: Mildly enlarged precarinal node measuring 1.7 x 1.4 cm,   likely reactive. No large axillary nodes.    HEART/VASCULATURE: Cardiomegaly. Mitral annular calcifications. No   pericardial effusion. Normal caliber thoracic aorta.    AIRWAYS/LUNGS/PLEURA: Mild tracheal secretions. Occlusion of the left   lower lobe airways, presumably secondary to mucus plugging/secretions.   Complete left lower lobe opacification with volume loss. Clustered   micronodular/tree-in-bud opacities and patchy consolidative opacities   multifocally and bilaterally. Small bilateral pleural effusions.    UPPER ABDOMEN: Unremarkable.    BONES/SOFT TISSUES: Unremarkable.    IMPRESSION:    Multifocal and bilateral tree-in-bud nodular opacities and patchy   consolidation compatible with aspiration or infection.    Occluded left lower lobe airways presumably secondary to   secretions/mucous plugging and complete left lower lobe collapse.    Small bilateral pleural effusions.    Recommend follow-up CT chest in one month to ensure clearing.      Prior or Outpatient Records Reviewed [Y/N]:  Case d/w ACP [facility], c/w zosyn dose adjusted, dc plan LTC with hospice

## 2022-07-28 NOTE — PROGRESS NOTE ADULT - PROBLEM SELECTOR PLAN 1
c/w zosyn to complete 7-10 day course for Aspiration PNA, dose adjusted per renal fxn  -on NC 2L, recent covid infection 7/10 s/p rem/dex  -CT chest (7/27) Multifocal and bilateral tree-in-bud nodular opacities and patchy   consolidation compatible with aspiration or infection. Occluded LLL airways presumably secondary to secretions/mucous plugging and complete LLL collapse. Small bilateral pleural effusions.  -Chest PT  -Diet advanced to puree diet, pleasure feeds per family, advised family high risk of aspiration  -overall prognosis poor with multiple comorbidities, seen by palliative care, family okay with pleasure feeds and dc plan to LTC placement with possible hospice   No plan for NGT or tube feed   -dispo: DC plan inpt rehab vs. LTC facility with transition to hospice. Updated son in law Zeinab on 7/27 Patient/Caregiver provided printed discharge information.

## 2022-07-29 LAB
ANION GAP SERPL CALC-SCNC: 10 MMOL/L — SIGNIFICANT CHANGE UP (ref 7–14)
BUN SERPL-MCNC: 24 MG/DL — HIGH (ref 7–23)
CALCIUM SERPL-MCNC: 7.2 MG/DL — LOW (ref 8.4–10.5)
CHLORIDE SERPL-SCNC: 99 MMOL/L — SIGNIFICANT CHANGE UP (ref 98–107)
CO2 SERPL-SCNC: 24 MMOL/L — SIGNIFICANT CHANGE UP (ref 22–31)
CREAT SERPL-MCNC: 0.93 MG/DL — SIGNIFICANT CHANGE UP (ref 0.5–1.3)
EGFR: 78 ML/MIN/1.73M2 — SIGNIFICANT CHANGE UP
GLUCOSE SERPL-MCNC: 102 MG/DL — HIGH (ref 70–99)
MAGNESIUM SERPL-MCNC: 1.6 MG/DL — SIGNIFICANT CHANGE UP (ref 1.6–2.6)
PHOSPHATE SERPL-MCNC: 2 MG/DL — LOW (ref 2.5–4.5)
POTASSIUM SERPL-MCNC: 3.4 MMOL/L — LOW (ref 3.5–5.3)
POTASSIUM SERPL-SCNC: 3.4 MMOL/L — LOW (ref 3.5–5.3)
SODIUM SERPL-SCNC: 133 MMOL/L — LOW (ref 135–145)

## 2022-07-29 PROCEDURE — 99497 ADVNCD CARE PLAN 30 MIN: CPT

## 2022-07-29 PROCEDURE — 99233 SBSQ HOSP IP/OBS HIGH 50: CPT

## 2022-07-29 RX ORDER — DEXTROSE MONOHYDRATE, SODIUM CHLORIDE, AND POTASSIUM CHLORIDE 50; .745; 4.5 G/1000ML; G/1000ML; G/1000ML
1000 INJECTION, SOLUTION INTRAVENOUS
Refills: 0 | Status: DISCONTINUED | OUTPATIENT
Start: 2022-07-29 | End: 2022-07-29

## 2022-07-29 RX ORDER — CALCIUM GLUCONATE 100 MG/ML
1 VIAL (ML) INTRAVENOUS ONCE
Refills: 0 | Status: COMPLETED | OUTPATIENT
Start: 2022-07-29 | End: 2022-07-29

## 2022-07-29 RX ORDER — POTASSIUM CHLORIDE 20 MEQ
10 PACKET (EA) ORAL
Refills: 0 | Status: COMPLETED | OUTPATIENT
Start: 2022-07-29 | End: 2022-07-29

## 2022-07-29 RX ORDER — POTASSIUM PHOSPHATE, MONOBASIC POTASSIUM PHOSPHATE, DIBASIC 236; 224 MG/ML; MG/ML
15 INJECTION, SOLUTION INTRAVENOUS ONCE
Refills: 0 | Status: COMPLETED | OUTPATIENT
Start: 2022-07-29 | End: 2022-07-29

## 2022-07-29 RX ADMIN — Medication 100 MILLIEQUIVALENT(S): at 11:43

## 2022-07-29 RX ADMIN — PIPERACILLIN AND TAZOBACTAM 25 GRAM(S): 4; .5 INJECTION, POWDER, LYOPHILIZED, FOR SOLUTION INTRAVENOUS at 21:45

## 2022-07-29 RX ADMIN — Medication 100 MILLIEQUIVALENT(S): at 10:23

## 2022-07-29 RX ADMIN — Medication 100 GRAM(S): at 13:34

## 2022-07-29 RX ADMIN — SIMVASTATIN 20 MILLIGRAM(S): 20 TABLET, FILM COATED ORAL at 21:46

## 2022-07-29 RX ADMIN — Medication 105 MILLIGRAM(S): at 14:32

## 2022-07-29 RX ADMIN — POTASSIUM PHOSPHATE, MONOBASIC POTASSIUM PHOSPHATE, DIBASIC 62.5 MILLIMOLE(S): 236; 224 INJECTION, SOLUTION INTRAVENOUS at 15:49

## 2022-07-29 RX ADMIN — DONEPEZIL HYDROCHLORIDE 5 MILLIGRAM(S): 10 TABLET, FILM COATED ORAL at 21:45

## 2022-07-29 RX ADMIN — PIPERACILLIN AND TAZOBACTAM 25 GRAM(S): 4; .5 INJECTION, POWDER, LYOPHILIZED, FOR SOLUTION INTRAVENOUS at 06:35

## 2022-07-29 RX ADMIN — HEPARIN SODIUM 5000 UNIT(S): 5000 INJECTION INTRAVENOUS; SUBCUTANEOUS at 20:17

## 2022-07-29 RX ADMIN — HEPARIN SODIUM 5000 UNIT(S): 5000 INJECTION INTRAVENOUS; SUBCUTANEOUS at 06:37

## 2022-07-29 RX ADMIN — PANTOPRAZOLE SODIUM 40 MILLIGRAM(S): 20 TABLET, DELAYED RELEASE ORAL at 06:37

## 2022-07-29 RX ADMIN — Medication 3 MILLIGRAM(S): at 21:45

## 2022-07-29 RX ADMIN — PIPERACILLIN AND TAZOBACTAM 25 GRAM(S): 4; .5 INJECTION, POWDER, LYOPHILIZED, FOR SOLUTION INTRAVENOUS at 14:33

## 2022-07-29 RX ADMIN — Medication 100 MILLIEQUIVALENT(S): at 09:21

## 2022-07-29 NOTE — PROGRESS NOTE ADULT - ASSESSMENT
89M PMHx dementia (AAO x 2 at baseline), Afib on eliquis, DM, HTN, [place], recently COVID-19+ on 7/10 s/p Dexamethasone and Remdesivir, presenting for AMS with worsening confusion and decreased responsiveness, found to be in septic shock secondary to aspiration pnuemonia

## 2022-07-29 NOTE — PROGRESS NOTE ADULT - SUBJECTIVE AND OBJECTIVE BOX
Dr. Arlette Kwok  Pager [phone]    PROGRESS NOTE:     Patient is a 89y old  Male who presents with a chief complaint of Increasing confusion, cold like symptoms, covid exposure (28 Jul 2022 13:13)      SUBJECTIVE / OVERNIGHT EVENTS: pt has no specific complaints  ADDITIONAL REVIEW OF SYSTEMS: afebrile, poor intake    MEDICATIONS  (STANDING):  calcium gluconate IVPB 1 Gram(s) IV Intermittent once  dextrose 5% + sodium chloride 0.9% with potassium chloride 20 mEq/L 1000 milliLiter(s) (60 mL/Hr) IV Continuous <Continuous>  dextrose 5%. 1000 milliLiter(s) (100 mL/Hr) IV Continuous <Continuous>  dextrose 5%. 1000 milliLiter(s) (50 mL/Hr) IV Continuous <Continuous>  dextrose 5%. 1000 milliLiter(s) (50 mL/Hr) IV Continuous <Continuous>  dextrose 5%. 1000 milliLiter(s) (100 mL/Hr) IV Continuous <Continuous>  dextrose 50% Injectable 25 Gram(s) IV Push once  dextrose 50% Injectable 12.5 Gram(s) IV Push once  dextrose 50% Injectable 25 Gram(s) IV Push once  dextrose 50% Injectable 25 Gram(s) IV Push once  dextrose 50% Injectable 12.5 Gram(s) IV Push once  dextrose 50% Injectable 25 Gram(s) IV Push once  donepezil 5 milliGRAM(s) Oral at bedtime  glucagon  Injectable 1 milliGRAM(s) IntraMuscular once  glucagon  Injectable 1 milliGRAM(s) IntraMuscular once  heparin   Injectable 5000 Unit(s) SubCutaneous every 12 hours  insulin glargine Injectable (LANTUS) 10 Unit(s) SubCutaneous at bedtime  insulin lispro (ADMELOG) corrective regimen sliding scale   SubCutaneous three times a day before meals  insulin lispro (ADMELOG) corrective regimen sliding scale   SubCutaneous at bedtime  melatonin 3 milliGRAM(s) Oral at bedtime  pantoprazole   Suspension 40 milliGRAM(s) Oral before breakfast  piperacillin/tazobactam IVPB.. 3.375 Gram(s) IV Intermittent every 8 hours  potassium chloride  10 mEq/100 mL IVPB 10 milliEquivalent(s) IV Intermittent every 1 hour  potassium phosphate IVPB 15 milliMole(s) IV Intermittent once  simvastatin 20 milliGRAM(s) Oral at bedtime  thiamine IVPB 500 milliGRAM(s) IV Intermittent daily    MEDICATIONS  (PRN):  acetaminophen     Tablet .. 650 milliGRAM(s) Oral every 4 hours PRN Temp greater or equal to 38C (100.4F), Severe Pain (7 - 10)  ALBUTerol    90 MICROgram(s) HFA Inhaler 2 Puff(s) Inhalation every 6 hours PRN Bronchospasm  dextrose Oral Gel 15 Gram(s) Oral once PRN Blood Glucose LESS THAN 70 milliGRAM(s)/deciliter  dextrose Oral Gel 15 Gram(s) Oral once PRN Blood Glucose LESS THAN 70 milliGRAM(s)/deciliter  guaiFENesin Oral Liquid (Sugar-Free) 100 milliGRAM(s) Oral every 6 hours PRN Cough  metoprolol tartrate Injectable 5 milliGRAM(s) IV Push every 6 hours PRN sustained HR over 140 bpm      CAPILLARY BLOOD GLUCOSE      POCT Blood Glucose.: 125 mg/dL (29 Jul 2022 07:10)  POCT Blood Glucose.: 111 mg/dL (28 Jul 2022 22:10)  POCT Blood Glucose.: 103 mg/dL (28 Jul 2022 20:46)  POCT Blood Glucose.: 90 mg/dL (28 Jul 2022 16:56)  POCT Blood Glucose.: 116 mg/dL (28 Jul 2022 11:55)    I&O's Summary    28 Jul 2022 07:01  -  29 Jul 2022 07:00  --------------------------------------------------------  IN: 1470 mL / OUT: 0 mL / NET: 1470 mL        PHYSICAL EXAM:  Vital Signs Last 24 Hrs  T(C): 37 (29 Jul 2022 06:00), Max: 37.1 (28 Jul 2022 22:46)  T(F): 98.6 (29 Jul 2022 06:00), Max: 98.8 (28 Jul 2022 22:46)  HR: 72 (29 Jul 2022 06:00) (72 - 77)  BP: 130/68 (29 Jul 2022 06:00) (126/50 - 130/68)  BP(mean): --  RR: 18 (29 Jul 2022 06:00) (17 - 18)  SpO2: 95% (29 Jul 2022 06:00) (95% - 98%)    Parameters below as of 29 Jul 2022 06:00  Patient On (Oxygen Delivery Method): nasal cannula  O2 Flow (L/min): 2    General: NAD, cachectic. not following commands, very hard of hearing  HEENT: MMM, poor dentition  Neck: trachea midline   Respiratory: CTA b/l. No rales, rhonchi, wheezing appreciated.  Cardiovascular: RRR. +s1/s2, -s3/s4. No murmur, rubs, gallops. No edema  Abdomen: NT/ND. +BS, No HSM.   Extremities: 2+ pulses  Skin: edematous. No rashes, ecchymoses, or petechiae noted  Neurological: a/0x1 self, incoherent, confused    LABS:                        9.4    7.92  )-----------( 85       ( 28 Jul 2022 09:15 )             30.0     07-29    133<L>  |  99  |  24<H>  ----------------------------<  102<H>  3.4<L>   |  24  |  0.93    Ca    7.2<L>      29 Jul 2022 05:50  Phos  2.0     07-29  Mg     1.60     07-29                  RADIOLOGY & ADDITIONAL TESTS:  Results Reviewed:   Imaging Personally Reviewed:  Electrocardiogram Personally Reviewed:    COORDINATION OF CARE:  Care Discussed with Consultants/Other Providers [Y/N]: acp [facility], needs LTC placement  Prior or Outpatient Records Reviewed [Y/N]:

## 2022-07-29 NOTE — PROGRESS NOTE ADULT - PROBLEM SELECTOR PLAN 6
TENZIN with hypernatremia, creat downtrending  hypernatremia resolved, change IVF to d5ns + 20 kcl  hypokalemia repleted

## 2022-07-29 NOTE — PROGRESS NOTE ADULT - PROBLEM SELECTOR PLAN 1
c/w zosyn to complete 7-10 day course for Aspiration PNA, dose adjusted per renal fxn  -on NC 2L, recent covid infection 7/10 s/p rem/dex  -CT chest (7/27) Multifocal and bilateral tree-in-bud nodular opacities and patchy   consolidation compatible with aspiration or infection. Occluded LLL airways presumably secondary to secretions/mucous plugging and complete LLL collapse. Small bilateral pleural effusions.  -Chest PT  -Diet advanced to puree diet, pleasure feeds per family, advised family high risk of aspiration  -overall prognosis poor with multiple comorbidities, seen by palliative care, family okay with pleasure feeds and dc plan to LTC placement with transition to hospice, f/u SW   No plan for NGT or tube feed   -dispo: DC plan LTC facility with transition to hospice. Updated son in law Zeinab on 7/27  I had peer to peer review with Dr. Sigala on 7/29, insurance declined rehab as pt is not really participating with PT, only trying to do some bed exercises. Can have PT reeval today to see if pt can participate and fax PT note to [phone] by 4pm to see if he qualifies for rehab. c/w zosyn to complete 7-10 day course for Aspiration PNA, dose adjusted per renal fxn  -on NC 2L, recent covid infection 7/10 s/p rem/dex  -CT chest (7/27) Multifocal and bilateral tree-in-bud nodular opacities and patchy   consolidation compatible with aspiration or infection. Occluded LLL airways presumably secondary to secretions/mucous plugging and complete LLL collapse. Small bilateral pleural effusions.  -Chest PT  -Diet advanced to puree diet, pleasure feeds per family, advised family high risk of aspiration  -overall prognosis poor with multiple comorbidities, seen by palliative care, family okay with pleasure feeds and dc plan to LTC placement with transition to hospice, f/u SW   No plan for NGT or tube feed   -dispo: DC plan LTC facility with transition to hospice. Updated son in law Zeinab on 7/27  I had peer to peer review with Dr. Sigala on 7/29, insurance declined rehab as pt is not really participating with PT, only trying to do some bed exercises. Can have PT reeval today to see if pt can participate and fax PT note to [phone] by 4pm to see if he qualifies for rehab. I doubt pt can participate with PT currently.    Addendum: family d/w palliative care, now wants hospice, no more IVF or blood draws, can complete zosyn course, d/w ACP c/w zosyn to complete 7-10 day course for Aspiration PNA, dose adjusted per renal fxn  -on NC 2L, recent covid infection 7/10 s/p rem/dex  -CT chest (7/27) Multifocal and bilateral tree-in-bud nodular opacities and patchy   consolidation compatible with aspiration or infection. Occluded LLL airways presumably secondary to secretions/mucous plugging and complete LLL collapse. Small bilateral pleural effusions.  -Chest PT  -Diet advanced to puree diet, pleasure feeds per family, advised family high risk of aspiration  -overall prognosis poor with multiple comorbidities, seen by palliative care, family okay with pleasure feeds and dc plan to LTC placement with transition to hospice, f/u SW   No plan for NGT or tube feed   -dispo: DC plan LTC facility with transition to hospice. Updated son in law Zeinab on 7/27  I had peer to peer review with Dr. Sigala on 7/29, insurance declined rehab as pt is not really participating with PT, only trying to do some bed exercises. Can have PT reeval today to see if pt can participate and fax PT note to [phone] by 4pm to see if he qualifies for rehab. I doubt pt can participate with PT currently.    Addendum: family d/w palliative care, now symptom management, no more IVF or blood draws, can complete zosyn course, I also discussed with pt's daughter and son in law on 7/28, hoping for LTC with hospice or inpt hospice if condition deteriorates c/w zosyn to complete 7-10 day course for Aspiration PNA, dose adjusted per renal fxn  -on NC 2L, recent covid infection 7/10 s/p rem/dex  -CT chest (7/27) Multifocal and bilateral tree-in-bud nodular opacities and patchy   consolidation compatible with aspiration or infection. Occluded LLL airways presumably secondary to secretions/mucous plugging and complete LLL collapse. Small bilateral pleural effusions.  -Chest PT  -Diet advanced to puree diet, pleasure feeds per family, advised family high risk of aspiration  -overall prognosis poor with multiple comorbidities, seen by palliative care, family okay with pleasure feeds and dc plan to LTC placement with transition to hospice, f/u SW   No plan for NGT or tube feed   -dispo: DC plan LTC facility with transition to hospice. Updated son in law Zeinab on 7/27  I had peer to peer review with Dr. Sigala on 7/29, insurance declined rehab as pt is not really participating with PT, only trying to do some bed exercises. Can have PT reeval today to see if pt can participate and fax PT note to [phone] by 4pm to see if he qualifies for rehab. I doubt pt can participate with PT currently.    Addendum: family d/w palliative care, now symptom management, no more IVF or blood draws, can complete zosyn course, I also discussed with pt's daughter and son in law on 7/29, confirmed family's wishes, they are hoping for LTC with hospice or inpt hospice if condition deteriorates

## 2022-07-29 NOTE — GOALS OF CARE CONVERSATION - ADVANCED CARE PLANNING - CONVERSATION DETAILS
comfort measures only - no further blood draws, symptom directed care, family is okay to stop IV fluids     Discussed criteria for hospice at home vs. nursing home vs. long term care Palliative provider received phone call from patient's daughter seeking continued discussions about goc. Family state that they have been struggling with transitioning to comfort care but believe that patient is not improving despite IVF. They feel that continuing blood work is not going to change the outcome and were amenable to comfort measures only - no further blood draws, symptom directed care, family is okay to stop IV fluids.     Discussed eligibility criteria for hospice at home vs. nursing home vs. long term care. Family understands that patient does not meet criteria for inpatient hospice at this time, and he does not have insurance that covers long term care. Family struggling with the fact that patient's wife is in [place] and continues to ask about her . Family would like him to have PT eval for possible rehab placement so he could be with his wife. Family will wait for PT eval and continue to discuss hospice decision pending PT assessment.

## 2022-07-30 PROCEDURE — 99232 SBSQ HOSP IP/OBS MODERATE 35: CPT

## 2022-07-30 RX ADMIN — HEPARIN SODIUM 5000 UNIT(S): 5000 INJECTION INTRAVENOUS; SUBCUTANEOUS at 06:09

## 2022-07-30 RX ADMIN — Medication 3 MILLIGRAM(S): at 22:38

## 2022-07-30 RX ADMIN — DONEPEZIL HYDROCHLORIDE 5 MILLIGRAM(S): 10 TABLET, FILM COATED ORAL at 22:38

## 2022-07-30 RX ADMIN — PIPERACILLIN AND TAZOBACTAM 25 GRAM(S): 4; .5 INJECTION, POWDER, LYOPHILIZED, FOR SOLUTION INTRAVENOUS at 18:07

## 2022-07-30 RX ADMIN — SIMVASTATIN 20 MILLIGRAM(S): 20 TABLET, FILM COATED ORAL at 22:38

## 2022-07-30 RX ADMIN — Medication 105 MILLIGRAM(S): at 13:56

## 2022-07-30 RX ADMIN — PIPERACILLIN AND TAZOBACTAM 25 GRAM(S): 4; .5 INJECTION, POWDER, LYOPHILIZED, FOR SOLUTION INTRAVENOUS at 06:08

## 2022-07-30 RX ADMIN — HEPARIN SODIUM 5000 UNIT(S): 5000 INJECTION INTRAVENOUS; SUBCUTANEOUS at 18:54

## 2022-07-30 RX ADMIN — PANTOPRAZOLE SODIUM 40 MILLIGRAM(S): 20 TABLET, DELAYED RELEASE ORAL at 06:08

## 2022-07-30 NOTE — PROGRESS NOTE ADULT - PROBLEM SELECTOR PLAN 1
c/w zosyn to complete 7-10 day course for Aspiration PNA, dose adjusted per renal fxn  -on NC 2L, recent covid infection 7/10 s/p rem/dex  -CT chest (7/27) Multifocal and bilateral tree-in-bud nodular opacities and patchy   consolidation compatible with aspiration or infection. Occluded LLL airways presumably secondary to secretions/mucous plugging and complete LLL collapse. Small bilateral pleural effusions.  -Chest PT  -Diet advanced to puree diet, pleasure feeds per family, advised family high risk of aspiration  -overall prognosis poor with multiple comorbidities, seen by palliative care, family okay with pleasure feeds and dc plan to LTC placement with transition to hospice, f/u SW   No plan for NGT or tube feed   -dispo: DC plan LTC facility with transition to hospice. Updated son in law Zeinab and daughter Maddie on 7/30, more interactive and talking more today, they are requesting PMR consult to eval for rehab candidacy (ordered), still hold off on labs and IVF at this time per their conversation with me and palliative care, will call me if they change their mind.

## 2022-07-30 NOTE — PROGRESS NOTE ADULT - SUBJECTIVE AND OBJECTIVE BOX
Dr. Arlette Kwok  Pager [phone]    PROGRESS NOTE:     Patient is a 89y old  Male who presents with a chief complaint of Increasing confusion, cold like symptoms, covid exposure (29 Jul 2022 11:40)      SUBJECTIVE / OVERNIGHT EVENTS: pt is more interactive today, weaned to RA  ADDITIONAL REVIEW OF SYSTEMS: afebrile, more talkative and alert today    MEDICATIONS  (STANDING):  dextrose 5%. 1000 milliLiter(s) (100 mL/Hr) IV Continuous <Continuous>  dextrose 5%. 1000 milliLiter(s) (50 mL/Hr) IV Continuous <Continuous>  dextrose 5%. 1000 milliLiter(s) (50 mL/Hr) IV Continuous <Continuous>  dextrose 5%. 1000 milliLiter(s) (100 mL/Hr) IV Continuous <Continuous>  dextrose 50% Injectable 25 Gram(s) IV Push once  dextrose 50% Injectable 12.5 Gram(s) IV Push once  dextrose 50% Injectable 25 Gram(s) IV Push once  dextrose 50% Injectable 25 Gram(s) IV Push once  dextrose 50% Injectable 12.5 Gram(s) IV Push once  dextrose 50% Injectable 25 Gram(s) IV Push once  donepezil 5 milliGRAM(s) Oral at bedtime  glucagon  Injectable 1 milliGRAM(s) IntraMuscular once  glucagon  Injectable 1 milliGRAM(s) IntraMuscular once  heparin   Injectable 5000 Unit(s) SubCutaneous every 12 hours  melatonin 3 milliGRAM(s) Oral at bedtime  pantoprazole   Suspension 40 milliGRAM(s) Oral before breakfast  piperacillin/tazobactam IVPB.. 3.375 Gram(s) IV Intermittent every 8 hours  simvastatin 20 milliGRAM(s) Oral at bedtime  thiamine IVPB 500 milliGRAM(s) IV Intermittent daily    MEDICATIONS  (PRN):  acetaminophen     Tablet .. 650 milliGRAM(s) Oral every 4 hours PRN Temp greater or equal to 38C (100.4F), Severe Pain (7 - 10)  ALBUTerol    90 MICROgram(s) HFA Inhaler 2 Puff(s) Inhalation every 6 hours PRN Bronchospasm  dextrose Oral Gel 15 Gram(s) Oral once PRN Blood Glucose LESS THAN 70 milliGRAM(s)/deciliter  dextrose Oral Gel 15 Gram(s) Oral once PRN Blood Glucose LESS THAN 70 milliGRAM(s)/deciliter  guaiFENesin Oral Liquid (Sugar-Free) 100 milliGRAM(s) Oral every 6 hours PRN Cough  metoprolol tartrate Injectable 5 milliGRAM(s) IV Push every 6 hours PRN sustained HR over 140 bpm      CAPILLARY BLOOD GLUCOSE      POCT Blood Glucose.: 80 mg/dL (30 Jul 2022 07:36)  POCT Blood Glucose.: 107 mg/dL (29 Jul 2022 22:06)  POCT Blood Glucose.: 125 mg/dL (29 Jul 2022 17:26)    I&O's Summary    29 Jul 2022 07:01  -  30 Jul 2022 07:00  --------------------------------------------------------  IN: 200 mL / OUT: 0 mL / NET: 200 mL        PHYSICAL EXAM:  Vital Signs Last 24 Hrs  T(C): 36.5 (30 Jul 2022 06:00), Max: 36.7 (29 Jul 2022 22:00)  T(F): 97.7 (30 Jul 2022 06:00), Max: 98 (29 Jul 2022 22:00)  HR: 73 (30 Jul 2022 06:00) (73 - 81)  BP: 115/75 (30 Jul 2022 06:00) (115/75 - 121/82)  BP(mean): --  RR: 16 (30 Jul 2022 06:00) (16 - 17)  SpO2: 98% (30 Jul 2022 06:00) (95% - 98%)    Parameters below as of 30 Jul 2022 06:00  Patient On (Oxygen Delivery Method): room air      General: NAD, cachectic, thn frail man, more interactive today   very hard of hearing  HEENT: MMM, poor dentition  Neck: trachea midline   Respiratory: mainly clear, basilar carckle.  Cardiovascular: RRR. +s1/s2, -s3/s4. No murmur, rubs, gallops. No edema  Abdomen: NT/ND. +BS, No HSM.   Extremities: 2+ pulses  Skin: edematous. No rashes, ecchymoses, or petechiae noted  Neurological: a/0x2, talkative, more interactive today    LABS:    07-29    133<L>  |  99  |  24<H>  ----------------------------<  102<H>  3.4<L>   |  24  |  0.93    Ca    7.2<L>      29 Jul 2022 05:50  Phos  2.0     07-29  Mg     1.60     07-29        RADIOLOGY & ADDITIONAL TESTS:  Results Reviewed:   Imaging Personally Reviewed:  < from: CT Chest No Cont (07.27.22 @ 15:40) >  IMPRESSION:    Multifocal and bilateral tree-in-bud nodular opacities and patchy   consolidation compatible with aspiration or infection.    Occluded left lower lobe airways presumably secondary to   secretions/mucous plugging and complete left lower lobe collapse.    Small bilateral pleural effusions.    Recommend follow-up CT chest in one month to ensure clearing.      Electrocardiogram Personally Reviewed:    COORDINATION OF CARE:  Care Discussed with Consultants/Other Providers [Y/N]:  Prior or Outpatient Records Reviewed [Y/N]:   Dr. Arlette Kwok  Pager [phone]    PROGRESS NOTE:     Patient is a 89y old  Male who presents with a chief complaint of Increasing confusion, cold like symptoms, covid exposure (29 Jul 2022 11:40)      SUBJECTIVE / OVERNIGHT EVENTS: pt is more interactive today, weaned to RA  ADDITIONAL REVIEW OF SYSTEMS: afebrile, more talkative and alert today    MEDICATIONS  (STANDING):  dextrose 5%. 1000 milliLiter(s) (100 mL/Hr) IV Continuous <Continuous>  dextrose 5%. 1000 milliLiter(s) (50 mL/Hr) IV Continuous <Continuous>  dextrose 5%. 1000 milliLiter(s) (50 mL/Hr) IV Continuous <Continuous>  dextrose 5%. 1000 milliLiter(s) (100 mL/Hr) IV Continuous <Continuous>  dextrose 50% Injectable 25 Gram(s) IV Push once  dextrose 50% Injectable 12.5 Gram(s) IV Push once  dextrose 50% Injectable 25 Gram(s) IV Push once  dextrose 50% Injectable 25 Gram(s) IV Push once  dextrose 50% Injectable 12.5 Gram(s) IV Push once  dextrose 50% Injectable 25 Gram(s) IV Push once  donepezil 5 milliGRAM(s) Oral at bedtime  glucagon  Injectable 1 milliGRAM(s) IntraMuscular once  glucagon  Injectable 1 milliGRAM(s) IntraMuscular once  heparin   Injectable 5000 Unit(s) SubCutaneous every 12 hours  melatonin 3 milliGRAM(s) Oral at bedtime  pantoprazole   Suspension 40 milliGRAM(s) Oral before breakfast  piperacillin/tazobactam IVPB.. 3.375 Gram(s) IV Intermittent every 8 hours  simvastatin 20 milliGRAM(s) Oral at bedtime  thiamine IVPB 500 milliGRAM(s) IV Intermittent daily    MEDICATIONS  (PRN):  acetaminophen     Tablet .. 650 milliGRAM(s) Oral every 4 hours PRN Temp greater or equal to 38C (100.4F), Severe Pain (7 - 10)  ALBUTerol    90 MICROgram(s) HFA Inhaler 2 Puff(s) Inhalation every 6 hours PRN Bronchospasm  dextrose Oral Gel 15 Gram(s) Oral once PRN Blood Glucose LESS THAN 70 milliGRAM(s)/deciliter  dextrose Oral Gel 15 Gram(s) Oral once PRN Blood Glucose LESS THAN 70 milliGRAM(s)/deciliter  guaiFENesin Oral Liquid (Sugar-Free) 100 milliGRAM(s) Oral every 6 hours PRN Cough  metoprolol tartrate Injectable 5 milliGRAM(s) IV Push every 6 hours PRN sustained HR over 140 bpm      CAPILLARY BLOOD GLUCOSE      POCT Blood Glucose.: 80 mg/dL (30 Jul 2022 07:36)  POCT Blood Glucose.: 107 mg/dL (29 Jul 2022 22:06)  POCT Blood Glucose.: 125 mg/dL (29 Jul 2022 17:26)    I&O's Summary    29 Jul 2022 07:01  -  30 Jul 2022 07:00  --------------------------------------------------------  IN: 200 mL / OUT: 0 mL / NET: 200 mL        PHYSICAL EXAM:  Vital Signs Last 24 Hrs  T(C): 36.5 (30 Jul 2022 06:00), Max: 36.7 (29 Jul 2022 22:00)  T(F): 97.7 (30 Jul 2022 06:00), Max: 98 (29 Jul 2022 22:00)  HR: 73 (30 Jul 2022 06:00) (73 - 81)  BP: 115/75 (30 Jul 2022 06:00) (115/75 - 121/82)  BP(mean): --  RR: 16 (30 Jul 2022 06:00) (16 - 17)  SpO2: 98% (30 Jul 2022 06:00) (95% - 98%)    Parameters below as of 30 Jul 2022 06:00  Patient On (Oxygen Delivery Method): room air      General: NAD, cachectic, thn frail man, more interactive today   very hard of hearing  HEENT: MMM, poor dentition  Neck: trachea midline   Respiratory: mainly clear, basilar carckle.  Cardiovascular: RRR. +s1/s2, -s3/s4. No murmur, rubs, gallops. No edema  Abdomen: NT/ND. +BS, No HSM.   Extremities: 2+ pulses  Skin: edematous. No rashes, ecchymoses, or petechiae noted  Neurological: a/0x2, talkative, more interactive today    LABS:    07-29    133<L>  |  99  |  24<H>  ----------------------------<  102<H>  3.4<L>   |  24  |  0.93    Ca    7.2<L>      29 Jul 2022 05:50  Phos  2.0     07-29  Mg     1.60     07-29        RADIOLOGY & ADDITIONAL TESTS:  Results Reviewed:   Imaging Personally Reviewed:  < from: CT Chest No Cont (07.27.22 @ 15:40) >  IMPRESSION:    Multifocal and bilateral tree-in-bud nodular opacities and patchy   consolidation compatible with aspiration or infection.    Occluded left lower lobe airways presumably secondary to   secretions/mucous plugging and complete left lower lobe collapse.    Small bilateral pleural effusions.    Recommend follow-up CT chest in one month to ensure clearing.      Electrocardiogram Personally Reviewed:    COORDINATION OF CARE:  Care Discussed with Consultants/Other Providers [Y/N]: acp [place], PMR consult, mental status improving  Prior or Outpatient Records Reviewed [Y/N]:

## 2022-07-30 NOTE — PROGRESS NOTE ADULT - ASSESSMENT
89M PMHx dementia (AAO x 2 at baseline), Afib on eliquis, DM, HTN, [place], recently COVID-19+ on 7/10 s/p Dexamethasone and Remdesivir, presenting for AMS with worsening confusion and decreased responsiveness, found to be in septic shock secondary to aspiration pnuemonia

## 2022-07-30 NOTE — PROGRESS NOTE ADULT - PROBLEM SELECTOR PLAN 4
-FS q6h and SSI  - has been hyperglycemic since receiving steroid, dc steroid as no clear evidence of adrenal insufficiency  -A1c 6.1%  off lantus now, encourage po intake with assist

## 2022-07-30 NOTE — PROGRESS NOTE ADULT - PROBLEM SELECTOR PLAN 6
TENZIN with hypernatremia, creat downtrending  hypernatremia resolved  hypokalemia repleted  no further labs per family

## 2022-07-31 PROCEDURE — 99232 SBSQ HOSP IP/OBS MODERATE 35: CPT

## 2022-07-31 RX ADMIN — PIPERACILLIN AND TAZOBACTAM 25 GRAM(S): 4; .5 INJECTION, POWDER, LYOPHILIZED, FOR SOLUTION INTRAVENOUS at 04:03

## 2022-07-31 RX ADMIN — HEPARIN SODIUM 5000 UNIT(S): 5000 INJECTION INTRAVENOUS; SUBCUTANEOUS at 06:10

## 2022-07-31 RX ADMIN — SIMVASTATIN 20 MILLIGRAM(S): 20 TABLET, FILM COATED ORAL at 21:33

## 2022-07-31 RX ADMIN — Medication 3 MILLIGRAM(S): at 21:33

## 2022-07-31 RX ADMIN — PANTOPRAZOLE SODIUM 40 MILLIGRAM(S): 20 TABLET, DELAYED RELEASE ORAL at 06:10

## 2022-07-31 RX ADMIN — HEPARIN SODIUM 5000 UNIT(S): 5000 INJECTION INTRAVENOUS; SUBCUTANEOUS at 17:38

## 2022-07-31 RX ADMIN — DONEPEZIL HYDROCHLORIDE 5 MILLIGRAM(S): 10 TABLET, FILM COATED ORAL at 21:33

## 2022-08-01 LAB
GLUCOSE BLDC GLUCOMTR-MCNC: 102 MG/DL — HIGH (ref 70–99)
GLUCOSE BLDC GLUCOMTR-MCNC: 122 MG/DL — HIGH (ref 70–99)
GLUCOSE BLDC GLUCOMTR-MCNC: 123 MG/DL — HIGH (ref 70–99)
SARS-COV-2 RNA SPEC QL NAA+PROBE: DETECTED

## 2022-08-01 PROCEDURE — 99232 SBSQ HOSP IP/OBS MODERATE 35: CPT

## 2022-08-01 PROCEDURE — 99222 1ST HOSP IP/OBS MODERATE 55: CPT

## 2022-08-01 RX ADMIN — DONEPEZIL HYDROCHLORIDE 5 MILLIGRAM(S): 10 TABLET, FILM COATED ORAL at 22:31

## 2022-08-01 RX ADMIN — HEPARIN SODIUM 5000 UNIT(S): 5000 INJECTION INTRAVENOUS; SUBCUTANEOUS at 06:03

## 2022-08-01 RX ADMIN — PANTOPRAZOLE SODIUM 40 MILLIGRAM(S): 20 TABLET, DELAYED RELEASE ORAL at 06:04

## 2022-08-01 RX ADMIN — Medication 3 MILLIGRAM(S): at 22:31

## 2022-08-01 RX ADMIN — HEPARIN SODIUM 5000 UNIT(S): 5000 INJECTION INTRAVENOUS; SUBCUTANEOUS at 17:59

## 2022-08-01 RX ADMIN — SIMVASTATIN 20 MILLIGRAM(S): 20 TABLET, FILM COATED ORAL at 22:31

## 2022-08-01 NOTE — CONSULT NOTE ADULT - SUBJECTIVE AND OBJECTIVE BOX
HPI:  89M PMHx dementia (AAO x 2 at baseline), Afib on eliquis, DM, HTN, hard of hearing who presents with cough, sneezing, and increased confusion at home after Covid exposure Wednesday July 6. Of note, patient currently AAO x 1 is unable to provide any ROS or contribution to information regarding symptoms due to dementia/delirium. As per his son in law Dr. Zeinab Frost (cardiologist; [phone]), his baseline mental status is AAO x 2 usually, able to basically care for himself for the most part with help of HHA at home. Ambulates independently. As per my discussion with his daughter Maddie, patient has been coughing and sneezing and has been "sundowning" at home, more confused than baseline. He lives with his wife who is currently in ER as well, who also presented with Covid and cognitive decline/increasing confusion. Patient denies having any pain when asked in [place]ic using , as well as staff at bedside who speaks Icelandic. Denied having any other complaints but very limited history as noted above. In the ED, he was noted to have wheezing and mild tachypnea, was given duonebs and decadron, 500cc LR, and haldol 2.5mg IV for agitation. He was noted to have fever of 100.8F and be saturating well on room air.  (11 Jul 2022 08:33)    Patient was admitted on 7/11, found to be COVID+, treated with Decadron, remdesivir, with intermittent tachycardia, rapid response called 7/22 for AMS, hypotensive, patient seen today on [address] [place], on room air, +[place], used [place] telephone , patient does not respond to all questions. States he feels his left leg is heavy.     REVIEW OF SYSTEMS  Constitutional - No fever, No weight loss, No fatigue  Respiratory - No cough, No wheezing, No shortness of breath  Cardiovascular - No chest pain, No palpitations  Gastrointestinal - No abdominal pain, No nausea, No vomiting, No diarrhea, No constipation  Genitourinary - No dysuria, No frequency, No hematuria, No incontinence  Psychiatric - No depression, No anxiety    VITALS  T(C): 36.6 (08-01-22 @ 06:05), Max: 36.9 (07-31-22 @ 17:16)  HR: 75 (08-01-22 @ 06:05) (75 - 86)  BP: 119/65 (08-01-22 @ 06:05) (119/65 - 131/66)  RR: 18 (08-01-22 @ 06:05) (18 - 19)  SpO2: 100% (08-01-22 @ 06:05) (100% - 100%)  Wt(kg): --    PAST MEDICAL & SURGICAL HISTORY  Afib    Diabetes mellitus    Hypertension    Hard of hearing    Dementia    Cataract, bilateral    No significant past surgical history      FUNCTIONAL HISTORY  Lives in house, +stairs, +HHA 3-4 times/week  Used AD PTA     CURRENT FUNCTIONAL STATUS  7/26 SLP  patient refusing PO trials     7/28 PT  therapeutic exercises, passive ROM    7/26 PT  therapeutic exercises, active assisted ROM     7/21 PT  bed mobility mod assist  transfers max assist   gait max assist x 3 steps, RW      FAMILY HISTORY   No pertinent family history in first degree relatives    RECENT LABS/IMAGING              ALLERGIES  No Known Allergies      MEDICATIONS   acetaminophen     Tablet .. 650 milliGRAM(s) Oral every 4 hours PRN  ALBUTerol    90 MICROgram(s) HFA Inhaler 2 Puff(s) Inhalation every 6 hours PRN  dextrose 5%. 1000 milliLiter(s) IV Continuous <Continuous>  dextrose 5%. 1000 milliLiter(s) IV Continuous <Continuous>  dextrose 5%. 1000 milliLiter(s) IV Continuous <Continuous>  dextrose 5%. 1000 milliLiter(s) IV Continuous <Continuous>  dextrose 50% Injectable 25 Gram(s) IV Push once  dextrose 50% Injectable 12.5 Gram(s) IV Push once  dextrose 50% Injectable 25 Gram(s) IV Push once  dextrose 50% Injectable 25 Gram(s) IV Push once  dextrose 50% Injectable 12.5 Gram(s) IV Push once  dextrose 50% Injectable 25 Gram(s) IV Push once  dextrose Oral Gel 15 Gram(s) Oral once PRN  dextrose Oral Gel 15 Gram(s) Oral once PRN  donepezil 5 milliGRAM(s) Oral at bedtime  glucagon  Injectable 1 milliGRAM(s) IntraMuscular once  glucagon  Injectable 1 milliGRAM(s) IntraMuscular once  guaiFENesin Oral Liquid (Sugar-Free) 100 milliGRAM(s) Oral every 6 hours PRN  heparin   Injectable 5000 Unit(s) SubCutaneous every 12 hours  melatonin 3 milliGRAM(s) Oral at bedtime  metoprolol tartrate Injectable 5 milliGRAM(s) IV Push every 6 hours PRN  pantoprazole   Suspension 40 milliGRAM(s) Oral before breakfast  simvastatin 20 milliGRAM(s) Oral at bedtime      ----------------------------------------------------------------------------------------  PHYSICAL EXAM  Constitutional - NAD, Comfortable, laying in bed   Chest - Breathing comfortably  Cardiovascular - S1S2   Abdomen - Soft   Extremities - b/l LE atrophy, UE with edema  Neurologic Exam -       limited command following              Cognitive - Awake, Alert, does not state name     Communication - Fluent     Cranial Nerves - CN 2-12 intact     Motor - No focal deficits                    LEFT    UE - ShAB 5/5, EF 5/5, EE 5/5, WE 5/5,  5/5                    RIGHT UE - ShAB 5/5, EF 5/5, EE 5/5, WE 5/5,  5/5                    LEFT    LE - HF 5/5, KE 5/5, DF 5/5, PF 5/5                    RIGHT LE - HF 5/5, KE 5/5, DF 5/5, PF 5/5        Sensory - Intact to LT    Psychiatric - Mood stable, Affect WNL  ----------------------------------------------------------------------------------------  ASSESSMENT/PLAN  89yMale h/o afib on eliquis, dementia, DM, HTN, [place] with functional deficits after COVID  treated with dexamethasone, remdesivir, on room air  aspiration pneumonia, septic shock, zosyn, on pleasure feeds  dementia, on donepezil  Pain - Tylenol  continue bedside therapy, limited participation with therapy over past few days  can consider Subacute Rehabilitation for cognitive therapy, safety assessment, reconditioning, ADL and mobility restoration in a supervised setting, with transition to Long term care/hospice   DVT PPX - SCDs, heparin  Rehab - Will continue to follow for ongoing rehab needs and recommendations.

## 2022-08-01 NOTE — CONSULT NOTE ADULT - REASON FOR ADMISSION
Increasing confusion, cold like symptoms, covid exposure

## 2022-08-01 NOTE — PROGRESS NOTE ADULT - SUBJECTIVE AND OBJECTIVE BOX
[facility] of [facility] Medicine  Mary Ann Winkler MD  Pager #[phone]    Patient is a 89y old  Male who presents with a chief complaint of Increasing confusion, cold like symptoms, covid exposure (01 Aug 2022 13:03)    SUBJECTIVE / OVERNIGHT EVENTS: No acute events. Not providing subjective.     MEDICATIONS  (STANDING):  dextrose 5%. 1000 milliLiter(s) (100 mL/Hr) IV Continuous <Continuous>  dextrose 5%. 1000 milliLiter(s) (50 mL/Hr) IV Continuous <Continuous>  dextrose 5%. 1000 milliLiter(s) (50 mL/Hr) IV Continuous <Continuous>  dextrose 5%. 1000 milliLiter(s) (100 mL/Hr) IV Continuous <Continuous>  dextrose 50% Injectable 25 Gram(s) IV Push once  dextrose 50% Injectable 12.5 Gram(s) IV Push once  dextrose 50% Injectable 25 Gram(s) IV Push once  dextrose 50% Injectable 25 Gram(s) IV Push once  dextrose 50% Injectable 12.5 Gram(s) IV Push once  dextrose 50% Injectable 25 Gram(s) IV Push once  donepezil 5 milliGRAM(s) Oral at bedtime  glucagon  Injectable 1 milliGRAM(s) IntraMuscular once  glucagon  Injectable 1 milliGRAM(s) IntraMuscular once  heparin   Injectable 5000 Unit(s) SubCutaneous every 12 hours  melatonin 3 milliGRAM(s) Oral at bedtime  pantoprazole   Suspension 40 milliGRAM(s) Oral before breakfast  simvastatin 20 milliGRAM(s) Oral at bedtime    MEDICATIONS  (PRN):  acetaminophen     Tablet .. 650 milliGRAM(s) Oral every 4 hours PRN Temp greater or equal to 38C (100.4F), Severe Pain (7 - 10)  ALBUTerol    90 MICROgram(s) HFA Inhaler 2 Puff(s) Inhalation every 6 hours PRN Bronchospasm  dextrose Oral Gel 15 Gram(s) Oral once PRN Blood Glucose LESS THAN 70 milliGRAM(s)/deciliter  dextrose Oral Gel 15 Gram(s) Oral once PRN Blood Glucose LESS THAN 70 milliGRAM(s)/deciliter  guaiFENesin Oral Liquid (Sugar-Free) 100 milliGRAM(s) Oral every 6 hours PRN Cough  metoprolol tartrate Injectable 5 milliGRAM(s) IV Push every 6 hours PRN sustained HR over 140 bpm    CAPILLARY BLOOD GLUCOSE    POCT Blood Glucose.: 102 mg/dL (01 Aug 2022 11:38)  POCT Blood Glucose.: 122 mg/dL (01 Aug 2022 07:37)  POCT Blood Glucose.: 117 mg/dL (31 Jul 2022 21:02)  POCT Blood Glucose.: 121 mg/dL (31 Jul 2022 17:09)    I&O's Summary    PHYSICAL EXAM:  Vital Signs Last 24 Hrs  T(C): 36.6 (01 Aug 2022 06:05), Max: 36.9 (31 Jul 2022 17:16)  T(F): 97.8 (01 Aug 2022 06:05), Max: 98.5 (31 Jul 2022 17:16)  HR: 75 (01 Aug 2022 06:05) (75 - 86)  BP: 119/65 (01 Aug 2022 06:05) (119/65 - 131/66)  BP(mean): --  RR: 18 (01 Aug 2022 06:05) (18 - 19)  SpO2: 100% (01 Aug 2022 06:05) (100% - 100%)    Parameters below as of 01 Aug 2022 06:05  Patient On (Oxygen Delivery Method): room air    CONSTITUTIONAL: NAD, laying in bed  ENMT: Moist oral mucosa  RESPIRATORY: no wheezing, faint bibasilar crackles  CARDIOVASCULAR: Regular rate and rhythm, normal S1 and S2, No lower extremity edema; Peripheral pulses are 2+ bilaterally  ABDOMEN: Nontender to palpation, normoactive bowel sounds, no rebound/guarding  SKIN: no rashes appreciated  PSYCH: Calm    LABS, Radiology Results Reviewed: Yes     COORDINATION OF CARE:  Care Discussed with Consultants/Other Providers [Y- Medicine ACP]

## 2022-08-01 NOTE — PROGRESS NOTE ADULT - NSPROGADDITIONALINFOA_GEN_ALL_CORE
Son in law Zeinab updated 8/1/22 Son in law Zeinab updated 8/1/22, advocating for rehab ideally at [facility] if an option (patient's wife also currently at [facility])

## 2022-08-01 NOTE — PROGRESS NOTE ADULT - ASSESSMENT
89 year old male with dementia (AAO x 2 at baseline), Afib on eliquis, DM, HTN, [place], recently COVID-19+ on 7/10 s/p Dexamethasone and Remdesivir, presenting for AMS with worsening confusion and decreased responsiveness, found to be in septic shock secondary to aspiration pneumonia

## 2022-08-01 NOTE — PROGRESS NOTE ADULT - PROBLEM SELECTOR PLAN 1
c/w zosyn to complete 7-10 day course for Aspiration PNA, dose adjusted per renal fxn  -on NC 2L, recent covid infection 7/10 s/p rem/dex  -CT chest (7/27) Multifocal and bilateral tree-in-bud nodular opacities and patchy   consolidation compatible with aspiration or infection. Occluded LLL airways presumably secondary to secretions/mucous plugging and complete LLL collapse. Small bilateral pleural effusions.  -Chest PT  -Diet advanced to puree diet, pleasure feeds per family, advised family high risk of aspiration  -overall prognosis poor with multiple comorbidities, seen by palliative care, family okay with pleasure feeds and dc plan to LTC placement with transition to hospice, f/u SW   No plan for NGT or tube feed   -dispo: DC plan LTC facility with transition to hospice. prior provider updated son in law Zeinab and daughter Maddie on 7/30, they are requesting PMR consult to eval for rehab candidacy still hold off on labs and IVF at this time per their conversation, f/u PMR recs

## 2022-08-02 LAB
GLUCOSE BLDC GLUCOMTR-MCNC: 114 MG/DL — HIGH (ref 70–99)
GLUCOSE BLDC GLUCOMTR-MCNC: 123 MG/DL — HIGH (ref 70–99)
GLUCOSE BLDC GLUCOMTR-MCNC: 126 MG/DL — HIGH (ref 70–99)
GLUCOSE BLDC GLUCOMTR-MCNC: 148 MG/DL — HIGH (ref 70–99)

## 2022-08-02 PROCEDURE — 99232 SBSQ HOSP IP/OBS MODERATE 35: CPT

## 2022-08-02 RX ADMIN — HEPARIN SODIUM 5000 UNIT(S): 5000 INJECTION INTRAVENOUS; SUBCUTANEOUS at 17:23

## 2022-08-02 RX ADMIN — SIMVASTATIN 20 MILLIGRAM(S): 20 TABLET, FILM COATED ORAL at 23:31

## 2022-08-02 RX ADMIN — PANTOPRAZOLE SODIUM 40 MILLIGRAM(S): 20 TABLET, DELAYED RELEASE ORAL at 06:38

## 2022-08-02 RX ADMIN — DONEPEZIL HYDROCHLORIDE 5 MILLIGRAM(S): 10 TABLET, FILM COATED ORAL at 23:31

## 2022-08-02 RX ADMIN — HEPARIN SODIUM 5000 UNIT(S): 5000 INJECTION INTRAVENOUS; SUBCUTANEOUS at 06:38

## 2022-08-02 RX ADMIN — Medication 3 MILLIGRAM(S): at 23:31

## 2022-08-02 NOTE — CHART NOTE - NSCHARTNOTEFT_GEN_A_CORE
Patient being seen for malnutrition follow up. Spoke with nursing staff and obtained subjective information from extensive chart review.     Current Diet : Diet, Pureed:   DASH/TLC {Sodium & Cholesterol Restricted} (DASH)  Mildly Thick Liquids (MILDTHICKLIQS) (07-26-22 @ 17:16)    PO intake:  < 50%     Current Weight: No current wts available  Height (cm): 172.7   Weight (kg): 64.7   BMI (kg/m2): 21.7     Nutrition Interval Events: Pt s/p another SLP evaluation 7/26 with diet deferred to MD. Per MD discussion with family, Pleasure Feeding has been now ordered with risk for aspiration discussed between MD and family. Family does not want enteral feeding for patient. Oral intake remains poor as per PCA. Will provide Vital Shakes (520 kcal, 22 gm protein per 8.5 oz) x 2/day which conform to mildly thick liquid guidelines, to enhance oral intake. No GI distress reported; last BM 8/1. No new weights to assess trend; pt with 1+ L/R leg and hand edema. Stage II L buttock pressure injury remains. Pt remains at severe risk for malnutrition based on continued physical evidence of muscle mass and fat loss. Encourage and monitor oral intake, especially of nutrition supplement. RDN services to remain available as needed.     __________________ Pertinent Medications__________________   MEDICATIONS  (STANDING):  dextrose 5%. 1000 milliLiter(s) (100 mL/Hr) IV Continuous <Continuous>  dextrose 5%. 1000 milliLiter(s) (50 mL/Hr) IV Continuous <Continuous>  dextrose 5%. 1000 milliLiter(s) (50 mL/Hr) IV Continuous <Continuous>  dextrose 5%. 1000 milliLiter(s) (100 mL/Hr) IV Continuous <Continuous>  dextrose 50% Injectable 25 Gram(s) IV Push once  dextrose 50% Injectable 12.5 Gram(s) IV Push once  dextrose 50% Injectable 25 Gram(s) IV Push once  dextrose 50% Injectable 25 Gram(s) IV Push once  dextrose 50% Injectable 12.5 Gram(s) IV Push once  dextrose 50% Injectable 25 Gram(s) IV Push once  donepezil 5 milliGRAM(s) Oral at bedtime  glucagon  Injectable 1 milliGRAM(s) IntraMuscular once  glucagon  Injectable 1 milliGRAM(s) IntraMuscular once  heparin   Injectable 5000 Unit(s) SubCutaneous every 12 hours  melatonin 3 milliGRAM(s) Oral at bedtime  pantoprazole   Suspension 40 milliGRAM(s) Oral before breakfast  simvastatin 20 milliGRAM(s) Oral at bedtime    MEDICATIONS  (PRN):  acetaminophen     Tablet .. 650 milliGRAM(s) Oral every 4 hours PRN Temp greater or equal to 38C (100.4F), Severe Pain (7 - 10)  ALBUTerol    90 MICROgram(s) HFA Inhaler 2 Puff(s) Inhalation every 6 hours PRN Bronchospasm  dextrose Oral Gel 15 Gram(s) Oral once PRN Blood Glucose LESS THAN 70 milliGRAM(s)/deciliter  dextrose Oral Gel 15 Gram(s) Oral once PRN Blood Glucose LESS THAN 70 milliGRAM(s)/deciliter  guaiFENesin Oral Liquid (Sugar-Free) 100 milliGRAM(s) Oral every 6 hours PRN Cough  metoprolol tartrate Injectable 5 milliGRAM(s) IV Push every 6 hours PRN sustained HR over 140 bpm      __________________ Pertinent Labs__________________    07-29 Phos 2.0 mg/dL<L> 07-12 Chol 146 mg/dL LDL --    HDL 62 mg/dL Trig 84 mg/dL        Skin: Stage II L buttocks    Estimated Needs:   [x] no change since previous assessment      Nutrition Diagnosis: Severe malnutrition  [x] ongoing    Goal(s):  1. Patient to meet > 75% estimated energy needs    Recommendations:   1. Will provide Vital Shakes (520 kcal, 22 gm protein per 8.5 oz) x 2/day.    Monitoring and Evaluation:   1. Monitor weights, labs, BMs, skin integrity, PO intake and edema.  2. RD services to remain available. Request obtaining new weight to assess trend.

## 2022-08-02 NOTE — PROGRESS NOTE ADULT - ASSESSMENT
89 year old male with dementia (AAO x 2 at baseline), Afib on eliquis, DM, HTN, [place], recently COVID-19+ on 7/10 s/p Dexamethasone and Remdesivir, presenting for AMS with worsening confusion and decreased responsiveness, found to be in septic shock secondary to aspiration pneumonia

## 2022-08-02 NOTE — PROGRESS NOTE ADULT - SUBJECTIVE AND OBJECTIVE BOX
Patient is a 89y old  Male who presents with a chief complaint of Increasing confusion, cold like symptoms, covid exposure (01 Aug 2022 13:37)    SUBJECTIVE / OVERNIGHT EVENTS: No acute events. Awake, not providing subjective.     MEDICATIONS  (STANDING):  dextrose 5%. 1000 milliLiter(s) (100 mL/Hr) IV Continuous <Continuous>  dextrose 5%. 1000 milliLiter(s) (50 mL/Hr) IV Continuous <Continuous>  dextrose 5%. 1000 milliLiter(s) (50 mL/Hr) IV Continuous <Continuous>  dextrose 5%. 1000 milliLiter(s) (100 mL/Hr) IV Continuous <Continuous>  dextrose 50% Injectable 25 Gram(s) IV Push once  dextrose 50% Injectable 12.5 Gram(s) IV Push once  dextrose 50% Injectable 25 Gram(s) IV Push once  dextrose 50% Injectable 25 Gram(s) IV Push once  dextrose 50% Injectable 12.5 Gram(s) IV Push once  dextrose 50% Injectable 25 Gram(s) IV Push once  donepezil 5 milliGRAM(s) Oral at bedtime  glucagon  Injectable 1 milliGRAM(s) IntraMuscular once  glucagon  Injectable 1 milliGRAM(s) IntraMuscular once  heparin   Injectable 5000 Unit(s) SubCutaneous every 12 hours  melatonin 3 milliGRAM(s) Oral at bedtime  pantoprazole   Suspension 40 milliGRAM(s) Oral before breakfast  simvastatin 20 milliGRAM(s) Oral at bedtime    MEDICATIONS  (PRN):  acetaminophen     Tablet .. 650 milliGRAM(s) Oral every 4 hours PRN Temp greater or equal to 38C (100.4F), Severe Pain (7 - 10)  ALBUTerol    90 MICROgram(s) HFA Inhaler 2 Puff(s) Inhalation every 6 hours PRN Bronchospasm  dextrose Oral Gel 15 Gram(s) Oral once PRN Blood Glucose LESS THAN 70 milliGRAM(s)/deciliter  dextrose Oral Gel 15 Gram(s) Oral once PRN Blood Glucose LESS THAN 70 milliGRAM(s)/deciliter  guaiFENesin Oral Liquid (Sugar-Free) 100 milliGRAM(s) Oral every 6 hours PRN Cough  metoprolol tartrate Injectable 5 milliGRAM(s) IV Push every 6 hours PRN sustained HR over 140 bpm    CAPILLARY BLOOD GLUCOSE    POCT Blood Glucose.: 148 mg/dL (02 Aug 2022 12:05)  POCT Blood Glucose.: 114 mg/dL (02 Aug 2022 07:25)  POCT Blood Glucose.: 123 mg/dL (01 Aug 2022 21:13)  POCT Blood Glucose.: 122 mg/dL (01 Aug 2022 16:40)    I&O's Summary    PHYSICAL EXAM:  Vital Signs Last 24 Hrs  T(C): 37.1 (02 Aug 2022 10:00), Max: 37.1 (02 Aug 2022 10:00)  T(F): 98.7 (02 Aug 2022 10:00), Max: 98.7 (02 Aug 2022 10:00)  HR: 74 (02 Aug 2022 10:00) (74 - 95)  BP: 127/57 (02 Aug 2022 10:00) (107/64 - 140/62)  BP(mean): --  RR: 18 (02 Aug 2022 10:00) (18 - 19)  SpO2: 100% (02 Aug 2022 10:00) (96% - 100%)    Parameters below as of 02 Aug 2022 10:00  Patient On (Oxygen Delivery Method): room air    CONSTITUTIONAL: NAD, laying in bed  ENMT: Moist oral mucosa, poor dentition  RESPIRATORY: no wheezing, faint bibasilar crackles  CARDIOVASCULAR: Regular rate and rhythm, normal S1 and S2, No lower extremity edema; Peripheral pulses are 2+ bilaterally  ABDOMEN: Nontender to palpation, normoactive bowel sounds, no rebound/guarding  SKIN: no rashes appreciated  PSYCH: Calm    LABS, Radiology Results Reviewed: Yes     COORDINATION OF CARE:  Care Discussed with Consultants/Other Providers [Y- Medicine ACP]

## 2022-08-02 NOTE — PROGRESS NOTE ADULT - PROBLEM SELECTOR PLAN 1
completed abx course with zosyn for aspiration PNA  -on NC 2L, recent covid infection 7/10 s/p rem/dex  -CT chest (7/27) Multifocal and bilateral tree-in-bud nodular opacities and patchy   consolidation compatible with aspiration or infection. Occluded LLL airways presumably secondary to secretions/mucous plugging and complete LLL collapse. Small bilateral pleural effusions.  -Chest PT  -Diet advanced to puree diet, pleasure feeds per family, advised family high risk of aspiration  -overall prognosis poor with multiple comorbidities, seen by palliative care, family okay with pleasure feeds and dc plan to LTC placement with transition to hospice  No plan for NGT or tube feed  -dispo: subacute rehab per PMR

## 2022-08-02 NOTE — PROGRESS NOTE ADULT - PROBLEM SELECTOR PLAN 2
# acute metabolic encephalopathy superimposed on baseline dementia, very hard of hearing  - Agitation 2/2 baseline dementia - haldol PRN when QTc is appropriate (<500ms)  - trial of iv thiamine as he appears malnourished  - avoid benzo/sedatives, frequent reorientation

## 2022-08-03 LAB
GLUCOSE BLDC GLUCOMTR-MCNC: 132 MG/DL — HIGH (ref 70–99)
GLUCOSE BLDC GLUCOMTR-MCNC: 135 MG/DL — HIGH (ref 70–99)
GLUCOSE BLDC GLUCOMTR-MCNC: 136 MG/DL — HIGH (ref 70–99)
GLUCOSE BLDC GLUCOMTR-MCNC: 142 MG/DL — HIGH (ref 70–99)
SARS-COV-2 RNA SPEC QL NAA+PROBE: DETECTED

## 2022-08-03 PROCEDURE — 99232 SBSQ HOSP IP/OBS MODERATE 35: CPT

## 2022-08-03 RX ADMIN — PANTOPRAZOLE SODIUM 40 MILLIGRAM(S): 20 TABLET, DELAYED RELEASE ORAL at 06:23

## 2022-08-03 RX ADMIN — Medication 3 MILLIGRAM(S): at 22:30

## 2022-08-03 RX ADMIN — DONEPEZIL HYDROCHLORIDE 5 MILLIGRAM(S): 10 TABLET, FILM COATED ORAL at 22:30

## 2022-08-03 RX ADMIN — HEPARIN SODIUM 5000 UNIT(S): 5000 INJECTION INTRAVENOUS; SUBCUTANEOUS at 19:54

## 2022-08-03 RX ADMIN — HEPARIN SODIUM 5000 UNIT(S): 5000 INJECTION INTRAVENOUS; SUBCUTANEOUS at 06:24

## 2022-08-03 RX ADMIN — SIMVASTATIN 20 MILLIGRAM(S): 20 TABLET, FILM COATED ORAL at 22:30

## 2022-08-03 NOTE — PROGRESS NOTE ADULT - SUBJECTIVE AND OBJECTIVE BOX
Patient is a 89y old  Male who presents with a chief complaint of Increasing confusion, cold like symptoms, covid exposure (02 Aug 2022 12:38)    SUBJECTIVE / OVERNIGHT EVENTS: No acute events. Easily arousable. Not providing detailed subjective.     MEDICATIONS  (STANDING):  dextrose 5%. 1000 milliLiter(s) (100 mL/Hr) IV Continuous <Continuous>  dextrose 5%. 1000 milliLiter(s) (50 mL/Hr) IV Continuous <Continuous>  dextrose 5%. 1000 milliLiter(s) (100 mL/Hr) IV Continuous <Continuous>  dextrose 5%. 1000 milliLiter(s) (50 mL/Hr) IV Continuous <Continuous>  dextrose 50% Injectable 25 Gram(s) IV Push once  dextrose 50% Injectable 12.5 Gram(s) IV Push once  dextrose 50% Injectable 25 Gram(s) IV Push once  dextrose 50% Injectable 25 Gram(s) IV Push once  dextrose 50% Injectable 12.5 Gram(s) IV Push once  dextrose 50% Injectable 25 Gram(s) IV Push once  donepezil 5 milliGRAM(s) Oral at bedtime  glucagon  Injectable 1 milliGRAM(s) IntraMuscular once  glucagon  Injectable 1 milliGRAM(s) IntraMuscular once  heparin   Injectable 5000 Unit(s) SubCutaneous every 12 hours  melatonin 3 milliGRAM(s) Oral at bedtime  pantoprazole   Suspension 40 milliGRAM(s) Oral before breakfast  simvastatin 20 milliGRAM(s) Oral at bedtime    MEDICATIONS  (PRN):  acetaminophen     Tablet .. 650 milliGRAM(s) Oral every 4 hours PRN Temp greater or equal to 38C (100.4F), Severe Pain (7 - 10)  ALBUTerol    90 MICROgram(s) HFA Inhaler 2 Puff(s) Inhalation every 6 hours PRN Bronchospasm  dextrose Oral Gel 15 Gram(s) Oral once PRN Blood Glucose LESS THAN 70 milliGRAM(s)/deciliter  dextrose Oral Gel 15 Gram(s) Oral once PRN Blood Glucose LESS THAN 70 milliGRAM(s)/deciliter  guaiFENesin Oral Liquid (Sugar-Free) 100 milliGRAM(s) Oral every 6 hours PRN Cough  metoprolol tartrate Injectable 5 milliGRAM(s) IV Push every 6 hours PRN sustained HR over 140 bpm    CAPILLARY BLOOD GLUCOSE    POCT Blood Glucose.: 135 mg/dL (03 Aug 2022 12:01)  POCT Blood Glucose.: 132 mg/dL (03 Aug 2022 07:51)  POCT Blood Glucose.: 126 mg/dL (02 Aug 2022 22:26)  POCT Blood Glucose.: 123 mg/dL (02 Aug 2022 17:18)    I&O's Summary    PHYSICAL EXAM:  Vital Signs Last 24 Hrs  T(C): 36.7 (03 Aug 2022 10:00), Max: 37.1 (02 Aug 2022 18:10)  T(F): 98.1 (03 Aug 2022 10:00), Max: 98.8 (02 Aug 2022 22:30)  HR: 79 (03 Aug 2022 10:00) (79 - 113)  BP: 121/64 (03 Aug 2022 10:00) (121/64 - 133/62)  BP(mean): --  RR: 18 (03 Aug 2022 10:00) (16 - 20)  SpO2: 100% (03 Aug 2022 10:00) (98% - 100%)    Parameters below as of 03 Aug 2022 10:00  Patient On (Oxygen Delivery Method): room air    CONSTITUTIONAL: NAD, laying in bed  ENMT: Moist oral mucosa, poor dentition  RESPIRATORY: no wheezing, faint bibasilar crackles  CARDIOVASCULAR: Regular rate and rhythm, normal S1 and S2, No lower extremity edema; Peripheral pulses are 2+ bilaterally  ABDOMEN: Nontender to palpation, normoactive bowel sounds, no rebound/guarding  SKIN: no rashes appreciated  PSYCH: Calm    LABS, Radiology Results-  Reviewed: Yes     COORDINATION OF CARE:  Care Discussed with Consultants/Other Providers [Y- Medicine ACP]

## 2022-08-03 NOTE — PROGRESS NOTE ADULT - ASSESSMENT
89 year old male with dementia (AAO x 2 at baseline), Afib on eliquis, DM, HTN, [place], recently COVID-19+ on 7/10 s/p Dexamethasone and Remdesivir, presenting for AMS with worsening confusion and decreased responsiveness, found to be in septic shock secondary to aspiration pneumonia.

## 2022-08-04 LAB
GLUCOSE BLDC GLUCOMTR-MCNC: 101 MG/DL — HIGH (ref 70–99)
GLUCOSE BLDC GLUCOMTR-MCNC: 158 MG/DL — HIGH (ref 70–99)
GLUCOSE BLDC GLUCOMTR-MCNC: 161 MG/DL — HIGH (ref 70–99)
GLUCOSE BLDC GLUCOMTR-MCNC: 221 MG/DL — HIGH (ref 70–99)

## 2022-08-04 PROCEDURE — 99232 SBSQ HOSP IP/OBS MODERATE 35: CPT

## 2022-08-04 RX ADMIN — SIMVASTATIN 20 MILLIGRAM(S): 20 TABLET, FILM COATED ORAL at 21:40

## 2022-08-04 RX ADMIN — HEPARIN SODIUM 5000 UNIT(S): 5000 INJECTION INTRAVENOUS; SUBCUTANEOUS at 07:12

## 2022-08-04 RX ADMIN — HEPARIN SODIUM 5000 UNIT(S): 5000 INJECTION INTRAVENOUS; SUBCUTANEOUS at 19:15

## 2022-08-04 RX ADMIN — DONEPEZIL HYDROCHLORIDE 5 MILLIGRAM(S): 10 TABLET, FILM COATED ORAL at 21:39

## 2022-08-04 RX ADMIN — PANTOPRAZOLE SODIUM 40 MILLIGRAM(S): 20 TABLET, DELAYED RELEASE ORAL at 07:12

## 2022-08-04 RX ADMIN — Medication 3 MILLIGRAM(S): at 21:39

## 2022-08-04 NOTE — PROGRESS NOTE ADULT - SUBJECTIVE AND OBJECTIVE BOX
Patient is a 89y old  Male who presents with a chief complaint of Increasing confusion, cold like symptoms, covid exposure (03 Aug 2022 13:40)    SUBJECTIVE / OVERNIGHT EVENTS: No acute events. Awake, not able to obtain detailed subjective.    MEDICATIONS  (STANDING):  dextrose 5%. 1000 milliLiter(s) (100 mL/Hr) IV Continuous <Continuous>  dextrose 5%. 1000 milliLiter(s) (50 mL/Hr) IV Continuous <Continuous>  dextrose 5%. 1000 milliLiter(s) (100 mL/Hr) IV Continuous <Continuous>  dextrose 5%. 1000 milliLiter(s) (50 mL/Hr) IV Continuous <Continuous>  dextrose 50% Injectable 25 Gram(s) IV Push once  dextrose 50% Injectable 12.5 Gram(s) IV Push once  dextrose 50% Injectable 25 Gram(s) IV Push once  dextrose 50% Injectable 25 Gram(s) IV Push once  dextrose 50% Injectable 12.5 Gram(s) IV Push once  dextrose 50% Injectable 25 Gram(s) IV Push once  donepezil 5 milliGRAM(s) Oral at bedtime  glucagon  Injectable 1 milliGRAM(s) IntraMuscular once  glucagon  Injectable 1 milliGRAM(s) IntraMuscular once  heparin   Injectable 5000 Unit(s) SubCutaneous every 12 hours  melatonin 3 milliGRAM(s) Oral at bedtime  pantoprazole   Suspension 40 milliGRAM(s) Oral before breakfast  simvastatin 20 milliGRAM(s) Oral at bedtime    MEDICATIONS  (PRN):  acetaminophen     Tablet .. 650 milliGRAM(s) Oral every 4 hours PRN Temp greater or equal to 38C (100.4F), Severe Pain (7 - 10)  ALBUTerol    90 MICROgram(s) HFA Inhaler 2 Puff(s) Inhalation every 6 hours PRN Bronchospasm  dextrose Oral Gel 15 Gram(s) Oral once PRN Blood Glucose LESS THAN 70 milliGRAM(s)/deciliter  dextrose Oral Gel 15 Gram(s) Oral once PRN Blood Glucose LESS THAN 70 milliGRAM(s)/deciliter  guaiFENesin Oral Liquid (Sugar-Free) 100 milliGRAM(s) Oral every 6 hours PRN Cough  metoprolol tartrate Injectable 5 milliGRAM(s) IV Push every 6 hours PRN sustained HR over 140 bpm    CAPILLARY BLOOD GLUCOSE    POCT Blood Glucose.: 221 mg/dL (04 Aug 2022 11:54)  POCT Blood Glucose.: 101 mg/dL (04 Aug 2022 07:42)  POCT Blood Glucose.: 136 mg/dL (03 Aug 2022 20:59)  POCT Blood Glucose.: 142 mg/dL (03 Aug 2022 17:37)    I&O's Summary      PHYSICAL EXAM:  Vital Signs Last 24 Hrs  T(C): 36.6 (04 Aug 2022 10:02), Max: 37.1 (03 Aug 2022 21:00)  T(F): 97.9 (04 Aug 2022 10:02), Max: 98.7 (03 Aug 2022 21:00)  HR: 78 (04 Aug 2022 10:02) (78 - 114)  BP: 101/49 (04 Aug 2022 10:02) (101/49 - 148/68)  BP(mean): --  RR: 18 (04 Aug 2022 10:02) (17 - 18)  SpO2: 100% (04 Aug 2022 10:02) (99% - 100%)    Parameters below as of 04 Aug 2022 10:02  Patient On (Oxygen Delivery Method): room air  CONSTITUTIONAL: NAD, laying in bed  ENMT: Moist oral mucosa, poor dentition  RESPIRATORY: no wheezing, no crackles  CARDIOVASCULAR: Regular rate and rhythm, normal S1 and S2, No lower extremity edema; Peripheral pulses are 2+ bilaterally  ABDOMEN: Nontender to palpation, normoactive bowel sounds, no rebound/guarding  SKIN: no rashes appreciated  PSYCH: Calm    LABS, Radiology Results-  Reviewed: Yes     COORDINATION OF CARE:  Care Discussed with Consultants/Other Providers [Y- Medicine ACP, SW/CM]

## 2022-08-04 NOTE — PROGRESS NOTE ADULT - NSPROGADDITIONALINFOA_GEN_ALL_CORE
Awaiting dispo to rehab, awaiting appeal process per SW/CM Awaiting dispo to rehab, awaiting appeal process per JERO/KEN  Son in law Zeinab updated 8/4

## 2022-08-04 NOTE — PROGRESS NOTE ADULT - ASSESSMENT
89 year old male with dementia (AAO x 2 at baseline), Afib on eliquis, DM, HTN, [place], recently COVID-19+ on 7/10 s/p Dexamethasone and Remdesivir, presenting for AMS with worsening confusion and decreased responsiveness, found to be in septic shock secondary to aspiration pneumonia.

## 2022-08-05 LAB
GLUCOSE BLDC GLUCOMTR-MCNC: 113 MG/DL — HIGH (ref 70–99)
GLUCOSE BLDC GLUCOMTR-MCNC: 120 MG/DL — HIGH (ref 70–99)
GLUCOSE BLDC GLUCOMTR-MCNC: 135 MG/DL — HIGH (ref 70–99)
GLUCOSE BLDC GLUCOMTR-MCNC: 149 MG/DL — HIGH (ref 70–99)
GLUCOSE BLDC GLUCOMTR-MCNC: 165 MG/DL — HIGH (ref 70–99)

## 2022-08-05 PROCEDURE — 99231 SBSQ HOSP IP/OBS SF/LOW 25: CPT

## 2022-08-05 RX ORDER — LANOLIN ALCOHOL/MO/W.PET/CERES
1 CREAM (GRAM) TOPICAL
Qty: 0 | Refills: 0 | DISCHARGE
Start: 2022-08-05

## 2022-08-05 RX ORDER — ALBUTEROL 90 UG/1
2 AEROSOL, METERED ORAL
Qty: 0 | Refills: 0 | DISCHARGE
Start: 2022-08-05

## 2022-08-05 RX ORDER — APIXABAN 2.5 MG/1
1 TABLET, FILM COATED ORAL
Qty: 0 | Refills: 0 | DISCHARGE

## 2022-08-05 RX ORDER — HEPARIN SODIUM 5000 [USP'U]/ML
5000 INJECTION INTRAVENOUS; SUBCUTANEOUS
Qty: 0 | Refills: 0 | DISCHARGE
Start: 2022-08-05

## 2022-08-05 RX ADMIN — HEPARIN SODIUM 5000 UNIT(S): 5000 INJECTION INTRAVENOUS; SUBCUTANEOUS at 05:53

## 2022-08-05 RX ADMIN — SIMVASTATIN 20 MILLIGRAM(S): 20 TABLET, FILM COATED ORAL at 21:43

## 2022-08-05 RX ADMIN — HEPARIN SODIUM 5000 UNIT(S): 5000 INJECTION INTRAVENOUS; SUBCUTANEOUS at 19:23

## 2022-08-05 RX ADMIN — DONEPEZIL HYDROCHLORIDE 5 MILLIGRAM(S): 10 TABLET, FILM COATED ORAL at 21:44

## 2022-08-05 RX ADMIN — PANTOPRAZOLE SODIUM 40 MILLIGRAM(S): 20 TABLET, DELAYED RELEASE ORAL at 05:53

## 2022-08-05 RX ADMIN — Medication 3 MILLIGRAM(S): at 21:43

## 2022-08-05 NOTE — PROGRESS NOTE ADULT - NSPROGADDITIONALINFOA_GEN_ALL_CORE
Continues to await dispo to rehab, awaiting appeal process per JERO/KEN  Son in law Zeinab updated 8/4, understands waiting placement, will continue to update periodically or sooner for acute medical changes

## 2022-08-05 NOTE — PROGRESS NOTE ADULT - ASSESSMENT
89 year old male with dementia (AAO x 2 at baseline), Afib on eliquis, DM, HTN, [place], recently COVID-19+ on 7/10 s/p Dexamethasone and Remdesivir, presenting for AMS with worsening confusion and decreased responsiveness, found to be in septic shock secondary to aspiration pneumonia.

## 2022-08-05 NOTE — PROGRESS NOTE ADULT - SUBJECTIVE AND OBJECTIVE BOX
Patient is a 89y old  Male who presents with a chief complaint of Increasing confusion, cold like symptoms, covid exposure (04 Aug 2022 13:23)    SUBJECTIVE / OVERNIGHT EVENTS: No acute events. Awake, in no acute distress, not providing subjective.     MEDICATIONS  (STANDING):  dextrose 5%. 1000 milliLiter(s) (100 mL/Hr) IV Continuous <Continuous>  dextrose 5%. 1000 milliLiter(s) (50 mL/Hr) IV Continuous <Continuous>  dextrose 5%. 1000 milliLiter(s) (100 mL/Hr) IV Continuous <Continuous>  dextrose 5%. 1000 milliLiter(s) (50 mL/Hr) IV Continuous <Continuous>  dextrose 50% Injectable 25 Gram(s) IV Push once  dextrose 50% Injectable 12.5 Gram(s) IV Push once  dextrose 50% Injectable 25 Gram(s) IV Push once  dextrose 50% Injectable 25 Gram(s) IV Push once  dextrose 50% Injectable 12.5 Gram(s) IV Push once  dextrose 50% Injectable 25 Gram(s) IV Push once  donepezil 5 milliGRAM(s) Oral at bedtime  glucagon  Injectable 1 milliGRAM(s) IntraMuscular once  glucagon  Injectable 1 milliGRAM(s) IntraMuscular once  heparin   Injectable 5000 Unit(s) SubCutaneous every 12 hours  melatonin 3 milliGRAM(s) Oral at bedtime  pantoprazole   Suspension 40 milliGRAM(s) Oral before breakfast  simvastatin 20 milliGRAM(s) Oral at bedtime    MEDICATIONS  (PRN):  acetaminophen     Tablet .. 650 milliGRAM(s) Oral every 4 hours PRN Temp greater or equal to 38C (100.4F), Severe Pain (7 - 10)  ALBUTerol    90 MICROgram(s) HFA Inhaler 2 Puff(s) Inhalation every 6 hours PRN Bronchospasm  dextrose Oral Gel 15 Gram(s) Oral once PRN Blood Glucose LESS THAN 70 milliGRAM(s)/deciliter  dextrose Oral Gel 15 Gram(s) Oral once PRN Blood Glucose LESS THAN 70 milliGRAM(s)/deciliter  guaiFENesin Oral Liquid (Sugar-Free) 100 milliGRAM(s) Oral every 6 hours PRN Cough  metoprolol tartrate Injectable 5 milliGRAM(s) IV Push every 6 hours PRN sustained HR over 140 bpm      CAPILLARY BLOOD GLUCOSE      POCT Blood Glucose.: 149 mg/dL (05 Aug 2022 17:09)  POCT Blood Glucose.: 113 mg/dL (05 Aug 2022 12:17)  POCT Blood Glucose.: 120 mg/dL (05 Aug 2022 09:08)  POCT Blood Glucose.: 135 mg/dL (05 Aug 2022 07:38)  POCT Blood Glucose.: 161 mg/dL (04 Aug 2022 20:40)    I&O's Summary      PHYSICAL EXAM:  Vital Signs Last 24 Hrs  T(C): 37.1 (05 Aug 2022 09:35), Max: 37.4 (04 Aug 2022 21:02)  T(F): 98.8 (05 Aug 2022 09:35), Max: 99.3 (04 Aug 2022 21:02)  HR: 117 (05 Aug 2022 09:35) (100 - 117)  BP: 135/67 (05 Aug 2022 09:35) (120/81 - 153/60)  BP(mean): --  RR: 18 (05 Aug 2022 09:35) (18 - 18)  SpO2: 97% (05 Aug 2022 09:35) (94% - 100%)    Parameters below as of 05 Aug 2022 09:35  Patient On (Oxygen Delivery Method): room air    CONSTITUTIONAL: NAD, laying in bed inclined  ENMT: Moist oral mucosa, poor dentition  RESPIRATORY: no wheezing, no crackles  CARDIOVASCULAR: Regular rate and rhythm, normal S1 and S2, No lower extremity edema; Peripheral pulses are 2+ bilaterally  ABDOMEN: Nontender to palpation, normoactive bowel sounds, no rebound/guarding  SKIN: no rashes   PSYCH: Calm    LABS, Radiology Results-  Reviewed: Yes     COORDINATION OF CARE:  Care Discussed with Consultants/Other Providers [Y- Medicine ACP]

## 2022-08-06 LAB — GLUCOSE BLDC GLUCOMTR-MCNC: 125 MG/DL — HIGH (ref 70–99)

## 2022-08-06 PROCEDURE — 99231 SBSQ HOSP IP/OBS SF/LOW 25: CPT

## 2022-08-06 RX ADMIN — Medication 3 MILLIGRAM(S): at 22:22

## 2022-08-06 RX ADMIN — PANTOPRAZOLE SODIUM 40 MILLIGRAM(S): 20 TABLET, DELAYED RELEASE ORAL at 05:44

## 2022-08-06 RX ADMIN — DONEPEZIL HYDROCHLORIDE 5 MILLIGRAM(S): 10 TABLET, FILM COATED ORAL at 22:22

## 2022-08-06 RX ADMIN — HEPARIN SODIUM 5000 UNIT(S): 5000 INJECTION INTRAVENOUS; SUBCUTANEOUS at 17:56

## 2022-08-06 RX ADMIN — HEPARIN SODIUM 5000 UNIT(S): 5000 INJECTION INTRAVENOUS; SUBCUTANEOUS at 05:44

## 2022-08-06 RX ADMIN — SIMVASTATIN 20 MILLIGRAM(S): 20 TABLET, FILM COATED ORAL at 22:22

## 2022-08-06 NOTE — PROGRESS NOTE ADULT - ASSESSMENT
89 year old male with dementia (AAO x 2 at baseline), Afib on eliquis, DM, HTN, [place], recently COVID-19+ on 7/10 s/p Dexamethasone and Remdesivir, presenting for AMS with worsening confusion and decreased responsiveness, found to be in septic shock secondary to aspiration pneumonia.

## 2022-08-06 NOTE — PROGRESS NOTE ADULT - SUBJECTIVE AND OBJECTIVE BOX
Patient is a 89y old  Male who presents with a chief complaint of Increasing confusion, cold like symptoms, covid exposure (05 Aug 2022 17:30)    SUBJECTIVE / OVERNIGHT EVENTS: No acute events. Not providing subjective this morning.     MEDICATIONS  (STANDING):  donepezil 5 milliGRAM(s) Oral at bedtime  heparin   Injectable 5000 Unit(s) SubCutaneous every 12 hours  melatonin 3 milliGRAM(s) Oral at bedtime  pantoprazole   Suspension 40 milliGRAM(s) Oral before breakfast  simvastatin 20 milliGRAM(s) Oral at bedtime    MEDICATIONS  (PRN):  acetaminophen     Tablet .. 650 milliGRAM(s) Oral every 4 hours PRN Temp greater or equal to 38C (100.4F), Severe Pain (7 - 10)  ALBUTerol    90 MICROgram(s) HFA Inhaler 2 Puff(s) Inhalation every 6 hours PRN Bronchospasm  guaiFENesin Oral Liquid (Sugar-Free) 100 milliGRAM(s) Oral every 6 hours PRN Cough  metoprolol tartrate Injectable 5 milliGRAM(s) IV Push every 6 hours PRN sustained HR over 140 bpm    CAPILLARY BLOOD GLUCOSE    POCT Blood Glucose.: 125 mg/dL (06 Aug 2022 07:30)  POCT Blood Glucose.: 165 mg/dL (05 Aug 2022 21:19)  POCT Blood Glucose.: 149 mg/dL (05 Aug 2022 17:09)    I&O's Summary    PHYSICAL EXAM:  Vital Signs Last 24 Hrs  T(C): 36.8 (06 Aug 2022 05:45), Max: 36.8 (06 Aug 2022 05:45)  T(F): 98.2 (06 Aug 2022 05:45), Max: 98.2 (06 Aug 2022 05:45)  HR: 103 (06 Aug 2022 05:45) (99 - 103)  BP: 131/65 (06 Aug 2022 05:45) (131/65 - 132/71)  BP(mean): --  RR: 18 (06 Aug 2022 05:45) (18 - 19)  SpO2: 98% (06 Aug 2022 05:45) (98% - 100%)    Parameters below as of 06 Aug 2022 05:45  Patient On (Oxygen Delivery Method): room air    CONSTITUTIONAL: NAD, inclined in bed  ENMT: Moist oral mucosa, poor dentition  RESPIRATORY: no wheezing, no crackles  CARDIOVASCULAR: Regular rate and rhythm, normal S1 and S2, No lower extremity edema; Peripheral pulses are 2+ bilaterally  ABDOMEN: Nontender to palpation, normoactive bowel sounds, no rebound/guarding  SKIN: no rashes   PSYCH: Calm    LABS, Radiology Results-  Reviewed: Yes     COORDINATION OF CARE:  Care Discussed with Consultants/Other Providers [Y- Medicine ACP]

## 2022-08-07 LAB — SARS-COV-2 RNA SPEC QL NAA+PROBE: DETECTED

## 2022-08-07 PROCEDURE — 99231 SBSQ HOSP IP/OBS SF/LOW 25: CPT

## 2022-08-07 RX ADMIN — HEPARIN SODIUM 5000 UNIT(S): 5000 INJECTION INTRAVENOUS; SUBCUTANEOUS at 18:09

## 2022-08-07 RX ADMIN — HEPARIN SODIUM 5000 UNIT(S): 5000 INJECTION INTRAVENOUS; SUBCUTANEOUS at 05:51

## 2022-08-07 RX ADMIN — Medication 3 MILLIGRAM(S): at 22:40

## 2022-08-07 RX ADMIN — SIMVASTATIN 20 MILLIGRAM(S): 20 TABLET, FILM COATED ORAL at 22:40

## 2022-08-07 RX ADMIN — PANTOPRAZOLE SODIUM 40 MILLIGRAM(S): 20 TABLET, DELAYED RELEASE ORAL at 05:51

## 2022-08-07 NOTE — PROGRESS NOTE ADULT - PROVIDER SPECIALTY LIST ADULT
Dr. Patton viewed the picture and said it does look like a chalazion and and we sent electonic Rx for doxycycline.    
MICU
MICU
Hospitalist
Hospitalist
MICU
Hospitalist

## 2022-08-07 NOTE — PROGRESS NOTE ADULT - ASSESSMENT
89 year old male with dementia (AAO x 2 at baseline), Afib on eliquis, DM, HTN, [place], recently COVID-19+ on 7/10 s/p Dexamethasone and Remdesivir, presenting for AMS with worsening confusion and decreased responsiveness, found to be in septic shock secondary to aspiration pneumonia.

## 2022-08-07 NOTE — PROGRESS NOTE ADULT - SUBJECTIVE AND OBJECTIVE BOX
[facility] Division of Hospital Medicine  Mary Ann Winkler MD  Pager #[phone]    Patient is a 89y old  Male who presents with a chief complaint of Increasing confusion, cold like symptoms, covid exposure (06 Aug 2022 12:55)    SUBJECTIVE / OVERNIGHT EVENTS: No acute events. Awake, NAD, not providing subjective.     MEDICATIONS  (STANDING):  donepezil 5 milliGRAM(s) Oral at bedtime  heparin   Injectable 5000 Unit(s) SubCutaneous every 12 hours  melatonin 3 milliGRAM(s) Oral at bedtime  pantoprazole   Suspension 40 milliGRAM(s) Oral before breakfast  simvastatin 20 milliGRAM(s) Oral at bedtime    MEDICATIONS  (PRN):  acetaminophen     Tablet .. 650 milliGRAM(s) Oral every 4 hours PRN Temp greater or equal to 38C (100.4F), Severe Pain (7 - 10)  ALBUTerol    90 MICROgram(s) HFA Inhaler 2 Puff(s) Inhalation every 6 hours PRN Bronchospasm  guaiFENesin Oral Liquid (Sugar-Free) 100 milliGRAM(s) Oral every 6 hours PRN Cough  metoprolol tartrate Injectable 5 milliGRAM(s) IV Push every 6 hours PRN sustained HR over 140 bpm    CAPILLARY BLOOD GLUCOSE    I&O's Summary    PHYSICAL EXAM:  Vital Signs Last 24 Hrs  T(C): 36.7 (07 Aug 2022 06:00), Max: 36.7 (07 Aug 2022 06:00)  T(F): 98.1 (07 Aug 2022 06:00), Max: 98.1 (07 Aug 2022 06:00)  HR: 96 (07 Aug 2022 06:00) (93 - 96)  BP: 123/75 (07 Aug 2022 06:00) (116/61 - 143/60)  BP(mean): --  RR: 19 (07 Aug 2022 06:00) (17 - 19)  SpO2: 100% (07 Aug 2022 06:00) (98% - 100%)    Parameters below as of 07 Aug 2022 06:00  Patient On (Oxygen Delivery Method): room air      CONSTITUTIONAL: NAD, laying in bed  ENMT: Moist oral mucosa, poor dentition  RESPIRATORY: no wheezing, no crackles  CARDIOVASCULAR: Regular rate and rhythm, normal S1 and S2, No lower extremity edema; Peripheral pulses are 2+ bilaterally  ABDOMEN: Nontender to palpation, normoactive bowel sounds, no rebound/guarding  SKIN: no rashes   PSYCH: Calm    LABS, Radiology Results-  Reviewed: Yes

## 2022-08-07 NOTE — PROGRESS NOTE ADULT - PROBLEM SELECTOR PROBLEM 1
Pneumonia, aspiration
Pneumonia, aspiration
Hypernatremia
Hypernatremia
COVID-19
Pneumonia, aspiration
Hypernatremia
Pneumonia, aspiration
Acute metabolic encephalopathy
Pneumonia, aspiration
COVID-19
Hypernatremia
COVID-19
COVID-19
Pneumonia, aspiration
COVID-19
Pneumonia, aspiration
Hypernatremia

## 2022-08-07 NOTE — PROGRESS NOTE ADULT - REASON FOR ADMISSION
Increasing confusion, cold like symptoms, covid exposure
COVID
Increasing confusion, cold like symptoms, covid exposure

## 2022-08-07 NOTE — PROGRESS NOTE ADULT - NUTRITIONAL ASSESSMENT
This patient has been assessed with a concern for Malnutrition and has been determined to have a diagnosis/diagnoses of Severe protein-calorie malnutrition.    This patient is being managed with:   Diet NPO-  Except Medications  Entered: Jul 24 2022  9:08AM    
This patient has been assessed with a concern for Malnutrition and has been determined to have a diagnosis/diagnoses of Severe protein-calorie malnutrition.    This patient is being managed with:   Diet Pureed-  DASH/TLC {Sodium & Cholesterol Restricted} (DASH)  Mildly Thick Liquids (MILDTHICKLIQS)  Entered: Jul 26 2022  5:16PM    
This patient has been assessed with a concern for Malnutrition and has been determined to have a diagnosis/diagnoses of Severe protein-calorie malnutrition.    This patient is being managed with:   Diet Pureed-  DASH/TLC {Sodium & Cholesterol Restricted} (DASH)  Mildly Thick Liquids (MILDTHICKLIQS)  Entered: Jul 26 2022  5:16PM    
This patient has been assessed with a concern for Malnutrition and has been determined to have a diagnosis/diagnoses of Severe protein-calorie malnutrition.    This patient is being managed with:   Diet Pureed-  DASH/TLC {Sodium & Cholesterol Restricted} (DASH)  Mildly Thick Liquids (MILDTHICKLIQS)  Supplement Feeding Modality:  Oral  Glucerna Shake Cans or Servings Per Day:  1       Frequency:  Two Times a day  Entered: Jul 18 2022 11:21AM    
This patient has been assessed with a concern for Malnutrition and has been determined to have a diagnosis/diagnoses of Severe protein-calorie malnutrition.    This patient is being managed with:   Diet NPO-  Entered: Jul 24 2022  7:45PM    
This patient has been assessed with a concern for Malnutrition and has been determined to have a diagnosis/diagnoses of Severe protein-calorie malnutrition.    This patient is being managed with:   Diet Pureed-  DASH/TLC {Sodium & Cholesterol Restricted} (DASH)  Mildly Thick Liquids (MILDTHICKLIQS)  Entered: Jul 26 2022  5:16PM    
This patient has been assessed with a concern for Malnutrition and has been determined to have a diagnosis/diagnoses of Severe protein-calorie malnutrition.    This patient is being managed with:   Diet DASH/TLC-  Sodium & Cholesterol Restricted  Consistent Carbohydrate {No Snacks} (CSTCHO)  Pureed (PUREED)  Supplement Feeding Modality:  Oral  Glucerna Shake Cans or Servings Per Day:  1       Frequency:  Two Times a day  Entered: Jul 13 2022  2:26PM    
This patient has been assessed with a concern for Malnutrition and has been determined to have a diagnosis/diagnoses of Severe protein-calorie malnutrition.    This patient is being managed with:   Diet DASH/TLC-  Sodium & Cholesterol Restricted  Consistent Carbohydrate {No Snacks} (CSTCHO)  Pureed (PUREED)  Supplement Feeding Modality:  Oral  Glucerna Shake Cans or Servings Per Day:  1       Frequency:  Two Times a day  Entered: Jul 13 2022  2:26PM    
This patient has been assessed with a concern for Malnutrition and has been determined to have a diagnosis/diagnoses of Severe protein-calorie malnutrition.      
This patient has been assessed with a concern for Malnutrition and has been determined to have a diagnosis/diagnoses of Severe protein-calorie malnutrition.    This patient is being managed with:   Diet Pureed-  DASH/TLC {Sodium & Cholesterol Restricted} (DASH)  Mildly Thick Liquids (MILDTHICKLIQS)  Entered: Jul 26 2022  5:16PM    
This patient has been assessed with a concern for Malnutrition and has been determined to have a diagnosis/diagnoses of Severe protein-calorie malnutrition.    This patient is being managed with:   Diet Pureed-  DASH/TLC {Sodium & Cholesterol Restricted} (DASH)  Mildly Thick Liquids (MILDTHICKLIQS)  Entered: Jul 26 2022  5:16PM    
This patient has been assessed with a concern for Malnutrition and has been determined to have a diagnosis/diagnoses of Severe protein-calorie malnutrition.    This patient is being managed with:   Diet Pureed-  DASH/TLC {Sodium & Cholesterol Restricted} (DASH)  Mildly Thick Liquids (MILDTHICKLIQS)  Supplement Feeding Modality:  Oral  Glucerna Shake Cans or Servings Per Day:  1       Frequency:  Two Times a day  Entered: Jul 18 2022 11:21AM    
This patient has been assessed with a concern for Malnutrition and has been determined to have a diagnosis/diagnoses of Severe protein-calorie malnutrition.    This patient is being managed with:   Diet Pureed-  DASH/TLC {Sodium & Cholesterol Restricted} (DASH)  Mildly Thick Liquids (MILDTHICKLIQS)  Entered: Jul 26 2022  5:16PM    
This patient has been assessed with a concern for Malnutrition and has been determined to have a diagnosis/diagnoses of Severe protein-calorie malnutrition.    This patient is being managed with:   Diet Pureed-  DASH/TLC {Sodium & Cholesterol Restricted} (DASH)  Mildly Thick Liquids (MILDTHICKLIQS)  Entered: Jul 26 2022  5:16PM    
This patient has been assessed with a concern for Malnutrition and has been determined to have a diagnosis/diagnoses of Severe protein-calorie malnutrition.    This patient is being managed with:   Diet Pureed-  DASH/TLC {Sodium & Cholesterol Restricted} (DASH)  Mildly Thick Liquids (MILDTHICKLIQS)  Supplement Feeding Modality:  Oral  Glucerna Shake Cans or Servings Per Day:  1       Frequency:  Two Times a day  Entered: Jul 18 2022 11:21AM    
This patient has been assessed with a concern for Malnutrition and has been determined to have a diagnosis/diagnoses of Severe protein-calorie malnutrition.    This patient is being managed with:   Diet DASH/TLC-  Sodium & Cholesterol Restricted  Consistent Carbohydrate {No Snacks} (CSTCHO)  Pureed (PUREED)  Supplement Feeding Modality:  Oral  Glucerna Shake Cans or Servings Per Day:  1       Frequency:  Two Times a day  Entered: Jul 13 2022  2:26PM    
This patient has been assessed with a concern for Malnutrition and has been determined to have a diagnosis/diagnoses of Severe protein-calorie malnutrition.    This patient is being managed with:   Diet Pureed-  DASH/TLC {Sodium & Cholesterol Restricted} (DASH)  Mildly Thick Liquids (MILDTHICKLIQS)  Entered: Jul 26 2022  5:16PM    
This patient has been assessed with a concern for Malnutrition and has been determined to have a diagnosis/diagnoses of Severe protein-calorie malnutrition.    This patient is being managed with:   Diet Pureed-  DASH/TLC {Sodium & Cholesterol Restricted} (DASH)  Mildly Thick Liquids (MILDTHICKLIQS)  Entered: Jul 26 2022  5:16PM    
This patient has been assessed with a concern for Malnutrition and has been determined to have a diagnosis/diagnoses of Severe protein-calorie malnutrition.    This patient is being managed with:   Diet NPO-  Entered: Jul 24 2022  7:45PM    
This patient has been assessed with a concern for Malnutrition and has been determined to have a diagnosis/diagnoses of Severe protein-calorie malnutrition.    This patient is being managed with:   Diet Pureed-  DASH/TLC {Sodium & Cholesterol Restricted} (DASH)  Mildly Thick Liquids (MILDTHICKLIQS)  Entered: Jul 26 2022  5:16PM    
This patient has been assessed with a concern for Malnutrition and has been determined to have a diagnosis/diagnoses of Severe protein-calorie malnutrition.    This patient is being managed with:   Diet Pureed-  DASH/TLC {Sodium & Cholesterol Restricted} (DASH)  Mildly Thick Liquids (MILDTHICKLIQS)  Entered: Jul 26 2022  5:16PM    
This patient has been assessed with a concern for Malnutrition and has been determined to have a diagnosis/diagnoses of Severe protein-calorie malnutrition.    This patient is being managed with:   Diet Pureed-  DASH/TLC {Sodium & Cholesterol Restricted} (DASH)  Mildly Thick Liquids (MILDTHICKLIQS)  Supplement Feeding Modality:  Oral  Glucerna Shake Cans or Servings Per Day:  1       Frequency:  Two Times a day  Entered: Jul 18 2022 11:21AM

## 2022-08-08 ENCOUNTER — TRANSCRIPTION ENCOUNTER (OUTPATIENT)
Age: 87
End: 2022-08-08

## 2022-08-08 VITALS
DIASTOLIC BLOOD PRESSURE: 75 MMHG | RESPIRATION RATE: 16 BRPM | OXYGEN SATURATION: 100 % | HEART RATE: 92 BPM | TEMPERATURE: 98 F | SYSTOLIC BLOOD PRESSURE: 149 MMHG

## 2022-08-08 PROCEDURE — 99239 HOSP IP/OBS DSCHRG MGMT >30: CPT

## 2022-08-08 RX ORDER — LOSARTAN POTASSIUM 100 MG/1
1 TABLET, FILM COATED ORAL
Qty: 0 | Refills: 0 | DISCHARGE

## 2022-08-08 RX ADMIN — PANTOPRAZOLE SODIUM 40 MILLIGRAM(S): 20 TABLET, DELAYED RELEASE ORAL at 06:45

## 2022-08-08 RX ADMIN — HEPARIN SODIUM 5000 UNIT(S): 5000 INJECTION INTRAVENOUS; SUBCUTANEOUS at 06:45

## 2022-08-08 NOTE — CHART NOTE - NSCHARTNOTEFT_GEN_A_CORE
Source: Patient A&Ox [1], Comprehensive chart review.    Medical course: 89 year old male with dementia (AAO x 2 at baseline), Afib on eliquis, DM, HTN, [place], recently COVID-19+ on 7/10 s/p Dexamethasone and Remdesivir, presenting for AMS with worsening confusion and decreased responsiveness, found to be in septic shock secondary to aspiration pneumonia.      Nutrition course: Patient seen at bedside, appears lethargic and disoriented. Comprehensive chart review completed.  Patient continues on pleasure feeds with poor PO intake, consumes 0-25% of food intake per RN flowsheet. Recommend liberalize diet w/o therapeutic restrictions. Per isabell note, patient currently has no plan for NGT/TF. Labs notable with low H/H  9.4/30L, low Na 133L, low potassium 3.4L, and Phos 2.0L, potassium chloride and potassium phosphate ordered and completed on 7/27, will cont monitor. Skin remains impaired with stage II left buttock pressure injury. 1+ edema to B/L UE, and 2+ B/L feet per RN flowsheet, which is making weight loss. Patient's adm weight -142.1lbs, current weight via bedscale today (8/8)-138.16, wt trend reflects significant weight loss of 4lbs /6% in 1 month. Patient has been provided with Vital shake 2x daily (520 kcal, 22 gm protein per 8.5 oz) with 25% PO intake. Will additionally provide Magic Cup 2x daily (580kcal, 18gm pro) to optimize pt's nutrition intake       Diet, Pureed:   DASH/TLC {Sodium & Cholesterol Restricted} (DASH)  Mildly Thick Liquids (MILDTHICKLIQS) (07-26-22 @ 17:16)      GI: WDL. Last BM     PO intake:  < 50% [ ] 50-75% [ ]   % [ ]  other :    Enteral /Parenteral Nutrition:     Anthropometrics: Height (cm): 172.7 (07-22)  Weight (kg): 64.7 (07-12)  BMI (kg/m2): 21.7 (07-22)    Edema:  Pressure Injuries:    __________________ Pertinent Medications__________________   MEDICATIONS  (STANDING):  donepezil 5 milliGRAM(s) Oral at bedtime  heparin   Injectable 5000 Unit(s) SubCutaneous every 12 hours  melatonin 3 milliGRAM(s) Oral at bedtime  pantoprazole   Suspension 40 milliGRAM(s) Oral before breakfast  simvastatin 20 milliGRAM(s) Oral at bedtime    MEDICATIONS  (PRN):  acetaminophen     Tablet .. 650 milliGRAM(s) Oral every 4 hours PRN Temp greater or equal to 38C (100.4F), Severe Pain (7 - 10)  ALBUTerol    90 MICROgram(s) HFA Inhaler 2 Puff(s) Inhalation every 6 hours PRN Bronchospasm  guaiFENesin Oral Liquid (Sugar-Free) 100 milliGRAM(s) Oral every 6 hours PRN Cough  metoprolol tartrate Injectable 5 milliGRAM(s) IV Push every 6 hours PRN sustained HR over 140 bpm      __________________ Pertinent Labs__________________    07-12 Chol 146 mg/dL LDL --    HDL 62 mg/dL Trig 84 mg/dL        POCT Blood Glucose.: 125 mg/dL (08-06-22 @ 07:30)  POCT Blood Glucose.: 165 mg/dL (08-05-22 @ 21:19)  POCT Blood Glucose.: 149 mg/dL (08-05-22 @ 17:09)          Estimated Needs:   [x ] no change since previous assessment        Previous Nutrition Diagnosis: Malnutrition     Nutrition Diagnosis is [ x] ongoing       Recommendations:  1) Recommend liberalize diet w/o any therapeutic restrictions to encourage PO intake.   2) Continue to provide Vital shake (580kcal, 20gm pro). Will additionally provide Magic Cup 2x daily (580kcal, 18gm pro).   3) Continue to provide total assistance and encouragement at each meal.   3) Monitor Labs (electrolytes) and FS, adjust insulin regimen as needed.   4) Recommend add MVI and Vitamin C to promote wound healing.         Monitoring and Evaluation:      [ x] Tolerance to diet prescription [x ] weights [x ] follow up per protocol  [ ] other:

## 2022-08-08 NOTE — DISCHARGE NOTE NURSING/CASE MANAGEMENT/SOCIAL WORK - NSDCPEFALRISK_GEN_ALL_CORE
For information on Fall & Injury Prevention, visit: [email or web address] OR  [email or web address] OR  https://www.cdc.gov/steadi/patient.html

## 2022-08-08 NOTE — CHART NOTE - NSCHARTNOTEFT_GEN_A_CORE
pt seen and examined by me  plan for dc to rehab with transition to hospice  VSS  a/w metabolic encep due to asp pna, recent COVID infection July 2022  s/p ICU stay requiring pressor support  pt now DNR/I, comfort care  completed abx  CHEST b/l AE  CV no edema  medically stable for dc  32 min spent with dc planning

## 2022-08-08 NOTE — DISCHARGE NOTE NURSING/CASE MANAGEMENT/SOCIAL WORK - PATIENT PORTAL LINK FT
You can access the FollowMyHealth Patient Portal offered by [facility] by registering at the following website: [email or web address]. By joining [facility]’s FollowMyHealth portal, you will also be able to view your health information using other applications (apps) compatible with our system.

## 2022-08-08 NOTE — CHART NOTE - NSCHARTNOTESELECT_GEN_ALL_CORE
ACP/Event Note
Event Note
Follow Up/Nutrition Services
MAR Accept/Event Note
MICU POCUS NOTE
MICU Transfer Note
Nutrition Follow Up/Nutrition Services
Nutrition Services
ACP/Event Note
Event Note
Follow Up/Nutrition Services
Hyperglycemia/Event Note
MICU POCUS NOTE
Ultrasound Resident Note/Event Note
endocrinology/Event Note
micu accept note/Transfer Note

## 2023-05-25 NOTE — PROVIDER CONTACT NOTE (OTHER) - BACKGROUND
Admitting dx: AMS, COVID-19 positive. PMH: afib on eliquis, DM, HTN, [place].
Patient admitted with altered mental status. Hx of afib, DM2, HTn, dementia
Pt admitted for altered mental status. Past medical history of dementia. Found to be covid positive.
Admitting dx: AMS, COVID-19 positive. PMH: afib on eliquis, DM, HTN, [place].
Admitting dx: AMS, COVID-19 positive. PMH: afib on eliquis, DM, HTN, [place].
Patient admitted with altered mental status. Hx of afib, DM2, HTn, dementia
Patient came for altered mental status and covid19. Hx of dementia, HTN, DM
Patient came for altered mental status and covid19. Hx of dementia, HTN, DM, Afib
Patient came for altered mental status and covid19. Hx of dementia, HTN, DM, Afib
Pt admitted for altered mental status, found to be covid positive. Past medical history of dementia, afib.
Admitting dx: AMS, COVID-19 positive. PMH: afib on eliquis, DM, HTN, [place].
patient has history of htn, patient has been hypertensive all day
Admitting dx: AMS, COVID-19 positive. PMH: afib on eliquis, DM, HTN, [place]. Currently NPO, pending S$s
Admitting dx: AMS, COVID-19 positive. PMH: afib on eliquis, DM, HTN, [place]. Currently NPO, pending S$s
Patient admitted for altered mental status. PMH DM, afib, HTN.
Patient admitted with altered mental status. Hx of afib, DM2, HTn, dementia
patient has history of htn, hypertensive throughout day
Admitting dx: AMS, COVID-19 positive. PMH: afib on eliquis, DM, HTN, [place].
Patient admitted with altered mental status. Hx of afib, DM2, HTn, dementia
Patient admitted with altered mental status. Hx of afib, DM2, HTn, dementia
PT admitted for altered mental status, found to be covid positive.
Patient admitted with altered mental status. Hx of afib, DM2, HTn, dementia
stretcher

## 2023-08-09 NOTE — PROGRESS NOTE ADULT - PROBLEM SELECTOR PLAN 2
FAMILY HISTORY:  No pertinent family history in first degree relatives     - Scr of 1.5 from baseline of 0.9  - likely secondary to poor PO intake  - will continue to encourage PO water intake.   - start on gentle LR   - Continue to monitor BMP

## 2024-01-01 NOTE — PROGRESS NOTE ADULT - PROBLEM/PLAN-9
DISPLAY PLAN FREE TEXT
DISPLAY PLAN FREE TEXT
Allergies:-  No Known Allergies      
DISPLAY PLAN FREE TEXT

## 2025-02-07 NOTE — CONSULT NOTE ADULT - CONSULT REQUESTED BY NAME
Mary Ann Winkler
Please contact patient to schedule office visit with Dr. Bond on 2/11 or 2/12.    Thank you.  
Dr. Kwok
Dr. Davis